# Patient Record
Sex: MALE | Race: BLACK OR AFRICAN AMERICAN | NOT HISPANIC OR LATINO | Employment: FULL TIME | URBAN - METROPOLITAN AREA
[De-identification: names, ages, dates, MRNs, and addresses within clinical notes are randomized per-mention and may not be internally consistent; named-entity substitution may affect disease eponyms.]

---

## 2017-02-02 ENCOUNTER — COMMUNICATION - HEALTHEAST (OUTPATIENT)
Dept: ADMINISTRATIVE | Facility: CLINIC | Age: 47
End: 2017-02-02

## 2017-02-07 ENCOUNTER — OFFICE VISIT - HEALTHEAST (OUTPATIENT)
Dept: FAMILY MEDICINE | Facility: CLINIC | Age: 47
End: 2017-02-07

## 2017-02-07 DIAGNOSIS — I71.20 ANEURYSM OF THORACIC AORTA (H): ICD-10-CM

## 2017-02-17 ENCOUNTER — HOSPITAL ENCOUNTER (OUTPATIENT)
Dept: CT IMAGING | Facility: CLINIC | Age: 47
Discharge: HOME | End: 2017-02-17
Attending: FAMILY MEDICINE

## 2017-02-17 ENCOUNTER — COMMUNICATION - HEALTHEAST (OUTPATIENT)
Dept: FAMILY MEDICINE | Facility: CLINIC | Age: 47
End: 2017-02-17

## 2017-02-17 DIAGNOSIS — I71.20 ANEURYSM OF THORACIC AORTA (H): ICD-10-CM

## 2017-02-24 ENCOUNTER — OFFICE VISIT - HEALTHEAST (OUTPATIENT)
Dept: FAMILY MEDICINE | Facility: CLINIC | Age: 47
End: 2017-02-24

## 2017-02-24 DIAGNOSIS — M10.9 GOUT, UNSPECIFIED: ICD-10-CM

## 2017-02-24 DIAGNOSIS — K76.0 FATTY LIVER: ICD-10-CM

## 2017-02-24 DIAGNOSIS — I71.20 ANEURYSM OF THORACIC AORTA (H): ICD-10-CM

## 2017-02-28 ENCOUNTER — OFFICE VISIT - HEALTHEAST (OUTPATIENT)
Dept: FAMILY MEDICINE | Facility: CLINIC | Age: 47
End: 2017-02-28

## 2017-02-28 ENCOUNTER — RECORDS - HEALTHEAST (OUTPATIENT)
Dept: GENERAL RADIOLOGY | Facility: CLINIC | Age: 47
End: 2017-02-28

## 2017-02-28 DIAGNOSIS — M10.9 GOUT, UNSPECIFIED: ICD-10-CM

## 2017-02-28 DIAGNOSIS — M79.671 FOOT PAIN, RIGHT: ICD-10-CM

## 2017-03-01 ENCOUNTER — COMMUNICATION - HEALTHEAST (OUTPATIENT)
Dept: FAMILY MEDICINE | Facility: CLINIC | Age: 47
End: 2017-03-01

## 2017-03-28 ENCOUNTER — OFFICE VISIT - HEALTHEAST (OUTPATIENT)
Dept: FAMILY MEDICINE | Facility: CLINIC | Age: 47
End: 2017-03-28

## 2017-03-28 DIAGNOSIS — M10.9 GOUT, UNSPECIFIED: ICD-10-CM

## 2017-04-05 ENCOUNTER — OFFICE VISIT - HEALTHEAST (OUTPATIENT)
Dept: FAMILY MEDICINE | Facility: CLINIC | Age: 47
End: 2017-04-05

## 2017-04-05 DIAGNOSIS — R11.0 NAUSEA: ICD-10-CM

## 2017-04-05 DIAGNOSIS — M54.50 LOW BACK PAIN: ICD-10-CM

## 2017-04-11 ENCOUNTER — OFFICE VISIT - HEALTHEAST (OUTPATIENT)
Dept: CARDIOLOGY | Facility: CLINIC | Age: 47
End: 2017-04-11

## 2017-04-11 DIAGNOSIS — I10 ESSENTIAL HYPERTENSION: ICD-10-CM

## 2017-04-11 DIAGNOSIS — I71.20 ANEURYSM OF THORACIC AORTA (H): ICD-10-CM

## 2017-04-11 ASSESSMENT — MIFFLIN-ST. JEOR: SCORE: 2019.73

## 2017-04-18 ENCOUNTER — COMMUNICATION - HEALTHEAST (OUTPATIENT)
Dept: FAMILY MEDICINE | Facility: CLINIC | Age: 47
End: 2017-04-18

## 2017-04-27 ENCOUNTER — COMMUNICATION - HEALTHEAST (OUTPATIENT)
Dept: FAMILY MEDICINE | Facility: CLINIC | Age: 47
End: 2017-04-27

## 2017-05-12 ENCOUNTER — OFFICE VISIT - HEALTHEAST (OUTPATIENT)
Dept: CARDIOLOGY | Facility: CLINIC | Age: 47
End: 2017-05-12

## 2017-05-12 DIAGNOSIS — I71.20 THORACIC AORTIC ANEURYSM WITHOUT RUPTURE (H): ICD-10-CM

## 2017-05-12 ASSESSMENT — MIFFLIN-ST. JEOR: SCORE: 2028.8

## 2017-07-22 ENCOUNTER — COMMUNICATION - HEALTHEAST (OUTPATIENT)
Dept: FAMILY MEDICINE | Facility: CLINIC | Age: 47
End: 2017-07-22

## 2017-07-22 DIAGNOSIS — I10 HYPERTENSION: ICD-10-CM

## 2017-08-15 ENCOUNTER — OFFICE VISIT - HEALTHEAST (OUTPATIENT)
Dept: FAMILY MEDICINE | Facility: CLINIC | Age: 47
End: 2017-08-15

## 2017-08-15 DIAGNOSIS — M10.9 GOUT, UNSPECIFIED: ICD-10-CM

## 2017-08-15 DIAGNOSIS — I51.7 LVH (LEFT VENTRICULAR HYPERTROPHY): ICD-10-CM

## 2017-08-15 DIAGNOSIS — I71.20 THORACIC AORTIC ANEURYSM WITHOUT RUPTURE (H): ICD-10-CM

## 2017-08-15 DIAGNOSIS — I71.20 ANEURYSM OF THORACIC AORTA (H): ICD-10-CM

## 2017-08-15 DIAGNOSIS — I10 HYPERTENSION: ICD-10-CM

## 2017-08-15 DIAGNOSIS — E66.9 OBESITY, UNSPECIFIED: ICD-10-CM

## 2017-08-30 ENCOUNTER — OFFICE VISIT - HEALTHEAST (OUTPATIENT)
Dept: VASCULAR SURGERY | Facility: CLINIC | Age: 47
End: 2017-08-30

## 2017-08-30 DIAGNOSIS — I71.21 THORACIC ASCENDING AORTIC ANEURYSM (H): ICD-10-CM

## 2017-08-30 ASSESSMENT — MIFFLIN-ST. JEOR: SCORE: 2033.34

## 2017-09-11 ENCOUNTER — HOSPITAL ENCOUNTER (OUTPATIENT)
Dept: CARDIOLOGY | Facility: CLINIC | Age: 47
Discharge: HOME | End: 2017-09-11
Attending: SURGERY

## 2017-09-11 DIAGNOSIS — I71.21 THORACIC ASCENDING AORTIC ANEURYSM (H): ICD-10-CM

## 2017-09-11 LAB
AORTIC ROOT: 4.3 CM
AORTIC VALVE MEAN VELOCITY: 91.3 CM/S
AV DIMENSIONLESS INDEX VTI: 0.7
AV MEAN GRADIENT: 4 MMHG
AV PEAK GRADIENT: 7.1 MMHG
AV VELOCITY RATIO: 0.8
BSA FOR ECHO PROCEDURE: 2.41 M2
CV BLOOD PRESSURE: NORMAL MMHG
CV ECHO HEIGHT: 69 IN
CV ECHO WEIGHT: 263 LBS
DOP CALC AO PEAK VEL: 133 CM/S
DOP CALC AO VTI: 28.4 CM
DOP CALC LVOT PEAK VEL: 102 CM/S
DOP CALCLVOT PEAK VEL VTI: 20.8 CM
EJECTION FRACTION: 53 % (ref 55–75)
FRACTIONAL SHORTENING: 37.7 % (ref 28–44)
INTERVENTRICULAR SEPTUM IN END DIASTOLE: 1.21 CM (ref 0.6–1)
IVS/PW RATIO: 1
LA AREA 1: 19.2 CM2
LA AREA 2: 18.7 CM2
LEFT ATRIUM LENGTH: 4.82 CM
LEFT ATRIUM VOLUME INDEX: 26.3 ML/M2
LEFT ATRIUM VOLUME: 63.3 CM3
LEFT VENTRICLE DIASTOLIC VOLUME INDEX: 34.4 CM3/M2 (ref 34–74)
LEFT VENTRICLE DIASTOLIC VOLUME: 83 CM3 (ref 62–150)
LEFT VENTRICLE HEART RATE: 64 BPM
LEFT VENTRICLE MASS INDEX: 96.5 G/M2
LEFT VENTRICLE SYSTOLIC VOLUME INDEX: 16.2 CM3/M2 (ref 11–31)
LEFT VENTRICLE SYSTOLIC VOLUME: 39 CM3 (ref 21–61)
LEFT VENTRICULAR INTERNAL DIMENSION IN DIASTOLE: 5.04 CM (ref 4.2–5.8)
LEFT VENTRICULAR INTERNAL DIMENSION IN SYSTOLE: 3.14 CM (ref 2.5–4)
LEFT VENTRICULAR MASS: 232.6 G
LEFT VENTRICULAR OUTFLOW TRACT MEAN GRADIENT: 2 MMHG
LEFT VENTRICULAR OUTFLOW TRACT MEAN VELOCITY: 65.9 CM/S
LEFT VENTRICULAR OUTFLOW TRACT PEAK GRADIENT: 4 MMHG
LEFT VENTRICULAR POSTERIOR WALL IN END DIASTOLE: 1.16 CM (ref 0.6–1)
MITRAL VALVE E/A RATIO: 0.8
MV AVERAGE E/E' RATIO: 12.1 CM/S
MV DECELERATION TIME: 183 MS
MV E'TISSUE VEL-LAT: 5.46 CM/S
MV E'TISSUE VEL-MED: 3.61 CM/S
MV LATERAL E/E' RATIO: 10.1
MV MEDIAL E/E' RATIO: 15.2
MV PEAK A VELOCITY: 67.3 CM/S
MV PEAK E VELOCITY: 54.9 CM/S
NUC REST DIASTOLIC VOLUME INDEX: 4208 LBS
NUC REST SYSTOLIC VOLUME INDEX: 69 IN
PR MAX PG: 5 MMHG
PR PEAK VELOCITY: 110 CM/S
TRICUSPID REGURGITATION PEAK PRESSURE GRADIENT: 20.3 MMHG
TRICUSPID VALVE PEAK REGURGITANT VELOCITY: 225 CM/S

## 2017-09-11 ASSESSMENT — MIFFLIN-ST. JEOR: SCORE: 2033.34

## 2017-10-25 ENCOUNTER — COMMUNICATION - HEALTHEAST (OUTPATIENT)
Dept: FAMILY MEDICINE | Facility: CLINIC | Age: 47
End: 2017-10-25

## 2017-10-25 DIAGNOSIS — M10.9 GOUT: ICD-10-CM

## 2017-11-03 ENCOUNTER — OFFICE VISIT - HEALTHEAST (OUTPATIENT)
Dept: FAMILY MEDICINE | Facility: CLINIC | Age: 47
End: 2017-11-03

## 2017-11-03 ENCOUNTER — COMMUNICATION - HEALTHEAST (OUTPATIENT)
Dept: FAMILY MEDICINE | Facility: CLINIC | Age: 47
End: 2017-11-03

## 2017-11-03 DIAGNOSIS — I10 HYPERTENSION: ICD-10-CM

## 2017-11-03 DIAGNOSIS — I51.7 LVH (LEFT VENTRICULAR HYPERTROPHY): ICD-10-CM

## 2017-11-03 DIAGNOSIS — I71.20 THORACIC AORTIC ANEURYSM WITHOUT RUPTURE (H): ICD-10-CM

## 2017-11-03 DIAGNOSIS — R68.89 GENERAL ILL FEELING: ICD-10-CM

## 2017-11-03 LAB
CHOLEST SERPL-MCNC: 191 MG/DL
FASTING STATUS PATIENT QL REPORTED: YES
HDLC SERPL-MCNC: 35 MG/DL
LDLC SERPL CALC-MCNC: 112 MG/DL
TRIGL SERPL-MCNC: 221 MG/DL

## 2017-11-27 ENCOUNTER — COMMUNICATION - HEALTHEAST (OUTPATIENT)
Dept: ADMINISTRATIVE | Facility: CLINIC | Age: 47
End: 2017-11-27

## 2017-12-08 ENCOUNTER — OFFICE VISIT - HEALTHEAST (OUTPATIENT)
Dept: FAMILY MEDICINE | Facility: CLINIC | Age: 47
End: 2017-12-08

## 2017-12-08 DIAGNOSIS — I71.20 THORACIC AORTIC ANEURYSM WITHOUT RUPTURE (H): ICD-10-CM

## 2018-01-02 ENCOUNTER — OFFICE VISIT - HEALTHEAST (OUTPATIENT)
Dept: FAMILY MEDICINE | Facility: CLINIC | Age: 48
End: 2018-01-02

## 2018-01-02 DIAGNOSIS — I71.20 THORACIC AORTIC ANEURYSM WITHOUT RUPTURE (H): ICD-10-CM

## 2018-03-13 ENCOUNTER — OFFICE VISIT - HEALTHEAST (OUTPATIENT)
Dept: FAMILY MEDICINE | Facility: CLINIC | Age: 48
End: 2018-03-13

## 2018-03-13 DIAGNOSIS — B34.9 VIRAL ILLNESS: ICD-10-CM

## 2018-03-13 DIAGNOSIS — I10 HYPERTENSION: ICD-10-CM

## 2018-03-21 ENCOUNTER — COMMUNICATION - HEALTHEAST (OUTPATIENT)
Dept: FAMILY MEDICINE | Facility: CLINIC | Age: 48
End: 2018-03-21

## 2018-03-26 ENCOUNTER — OFFICE VISIT - HEALTHEAST (OUTPATIENT)
Dept: FAMILY MEDICINE | Facility: CLINIC | Age: 48
End: 2018-03-26

## 2018-03-26 DIAGNOSIS — I71.20 THORACIC AORTIC ANEURYSM WITHOUT RUPTURE (H): ICD-10-CM

## 2018-03-26 DIAGNOSIS — I10 HYPERTENSION: ICD-10-CM

## 2018-05-11 ENCOUNTER — OFFICE VISIT - HEALTHEAST (OUTPATIENT)
Dept: FAMILY MEDICINE | Facility: CLINIC | Age: 48
End: 2018-05-11

## 2018-05-11 ENCOUNTER — COMMUNICATION - HEALTHEAST (OUTPATIENT)
Dept: FAMILY MEDICINE | Facility: CLINIC | Age: 48
End: 2018-05-11

## 2018-05-11 ENCOUNTER — HOSPITAL ENCOUNTER (OUTPATIENT)
Dept: CT IMAGING | Facility: CLINIC | Age: 48
Discharge: HOME | End: 2018-05-11
Attending: FAMILY MEDICINE

## 2018-05-11 ENCOUNTER — RECORDS - HEALTHEAST (OUTPATIENT)
Dept: GENERAL RADIOLOGY | Facility: CLINIC | Age: 48
End: 2018-05-11

## 2018-05-11 DIAGNOSIS — R05.9 COUGH: ICD-10-CM

## 2018-05-11 DIAGNOSIS — I71.20 THORACIC AORTIC ANEURYSM WITHOUT RUPTURE (H): ICD-10-CM

## 2018-05-11 DIAGNOSIS — I10 HYPERTENSION: ICD-10-CM

## 2018-05-14 ENCOUNTER — COMMUNICATION - HEALTHEAST (OUTPATIENT)
Dept: FAMILY MEDICINE | Facility: CLINIC | Age: 48
End: 2018-05-14

## 2018-05-15 ENCOUNTER — AMBULATORY - HEALTHEAST (OUTPATIENT)
Dept: FAMILY MEDICINE | Facility: CLINIC | Age: 48
End: 2018-05-15

## 2018-05-16 ENCOUNTER — COMMUNICATION - HEALTHEAST (OUTPATIENT)
Dept: FAMILY MEDICINE | Facility: CLINIC | Age: 48
End: 2018-05-16

## 2018-05-21 ENCOUNTER — OFFICE VISIT - HEALTHEAST (OUTPATIENT)
Dept: FAMILY MEDICINE | Facility: CLINIC | Age: 48
End: 2018-05-21

## 2018-05-21 DIAGNOSIS — R05.9 COUGH: ICD-10-CM

## 2018-05-30 ENCOUNTER — OFFICE VISIT - HEALTHEAST (OUTPATIENT)
Dept: FAMILY MEDICINE | Facility: CLINIC | Age: 48
End: 2018-05-30

## 2018-05-30 DIAGNOSIS — I10 HYPERTENSION: ICD-10-CM

## 2018-06-13 ENCOUNTER — OFFICE VISIT - HEALTHEAST (OUTPATIENT)
Dept: FAMILY MEDICINE | Facility: CLINIC | Age: 48
End: 2018-06-13

## 2018-06-13 DIAGNOSIS — I71.20 THORACIC AORTIC ANEURYSM WITHOUT RUPTURE (H): ICD-10-CM

## 2018-06-13 DIAGNOSIS — I10 HYPERTENSION: ICD-10-CM

## 2018-08-20 ENCOUNTER — OFFICE VISIT - HEALTHEAST (OUTPATIENT)
Dept: FAMILY MEDICINE | Facility: CLINIC | Age: 48
End: 2018-08-20

## 2018-08-20 DIAGNOSIS — E66.01 MORBID OBESITY (H): ICD-10-CM

## 2018-08-20 DIAGNOSIS — I10 HYPERTENSION: ICD-10-CM

## 2018-08-20 DIAGNOSIS — M10.9 GOUT: ICD-10-CM

## 2018-09-02 ENCOUNTER — COMMUNICATION - HEALTHEAST (OUTPATIENT)
Dept: FAMILY MEDICINE | Facility: CLINIC | Age: 48
End: 2018-09-02

## 2018-09-05 ENCOUNTER — OFFICE VISIT - HEALTHEAST (OUTPATIENT)
Dept: FAMILY MEDICINE | Facility: CLINIC | Age: 48
End: 2018-09-05

## 2018-09-05 DIAGNOSIS — M10.9 GOUT: ICD-10-CM

## 2018-09-05 DIAGNOSIS — I10 HYPERTENSION: ICD-10-CM

## 2018-09-05 DIAGNOSIS — I51.7 LVH (LEFT VENTRICULAR HYPERTROPHY): ICD-10-CM

## 2018-09-12 ENCOUNTER — OFFICE VISIT - HEALTHEAST (OUTPATIENT)
Dept: FAMILY MEDICINE | Facility: CLINIC | Age: 48
End: 2018-09-12

## 2018-09-12 DIAGNOSIS — I10 HYPERTENSION: ICD-10-CM

## 2018-09-12 LAB
ANION GAP SERPL CALCULATED.3IONS-SCNC: 13 MMOL/L (ref 5–18)
BUN SERPL-MCNC: 13 MG/DL (ref 8–22)
CALCIUM SERPL-MCNC: 10.1 MG/DL (ref 8.5–10.5)
CHLORIDE BLD-SCNC: 103 MMOL/L (ref 98–107)
CO2 SERPL-SCNC: 25 MMOL/L (ref 22–31)
CREAT SERPL-MCNC: 1.16 MG/DL (ref 0.7–1.3)
GFR SERPL CREATININE-BSD FRML MDRD: >60 ML/MIN/1.73M2
GLUCOSE BLD-MCNC: 130 MG/DL (ref 70–125)
POTASSIUM BLD-SCNC: 3.8 MMOL/L (ref 3.5–5)
SODIUM SERPL-SCNC: 141 MMOL/L (ref 136–145)

## 2018-09-14 ENCOUNTER — COMMUNICATION - HEALTHEAST (OUTPATIENT)
Dept: FAMILY MEDICINE | Facility: CLINIC | Age: 48
End: 2018-09-14

## 2018-09-19 ENCOUNTER — OFFICE VISIT - HEALTHEAST (OUTPATIENT)
Dept: FAMILY MEDICINE | Facility: CLINIC | Age: 48
End: 2018-09-19

## 2018-09-19 DIAGNOSIS — I10 HYPERTENSION: ICD-10-CM

## 2018-09-21 ENCOUNTER — COMMUNICATION - HEALTHEAST (OUTPATIENT)
Dept: FAMILY MEDICINE | Facility: CLINIC | Age: 48
End: 2018-09-21

## 2018-09-24 ENCOUNTER — COMMUNICATION - HEALTHEAST (OUTPATIENT)
Dept: FAMILY MEDICINE | Facility: CLINIC | Age: 48
End: 2018-09-24

## 2018-09-24 ENCOUNTER — AMBULATORY - HEALTHEAST (OUTPATIENT)
Dept: FAMILY MEDICINE | Facility: CLINIC | Age: 48
End: 2018-09-24

## 2018-09-26 ENCOUNTER — COMMUNICATION - HEALTHEAST (OUTPATIENT)
Dept: FAMILY MEDICINE | Facility: CLINIC | Age: 48
End: 2018-09-26

## 2018-10-05 ENCOUNTER — OFFICE VISIT - HEALTHEAST (OUTPATIENT)
Dept: FAMILY MEDICINE | Facility: CLINIC | Age: 48
End: 2018-10-05

## 2018-10-05 DIAGNOSIS — I10 HYPERTENSION: ICD-10-CM

## 2018-10-22 ENCOUNTER — COMMUNICATION - HEALTHEAST (OUTPATIENT)
Dept: ADMINISTRATIVE | Facility: CLINIC | Age: 48
End: 2018-10-22

## 2018-10-23 ENCOUNTER — OFFICE VISIT - HEALTHEAST (OUTPATIENT)
Dept: FAMILY MEDICINE | Facility: CLINIC | Age: 48
End: 2018-10-23

## 2018-10-23 DIAGNOSIS — I10 HYPERTENSION: ICD-10-CM

## 2018-10-23 DIAGNOSIS — M10.9 GOUT: ICD-10-CM

## 2018-12-04 ENCOUNTER — COMMUNICATION - HEALTHEAST (OUTPATIENT)
Dept: FAMILY MEDICINE | Facility: CLINIC | Age: 48
End: 2018-12-04

## 2018-12-04 DIAGNOSIS — I10 HYPERTENSION: ICD-10-CM

## 2018-12-22 ENCOUNTER — COMMUNICATION - HEALTHEAST (OUTPATIENT)
Dept: FAMILY MEDICINE | Facility: CLINIC | Age: 48
End: 2018-12-22

## 2018-12-22 DIAGNOSIS — I10 HYPERTENSION: ICD-10-CM

## 2018-12-22 DIAGNOSIS — I51.7 LVH (LEFT VENTRICULAR HYPERTROPHY): ICD-10-CM

## 2019-01-28 ENCOUNTER — OFFICE VISIT - HEALTHEAST (OUTPATIENT)
Dept: FAMILY MEDICINE | Facility: CLINIC | Age: 49
End: 2019-01-28

## 2019-01-28 DIAGNOSIS — B34.9 VIRAL ILLNESS: ICD-10-CM

## 2019-01-28 DIAGNOSIS — I10 HYPERTENSION, UNSPECIFIED TYPE: ICD-10-CM

## 2019-02-11 ENCOUNTER — OFFICE VISIT - HEALTHEAST (OUTPATIENT)
Dept: FAMILY MEDICINE | Facility: CLINIC | Age: 49
End: 2019-02-11

## 2019-02-11 DIAGNOSIS — I10 HYPERTENSION, UNSPECIFIED TYPE: ICD-10-CM

## 2019-02-27 ENCOUNTER — OFFICE VISIT - HEALTHEAST (OUTPATIENT)
Dept: FAMILY MEDICINE | Facility: CLINIC | Age: 49
End: 2019-02-27

## 2019-02-27 DIAGNOSIS — R07.9 CHEST PAIN, UNSPECIFIED TYPE: ICD-10-CM

## 2019-02-27 DIAGNOSIS — I10 HYPERTENSION, UNSPECIFIED TYPE: ICD-10-CM

## 2019-02-27 DIAGNOSIS — R51.9 NONINTRACTABLE HEADACHE, UNSPECIFIED CHRONICITY PATTERN, UNSPECIFIED HEADACHE TYPE: ICD-10-CM

## 2019-03-04 ENCOUNTER — AMBULATORY - HEALTHEAST (OUTPATIENT)
Dept: FAMILY MEDICINE | Facility: CLINIC | Age: 49
End: 2019-03-04

## 2019-03-04 ENCOUNTER — COMMUNICATION - HEALTHEAST (OUTPATIENT)
Dept: FAMILY MEDICINE | Facility: CLINIC | Age: 49
End: 2019-03-04

## 2019-03-19 ENCOUNTER — OFFICE VISIT - HEALTHEAST (OUTPATIENT)
Dept: FAMILY MEDICINE | Facility: CLINIC | Age: 49
End: 2019-03-19

## 2019-03-19 DIAGNOSIS — R51.9 NONINTRACTABLE HEADACHE, UNSPECIFIED CHRONICITY PATTERN, UNSPECIFIED HEADACHE TYPE: ICD-10-CM

## 2019-03-19 DIAGNOSIS — H53.143 PHOTOPHOBIA OF BOTH EYES: ICD-10-CM

## 2019-03-19 DIAGNOSIS — I10 HYPERTENSION, UNSPECIFIED TYPE: ICD-10-CM

## 2019-03-19 DIAGNOSIS — I71.20 THORACIC AORTIC ANEURYSM WITHOUT RUPTURE (H): ICD-10-CM

## 2019-03-19 LAB
ANION GAP SERPL CALCULATED.3IONS-SCNC: 9 MMOL/L (ref 5–18)
BUN SERPL-MCNC: 12 MG/DL (ref 8–22)
C REACTIVE PROTEIN LHE: 2.1 MG/DL (ref 0–0.8)
CALCIUM SERPL-MCNC: 9.9 MG/DL (ref 8.5–10.5)
CHLORIDE BLD-SCNC: 103 MMOL/L (ref 98–107)
CO2 SERPL-SCNC: 28 MMOL/L (ref 22–31)
CREAT SERPL-MCNC: 1.24 MG/DL (ref 0.7–1.3)
ERYTHROCYTE [DISTWIDTH] IN BLOOD BY AUTOMATED COUNT: 14.1 % (ref 11–14.5)
ERYTHROCYTE [SEDIMENTATION RATE] IN BLOOD BY WESTERGREN METHOD: 13 MM/HR (ref 0–15)
GFR SERPL CREATININE-BSD FRML MDRD: >60 ML/MIN/1.73M2
GLUCOSE BLD-MCNC: 134 MG/DL (ref 70–125)
HCT VFR BLD AUTO: 45 % (ref 40–54)
HGB BLD-MCNC: 15 G/DL (ref 14–18)
MCH RBC QN AUTO: 29.1 PG (ref 27–34)
MCHC RBC AUTO-ENTMCNC: 33.4 G/DL (ref 32–36)
MCV RBC AUTO: 87 FL (ref 80–100)
PLATELET # BLD AUTO: 256 THOU/UL (ref 140–440)
PMV BLD AUTO: 6.5 FL (ref 7–10)
POTASSIUM BLD-SCNC: 4 MMOL/L (ref 3.5–5)
RBC # BLD AUTO: 5.16 MILL/UL (ref 4.4–6.2)
SODIUM SERPL-SCNC: 140 MMOL/L (ref 136–145)
WBC: 5.7 THOU/UL (ref 4–11)

## 2019-03-20 ENCOUNTER — AMBULATORY - HEALTHEAST (OUTPATIENT)
Dept: FAMILY MEDICINE | Facility: CLINIC | Age: 49
End: 2019-03-20

## 2019-03-20 ENCOUNTER — COMMUNICATION - HEALTHEAST (OUTPATIENT)
Dept: FAMILY MEDICINE | Facility: CLINIC | Age: 49
End: 2019-03-20

## 2019-03-20 ENCOUNTER — HOSPITAL ENCOUNTER (OUTPATIENT)
Dept: CT IMAGING | Facility: CLINIC | Age: 49
Discharge: HOME | End: 2019-03-20
Attending: FAMILY MEDICINE

## 2019-03-20 DIAGNOSIS — I71.20 THORACIC AORTIC ANEURYSM WITHOUT RUPTURE (H): ICD-10-CM

## 2019-03-20 DIAGNOSIS — R51.9 NONINTRACTABLE HEADACHE, UNSPECIFIED CHRONICITY PATTERN, UNSPECIFIED HEADACHE TYPE: ICD-10-CM

## 2019-03-27 ENCOUNTER — COMMUNICATION - HEALTHEAST (OUTPATIENT)
Dept: FAMILY MEDICINE | Facility: CLINIC | Age: 49
End: 2019-03-27

## 2019-03-29 ENCOUNTER — COMMUNICATION - HEALTHEAST (OUTPATIENT)
Dept: FAMILY MEDICINE | Facility: CLINIC | Age: 49
End: 2019-03-29

## 2019-03-29 ENCOUNTER — OFFICE VISIT - HEALTHEAST (OUTPATIENT)
Dept: FAMILY MEDICINE | Facility: CLINIC | Age: 49
End: 2019-03-29

## 2019-03-29 DIAGNOSIS — Z01.818 PREOP EXAMINATION: ICD-10-CM

## 2019-03-29 DIAGNOSIS — I10 HYPERTENSION, UNSPECIFIED TYPE: ICD-10-CM

## 2019-03-29 DIAGNOSIS — I71.20 THORACIC AORTIC ANEURYSM WITHOUT RUPTURE (H): ICD-10-CM

## 2019-03-29 DIAGNOSIS — R51.9 NONINTRACTABLE HEADACHE, UNSPECIFIED CHRONICITY PATTERN, UNSPECIFIED HEADACHE TYPE: ICD-10-CM

## 2019-03-29 LAB
ANION GAP SERPL CALCULATED.3IONS-SCNC: 11 MMOL/L (ref 5–18)
ATRIAL RATE - MUSE: 73 BPM
BUN SERPL-MCNC: 9 MG/DL (ref 8–22)
CALCIUM SERPL-MCNC: 9.8 MG/DL (ref 8.5–10.5)
CHLORIDE BLD-SCNC: 104 MMOL/L (ref 98–107)
CO2 SERPL-SCNC: 25 MMOL/L (ref 22–31)
CREAT SERPL-MCNC: 1.11 MG/DL (ref 0.7–1.3)
DIASTOLIC BLOOD PRESSURE - MUSE: NORMAL MMHG
ERYTHROCYTE [DISTWIDTH] IN BLOOD BY AUTOMATED COUNT: 13.3 % (ref 11–14.5)
GFR SERPL CREATININE-BSD FRML MDRD: >60 ML/MIN/1.73M2
GLUCOSE BLD-MCNC: 148 MG/DL (ref 70–125)
HCT VFR BLD AUTO: 47.9 % (ref 40–54)
HGB BLD-MCNC: 16.1 G/DL (ref 14–18)
INTERPRETATION ECG - MUSE: NORMAL
MCH RBC QN AUTO: 30.1 PG (ref 27–34)
MCHC RBC AUTO-ENTMCNC: 33.6 G/DL (ref 32–36)
MCV RBC AUTO: 90 FL (ref 80–100)
P AXIS - MUSE: 46 DEGREES
PLATELET # BLD AUTO: 271 THOU/UL (ref 140–440)
PMV BLD AUTO: 6.7 FL (ref 7–10)
POTASSIUM BLD-SCNC: 4 MMOL/L (ref 3.5–5)
PR INTERVAL - MUSE: 166 MS
QRS DURATION - MUSE: 90 MS
QT - MUSE: 390 MS
QTC - MUSE: 429 MS
R AXIS - MUSE: -23 DEGREES
RBC # BLD AUTO: 5.35 MILL/UL (ref 4.4–6.2)
SODIUM SERPL-SCNC: 140 MMOL/L (ref 136–145)
SYSTOLIC BLOOD PRESSURE - MUSE: NORMAL MMHG
T AXIS - MUSE: 32 DEGREES
VENTRICULAR RATE- MUSE: 73 BPM
WBC: 4.9 THOU/UL (ref 4–11)

## 2019-03-31 ENCOUNTER — COMMUNICATION - HEALTHEAST (OUTPATIENT)
Dept: FAMILY MEDICINE | Facility: CLINIC | Age: 49
End: 2019-03-31

## 2019-04-03 ENCOUNTER — OFFICE VISIT - HEALTHEAST (OUTPATIENT)
Dept: CARDIOLOGY | Facility: CLINIC | Age: 49
End: 2019-04-03

## 2019-04-03 DIAGNOSIS — I71.20 THORACIC AORTIC ANEURYSM WITHOUT RUPTURE (H): ICD-10-CM

## 2019-04-03 ASSESSMENT — MIFFLIN-ST. JEOR: SCORE: 2060.56

## 2019-04-19 ENCOUNTER — RECORDS - HEALTHEAST (OUTPATIENT)
Dept: ADMINISTRATIVE | Facility: OTHER | Age: 49
End: 2019-04-19

## 2019-04-24 ENCOUNTER — COMMUNICATION - HEALTHEAST (OUTPATIENT)
Dept: FAMILY MEDICINE | Facility: CLINIC | Age: 49
End: 2019-04-24

## 2019-04-30 ENCOUNTER — AMBULATORY - HEALTHEAST (OUTPATIENT)
Dept: CARDIOLOGY | Facility: CLINIC | Age: 49
End: 2019-04-30

## 2019-04-30 DIAGNOSIS — I71.21 THORACIC ASCENDING AORTIC ANEURYSM (H): ICD-10-CM

## 2019-05-01 ENCOUNTER — COMMUNICATION - HEALTHEAST (OUTPATIENT)
Dept: CARDIOLOGY | Facility: CLINIC | Age: 49
End: 2019-05-01

## 2019-05-01 ENCOUNTER — SURGERY - HEALTHEAST (OUTPATIENT)
Dept: CARDIOLOGY | Facility: CLINIC | Age: 49
End: 2019-05-01

## 2019-05-02 ENCOUNTER — COMMUNICATION - HEALTHEAST (OUTPATIENT)
Dept: FAMILY MEDICINE | Facility: CLINIC | Age: 49
End: 2019-05-02

## 2019-05-02 ENCOUNTER — SURGERY - HEALTHEAST (OUTPATIENT)
Dept: CARDIOLOGY | Facility: CLINIC | Age: 49
End: 2019-05-02

## 2019-05-02 ASSESSMENT — MIFFLIN-ST. JEOR: SCORE: 2050.12

## 2019-05-07 ENCOUNTER — COMMUNICATION - HEALTHEAST (OUTPATIENT)
Dept: CARDIOLOGY | Facility: CLINIC | Age: 49
End: 2019-05-07

## 2019-05-17 ENCOUNTER — OFFICE VISIT - HEALTHEAST (OUTPATIENT)
Dept: CARDIOLOGY | Facility: CLINIC | Age: 49
End: 2019-05-17

## 2019-05-17 DIAGNOSIS — I71.21 THORACIC ASCENDING AORTIC ANEURYSM (H): ICD-10-CM

## 2019-05-17 ASSESSMENT — MIFFLIN-ST. JEOR: SCORE: 2052.39

## 2019-05-21 ENCOUNTER — AMBULATORY - HEALTHEAST (OUTPATIENT)
Dept: CARDIOLOGY | Facility: CLINIC | Age: 49
End: 2019-05-21

## 2019-05-22 ENCOUNTER — COMMUNICATION - HEALTHEAST (OUTPATIENT)
Dept: ADMINISTRATIVE | Facility: CLINIC | Age: 49
End: 2019-05-22

## 2019-06-04 ENCOUNTER — OFFICE VISIT - HEALTHEAST (OUTPATIENT)
Dept: FAMILY MEDICINE | Facility: CLINIC | Age: 49
End: 2019-06-04

## 2019-06-04 DIAGNOSIS — Z12.11 SCREEN FOR COLON CANCER: ICD-10-CM

## 2019-06-04 DIAGNOSIS — I71.20 THORACIC AORTIC ANEURYSM WITHOUT RUPTURE (H): ICD-10-CM

## 2019-06-04 DIAGNOSIS — I10 HYPERTENSION, UNSPECIFIED TYPE: ICD-10-CM

## 2019-06-04 DIAGNOSIS — G43.909 MIGRAINE WITHOUT STATUS MIGRAINOSUS, NOT INTRACTABLE, UNSPECIFIED MIGRAINE TYPE: ICD-10-CM

## 2019-06-05 ENCOUNTER — HOSPITAL ENCOUNTER (OUTPATIENT)
Dept: CARDIOLOGY | Facility: CLINIC | Age: 49
Discharge: HOME | End: 2019-06-05

## 2019-06-05 DIAGNOSIS — I71.21 THORACIC ASCENDING AORTIC ANEURYSM (H): ICD-10-CM

## 2019-06-05 ASSESSMENT — MIFFLIN-ST. JEOR: SCORE: 2042.41

## 2019-06-06 ENCOUNTER — SURGERY - HEALTHEAST (OUTPATIENT)
Dept: CARDIOLOGY | Facility: CLINIC | Age: 49
End: 2019-06-06

## 2019-06-06 ENCOUNTER — HOSPITAL ENCOUNTER (OUTPATIENT)
Dept: RADIOLOGY | Facility: CLINIC | Age: 49
Discharge: HOME | End: 2019-06-06

## 2019-06-06 DIAGNOSIS — I71.21 THORACIC ASCENDING AORTIC ANEURYSM (H): ICD-10-CM

## 2019-06-06 LAB
AORTIC ROOT: 4 CM
AORTIC VALVE MEAN VELOCITY: 89.2 CM/S
ASCENDING AORTA: 5.4 CM
AV DIMENSIONLESS INDEX VTI: 0.8
AV MEAN GRADIENT: 4 MMHG
AV PEAK GRADIENT: 7.7 MMHG
AV VALVE AREA: 2.8 CM2
AV VELOCITY RATIO: 0.7
BSA FOR ECHO PROCEDURE: 2.42 M2
CV BLOOD PRESSURE: ABNORMAL MMHG
CV ECHO HEIGHT: 69 IN
CV ECHO WEIGHT: 265 LBS
DOP CALC AO PEAK VEL: 139 CM/S
DOP CALC AO VTI: 23.5 CM
DOP CALC LVOT AREA: 3.46 CM2
DOP CALC LVOT DIAMETER: 2.1 CM
DOP CALC LVOT PEAK VEL: 104 CM/S
DOP CALC LVOT STROKE VOLUME: 65.1 CM3
DOP CALCLVOT PEAK VEL VTI: 18.8 CM
EJECTION FRACTION: 55 % (ref 55–75)
FRACTIONAL SHORTENING: 30.5 % (ref 28–44)
INTERVENTRICULAR SEPTUM IN END DIASTOLE: 1.17 CM (ref 0.6–1)
IVS/PW RATIO: 1.2
LA AREA 1: 16 CM2
LA AREA 2: 17.7 CM2
LEFT ATRIUM LENGTH: 4.83 CM
LEFT ATRIUM SIZE: 3 CM
LEFT ATRIUM TO AORTIC ROOT RATIO: 0.75 NO UNITS
LEFT ATRIUM VOLUME INDEX: 20.6 ML/M2
LEFT ATRIUM VOLUME: 49.8 ML
LEFT VENTRICLE DIASTOLIC VOLUME INDEX: 34.7 CM3/M2 (ref 34–74)
LEFT VENTRICLE DIASTOLIC VOLUME: 84 CM3 (ref 62–150)
LEFT VENTRICLE MASS INDEX: 63.1 G/M2
LEFT VENTRICLE SYSTOLIC VOLUME INDEX: 15.7 CM3/M2 (ref 11–31)
LEFT VENTRICLE SYSTOLIC VOLUME: 38 CM3 (ref 21–61)
LEFT VENTRICULAR INTERNAL DIMENSION IN DIASTOLE: 4.2 CM (ref 4.2–5.8)
LEFT VENTRICULAR INTERNAL DIMENSION IN SYSTOLE: 2.92 CM (ref 2.5–4)
LEFT VENTRICULAR MASS: 152.6 G
LEFT VENTRICULAR OUTFLOW TRACT MEAN GRADIENT: 3 MMHG
LEFT VENTRICULAR OUTFLOW TRACT MEAN VELOCITY: 77.5 CM/S
LEFT VENTRICULAR OUTFLOW TRACT PEAK GRADIENT: 4 MMHG
LEFT VENTRICULAR POSTERIOR WALL IN END DIASTOLE: 0.99 CM (ref 0.6–1)
LV STROKE VOLUME INDEX: 26.9 ML/M2
MITRAL VALVE E/A RATIO: 0.6
MV AVERAGE E/E' RATIO: 10.7 CM/S
MV DECELERATION TIME: 257 MS
MV E'TISSUE VEL-LAT: 5.56 CM/S
MV E'TISSUE VEL-MED: 2.92 CM/S
MV LATERAL E/E' RATIO: 8.2
MV MEDIAL E/E' RATIO: 15.5
MV PEAK A VELOCITY: 74.5 CM/S
MV PEAK E VELOCITY: 45.4 CM/S
NUC REST DIASTOLIC VOLUME INDEX: 4240 LBS
NUC REST SYSTOLIC VOLUME INDEX: 69 IN
TRICUSPID VALVE ANULAR PLANE SYSTOLIC EXCURSION: 1.9 CM

## 2019-06-07 ENCOUNTER — AMBULATORY - HEALTHEAST (OUTPATIENT)
Dept: CARDIOLOGY | Facility: CLINIC | Age: 49
End: 2019-06-07

## 2019-06-07 ENCOUNTER — HOSPITAL ENCOUNTER (OUTPATIENT)
Dept: SURGERY | Facility: CLINIC | Age: 49
Discharge: HOME | End: 2019-06-07

## 2019-06-07 ENCOUNTER — ANESTHESIA - HEALTHEAST (OUTPATIENT)
Dept: SURGERY | Facility: CLINIC | Age: 49
End: 2019-06-07

## 2019-06-07 DIAGNOSIS — I71.21 THORACIC ASCENDING AORTIC ANEURYSM (H): ICD-10-CM

## 2019-06-07 DIAGNOSIS — I71.20 THORACIC AORTIC ANEURYSM WITHOUT RUPTURE (H): ICD-10-CM

## 2019-06-07 LAB
ABO/RH(D): NORMAL
ALBUMIN UR-MCNC: NEGATIVE MG/DL
ANION GAP SERPL CALCULATED.3IONS-SCNC: 10 MMOL/L (ref 5–18)
ANTIBODY SCREEN: NEGATIVE
APPEARANCE UR: CLEAR
ATRIAL RATE - MUSE: 87 BPM
BILIRUB UR QL STRIP: NEGATIVE
BUN SERPL-MCNC: 8 MG/DL (ref 8–22)
CALCIUM SERPL-MCNC: 9.7 MG/DL (ref 8.5–10.5)
CHLORIDE BLD-SCNC: 103 MMOL/L (ref 98–107)
CO2 SERPL-SCNC: 25 MMOL/L (ref 22–31)
COLOR UR AUTO: YELLOW
CREAT SERPL-MCNC: 1.1 MG/DL (ref 0.7–1.3)
DIASTOLIC BLOOD PRESSURE - MUSE: NORMAL MMHG
ERYTHROCYTE [DISTWIDTH] IN BLOOD BY AUTOMATED COUNT: 13.4 % (ref 11–14.5)
GFR SERPL CREATININE-BSD FRML MDRD: >60 ML/MIN/1.73M2
GLUCOSE BLD-MCNC: 276 MG/DL (ref 70–125)
GLUCOSE UR STRIP-MCNC: NEGATIVE MG/DL
HBA1C MFR BLD: 8.9 % (ref 4.2–6.1)
HCT VFR BLD AUTO: 45.1 % (ref 40–54)
HGB BLD-MCNC: 15 G/DL (ref 14–18)
HGB UR QL STRIP: NEGATIVE
INR PPP: 0.96 (ref 0.9–1.1)
INTERPRETATION ECG - MUSE: NORMAL
KETONES UR STRIP-MCNC: NEGATIVE MG/DL
LEUKOCYTE ESTERASE UR QL STRIP: NEGATIVE
MAGNESIUM SERPL-MCNC: 2.1 MG/DL (ref 1.8–2.6)
MCH RBC QN AUTO: 29.3 PG (ref 27–34)
MCHC RBC AUTO-ENTMCNC: 33.3 G/DL (ref 32–36)
MCV RBC AUTO: 88 FL (ref 80–100)
NITRATE UR QL: NEGATIVE
P AXIS - MUSE: 46 DEGREES
PH UR STRIP: 5.5 [PH] (ref 4.5–8)
PLATELET # BLD AUTO: 247 THOU/UL (ref 140–440)
PMV BLD AUTO: 8.8 FL (ref 8.5–12.5)
POTASSIUM BLD-SCNC: 4.4 MMOL/L (ref 3.5–5)
PR INTERVAL - MUSE: 162 MS
PREALB SERPL-MCNC: 21.8 MG/DL (ref 19–38)
QRS DURATION - MUSE: 86 MS
QT - MUSE: 370 MS
QTC - MUSE: 445 MS
R AXIS - MUSE: -7 DEGREES
RBC # BLD AUTO: 5.12 MILL/UL (ref 4.4–6.2)
SODIUM SERPL-SCNC: 138 MMOL/L (ref 136–145)
SP GR UR STRIP: 1.02 (ref 1–1.03)
SYSTOLIC BLOOD PRESSURE - MUSE: NORMAL MMHG
T AXIS - MUSE: 28 DEGREES
UROBILINOGEN UR STRIP-ACNC: NORMAL
VENTRICULAR RATE- MUSE: 87 BPM
WBC: 5.7 THOU/UL (ref 4–11)

## 2019-06-08 LAB — BACTERIA SPEC CULT: NORMAL

## 2019-06-10 ENCOUNTER — SURGERY - HEALTHEAST (OUTPATIENT)
Dept: SURGERY | Facility: CLINIC | Age: 49
End: 2019-06-10

## 2019-06-10 ASSESSMENT — MIFFLIN-ST. JEOR: SCORE: 2017.38

## 2019-06-12 ASSESSMENT — MIFFLIN-ST. JEOR: SCORE: 2068.72

## 2019-06-13 ASSESSMENT — MIFFLIN-ST. JEOR: SCORE: 2074.16

## 2019-06-14 ASSESSMENT — MIFFLIN-ST. JEOR: SCORE: 2059.2

## 2019-06-15 ASSESSMENT — MIFFLIN-ST. JEOR
SCORE: 2065.09
SCORE: 2057.38

## 2019-06-16 ENCOUNTER — AMBULATORY - HEALTHEAST (OUTPATIENT)
Dept: CARDIOLOGY | Facility: CLINIC | Age: 49
End: 2019-06-16

## 2019-06-16 DIAGNOSIS — Z98.890 H/O ASCENDING AORTA REPAIR: ICD-10-CM

## 2019-06-16 ASSESSMENT — MIFFLIN-ST. JEOR: SCORE: 2052.84

## 2019-06-17 ENCOUNTER — RECORDS - HEALTHEAST (OUTPATIENT)
Dept: ADMINISTRATIVE | Facility: OTHER | Age: 49
End: 2019-06-17

## 2019-06-17 ENCOUNTER — HOSPITAL ENCOUNTER (INPATIENT)
Facility: CLINIC | Age: 49
LOS: 10 days | Discharge: HOME | DRG: 950 | End: 2019-06-27
Attending: PHYSICAL MEDICINE & REHABILITATION | Admitting: PHYSICAL MEDICINE & REHABILITATION
Payer: COMMERCIAL

## 2019-06-17 ENCOUNTER — AMBULATORY - HEALTHEAST (OUTPATIENT)
Dept: CARDIOLOGY | Facility: CLINIC | Age: 49
End: 2019-06-17

## 2019-06-17 DIAGNOSIS — J38.00 VOCAL CORD PARALYSIS: ICD-10-CM

## 2019-06-17 DIAGNOSIS — Z98.890 S/P AORTIC ANEURYSM REPAIR: Primary | ICD-10-CM

## 2019-06-17 DIAGNOSIS — Z86.79 S/P AORTIC ANEURYSM REPAIR: Primary | ICD-10-CM

## 2019-06-17 DIAGNOSIS — M10.9 GOUT, UNSPECIFIED CAUSE, UNSPECIFIED CHRONICITY, UNSPECIFIED SITE: ICD-10-CM

## 2019-06-17 PROCEDURE — 94640 AIRWAY INHALATION TREATMENT: CPT

## 2019-06-17 PROCEDURE — 97163 PT EVAL HIGH COMPLEX 45 MIN: CPT | Mod: GP

## 2019-06-17 PROCEDURE — 94640 AIRWAY INHALATION TREATMENT: CPT | Mod: 76

## 2019-06-17 PROCEDURE — 25000132 ZZH RX MED GY IP 250 OP 250 PS 637: Performed by: PHYSICAL MEDICINE & REHABILITATION

## 2019-06-17 PROCEDURE — 12800006 ZZH R&B REHAB

## 2019-06-17 PROCEDURE — 40000275 ZZH STATISTIC RCP TIME EA 10 MIN

## 2019-06-17 PROCEDURE — 25000125 ZZHC RX 250: Performed by: PHYSICAL MEDICINE & REHABILITATION

## 2019-06-17 RX ORDER — FUROSEMIDE 20 MG
40 TABLET ORAL DAILY
Status: DISCONTINUED | OUTPATIENT
Start: 2019-06-18 | End: 2019-06-27 | Stop reason: HOSPADM

## 2019-06-17 RX ORDER — AMOXICILLIN 250 MG
1 CAPSULE ORAL 2 TIMES DAILY PRN
Status: DISCONTINUED | OUTPATIENT
Start: 2019-06-17 | End: 2019-06-27 | Stop reason: HOSPADM

## 2019-06-17 RX ORDER — FUROSEMIDE 40 MG
40 TABLET ORAL DAILY
Status: ON HOLD | COMMUNITY
End: 2019-06-26

## 2019-06-17 RX ORDER — POTASSIUM CHLORIDE 1500 MG/1
20 TABLET, EXTENDED RELEASE ORAL
Status: ON HOLD | COMMUNITY
End: 2019-06-26

## 2019-06-17 RX ORDER — TAMSULOSIN HYDROCHLORIDE 0.4 MG/1
0.4 CAPSULE ORAL DAILY
Status: ON HOLD | COMMUNITY
End: 2019-06-26

## 2019-06-17 RX ORDER — ALLOPURINOL 300 MG/1
300 TABLET ORAL DAILY
Status: ON HOLD | COMMUNITY
End: 2019-06-26

## 2019-06-17 RX ORDER — ASPIRIN 81 MG/1
81 TABLET, CHEWABLE ORAL DAILY
Status: DISCONTINUED | OUTPATIENT
Start: 2019-06-18 | End: 2019-06-27 | Stop reason: HOSPADM

## 2019-06-17 RX ORDER — ONDANSETRON 4 MG/1
4 TABLET, FILM COATED ORAL EVERY 6 HOURS PRN
Status: DISCONTINUED | OUTPATIENT
Start: 2019-06-17 | End: 2019-06-27 | Stop reason: HOSPADM

## 2019-06-17 RX ORDER — METOPROLOL TARTRATE 50 MG
100 TABLET ORAL 2 TIMES DAILY
Status: DISCONTINUED | OUTPATIENT
Start: 2019-06-17 | End: 2019-06-27 | Stop reason: HOSPADM

## 2019-06-17 RX ORDER — POTASSIUM CHLORIDE 750 MG/1
20 TABLET, EXTENDED RELEASE ORAL 2 TIMES DAILY
Status: DISCONTINUED | OUTPATIENT
Start: 2019-06-17 | End: 2019-06-24 | Stop reason: DRUGHIGH

## 2019-06-17 RX ORDER — ATORVASTATIN CALCIUM 40 MG/1
40 TABLET, FILM COATED ORAL AT BEDTIME
Status: DISCONTINUED | OUTPATIENT
Start: 2019-06-17 | End: 2019-06-27 | Stop reason: HOSPADM

## 2019-06-17 RX ORDER — AMLODIPINE BESYLATE 10 MG/1
10 TABLET ORAL EVERY EVENING
Status: ON HOLD | COMMUNITY
End: 2019-06-26

## 2019-06-17 RX ORDER — TAMSULOSIN HYDROCHLORIDE 0.4 MG/1
0.4 CAPSULE ORAL DAILY
Status: DISCONTINUED | OUTPATIENT
Start: 2019-06-18 | End: 2019-06-27 | Stop reason: HOSPADM

## 2019-06-17 RX ORDER — ACETAMINOPHEN 325 MG/1
325-650 TABLET ORAL EVERY 4 HOURS PRN
Status: DISCONTINUED | OUTPATIENT
Start: 2019-06-17 | End: 2019-06-27 | Stop reason: HOSPADM

## 2019-06-17 RX ORDER — AMLODIPINE BESYLATE 10 MG/1
10 TABLET ORAL EVERY EVENING
Status: DISCONTINUED | OUTPATIENT
Start: 2019-06-17 | End: 2019-06-27 | Stop reason: HOSPADM

## 2019-06-17 RX ORDER — MULTIPLE VITAMINS W/ MINERALS TAB 9MG-400MCG
1 TAB ORAL DAILY
Status: DISCONTINUED | OUTPATIENT
Start: 2019-06-18 | End: 2019-06-27 | Stop reason: HOSPADM

## 2019-06-17 RX ORDER — POLYETHYLENE GLYCOL 3350 17 G/17G
17 POWDER, FOR SOLUTION ORAL DAILY PRN
Status: DISCONTINUED | OUTPATIENT
Start: 2019-06-17 | End: 2019-06-27 | Stop reason: HOSPADM

## 2019-06-17 RX ORDER — ASPIRIN 81 MG
100 TABLET, DELAYED RELEASE (ENTERIC COATED) ORAL 2 TIMES DAILY PRN
Status: ON HOLD | COMMUNITY
End: 2019-06-26

## 2019-06-17 RX ORDER — ASPIRIN 81 MG/1
81 TABLET, CHEWABLE ORAL DAILY
Status: ON HOLD | COMMUNITY
End: 2019-06-26

## 2019-06-17 RX ORDER — METOPROLOL TARTRATE 100 MG
100 TABLET ORAL 2 TIMES DAILY
Status: ON HOLD | COMMUNITY
End: 2019-06-26

## 2019-06-17 RX ORDER — ACETAMINOPHEN 325 MG/1
650 TABLET ORAL EVERY 4 HOURS PRN
Status: ON HOLD | COMMUNITY
End: 2019-06-26

## 2019-06-17 RX ORDER — IPRATROPIUM BROMIDE AND ALBUTEROL SULFATE 2.5; .5 MG/3ML; MG/3ML
3 SOLUTION RESPIRATORY (INHALATION) EVERY 4 HOURS PRN
Status: DISCONTINUED | OUTPATIENT
Start: 2019-06-17 | End: 2019-06-27 | Stop reason: HOSPADM

## 2019-06-17 RX ORDER — ALLOPURINOL 100 MG/1
100 TABLET ORAL DAILY
Status: DISCONTINUED | OUTPATIENT
Start: 2019-06-18 | End: 2019-06-27 | Stop reason: HOSPADM

## 2019-06-17 RX ORDER — HYDROXYZINE HYDROCHLORIDE 25 MG/1
25 TABLET, FILM COATED ORAL EVERY 6 HOURS PRN
Status: DISCONTINUED | OUTPATIENT
Start: 2019-06-17 | End: 2019-06-27 | Stop reason: HOSPADM

## 2019-06-17 RX ORDER — BISACODYL 10 MG
10 SUPPOSITORY, RECTAL RECTAL DAILY PRN
Status: DISCONTINUED | OUTPATIENT
Start: 2019-06-17 | End: 2019-06-27 | Stop reason: HOSPADM

## 2019-06-17 RX ORDER — ATORVASTATIN CALCIUM 40 MG/1
40 TABLET, FILM COATED ORAL AT BEDTIME
Status: ON HOLD | COMMUNITY
End: 2019-06-26

## 2019-06-17 RX ADMIN — ACETAMINOPHEN 650 MG: 325 TABLET, FILM COATED ORAL at 14:29

## 2019-06-17 RX ADMIN — METOPROLOL TARTRATE 100 MG: 50 TABLET, FILM COATED ORAL at 21:59

## 2019-06-17 RX ADMIN — IPRATROPIUM BROMIDE AND ALBUTEROL SULFATE 3 ML: .5; 3 SOLUTION RESPIRATORY (INHALATION) at 14:43

## 2019-06-17 RX ADMIN — HYDROXYZINE HYDROCHLORIDE 25 MG: 25 TABLET ORAL at 21:59

## 2019-06-17 RX ADMIN — AMLODIPINE BESYLATE 10 MG: 10 TABLET ORAL at 21:58

## 2019-06-17 RX ADMIN — IPRATROPIUM BROMIDE AND ALBUTEROL SULFATE 3 ML: .5; 3 SOLUTION RESPIRATORY (INHALATION) at 18:29

## 2019-06-17 RX ADMIN — POTASSIUM CHLORIDE 20 MEQ: 10 TABLET, EXTENDED RELEASE ORAL at 21:59

## 2019-06-17 RX ADMIN — ATORVASTATIN CALCIUM 40 MG: 40 TABLET, FILM COATED ORAL at 21:58

## 2019-06-17 ASSESSMENT — ACTIVITIES OF DAILY LIVING (ADL)
TRANSFERRING: 0-->INDEPENDENT
AMBULATION: 0-->INDEPENDENT
PRIOR_FUNCTIONAL_LEVEL_COMMENT: INDEPENDENT
BATHING: 0-->INDEPENDENT
COGNITION: 0 - NO COGNITION ISSUES REPORTED
TOILETING: 0-->INDEPENDENT
FALL_HISTORY_WITHIN_LAST_SIX_MONTHS: NO
RETIRED_EATING: 0-->INDEPENDENT
RETIRED_COMMUNICATION: 0-->UNDERSTANDS/COMMUNICATES WITHOUT DIFFICULTY
SWALLOWING: 0-->SWALLOWS FOODS/LIQUIDS WITHOUT DIFFICULTY
DRESS: 0-->INDEPENDENT
WHICH_OF_THE_ABOVE_FUNCTIONAL_RISKS_HAD_A_RECENT_ONSET_OR_CHANGE?: AMBULATION;TRANSFERRING;BATHING;DRESSING

## 2019-06-17 ASSESSMENT — MIFFLIN-ST. JEOR
SCORE: 2039.69
SCORE: 2056.49

## 2019-06-17 NOTE — H&P
[facility]   Acute Rehabilitation Unit  Admission History and Physical    chief complaint   Shortness of breath    REHAB DIAGNOSIS  Cardiac s/p repair of ascending aorta and aortic arch aneurysm    History of Present illness  Yadiel Roth is a 49 year old male with a PMH including but not limited to HTN, gout, LVH, carpal tunnel syndrome, prediabetes, morbid obesity, and an ascending aortic aneurysm who presented to [facility] on 6/10/19 for a planned repair of ascending aortic aneurysm and aortic arch.  No intra-operative complications noted.  Post-operatively had pain, hypoxia requiring supplemental oxygen, and urinary retention requiring temporary Jenkins catheter.  Has now been weaned off opioid pain medications at the time of discharge, and chest tubes have been removed.      Currently Mr. Roth is feeling short of breath, and feels like he is having difficulty coughing and clearing his lungs.  He also has incisional pain with coughing.  Has dysphonia which has been present since the surgery.  Denies any problems with bowel or bladder, and appetite has been good.  Denies nausea or vomiting.      FUNCTIONAL HISTORY  Current Functional Status:  PT:  Mod A for rolling and supine to sit transfers  STS transfers with CGA and WW  Min A for bed to chair transfers  Ambulating 350 ft, WW, SBA, but very slow paced  CGA for standing balance    OT:  SBA for standing hand hygiene  Min A for LBD (pants) and CGA (socks)  CGA for toilet transfers  Mod A showers    Prior Functional Status:  Independent with ambulation, mobility, all ADLs, and all IADLs    PAST Medical History  HTN, gout, LVH, carpal tunnel syndrome, prediabetes, morbid obesity, and an ascending aortic aneurysm    Surgical History  Aortic aneurysm repair 6/10/19    SOCIAL HISTORY  Reviewed and updated in Epic.  Marital Status:   Living situation: Lives in a house with 10 steps to enter, and another  20 to get to his living space.  Says his wife stays with him.  Tobacco use: Denies  Alcohol use: Denies  Illicit drug use: Denies    Social History     Socioeconomic History     Marital status: Not on file     Spouse name: Not on file     Number of children: Not on file     Years of education: Not on file     Highest education level: Not on file   Occupational History     Not on file   Social Needs     Financial resource strain: Not on file     Food insecurity:     Worry: Not on file     Inability: Not on file     Transportation needs:     Medical: Not on file     Non-medical: Not on file   Tobacco Use     Smoking status: Not on file   Substance and Sexual Activity     Alcohol use: Not on file     Drug use: Not on file     Sexual activity: Not on file   Lifestyle     Physical activity:     Days per week: Not on file     Minutes per session: Not on file     Stress: Not on file   Relationships     Social connections:     Talks on phone: Not on file     Gets together: Not on file     Attends [employer] service: Not on file     Active member of club or organization: Not on file     Attends meetings of clubs or organizations: Not on file     Relationship status: Not on file     Intimate partner violence:     Fear of current or ex partner: Not on file     Emotionally abused: Not on file     Physically abused: Not on file     Forced sexual activity: Not on file   Other Topics Concern     Not on file   Social History Narrative     Not on file       FAMILY HISTORY    Heart attack Father 53     Heart disease Father     Cancer Father     Cancer Mother     Gout Mother     Diabetes type II Maternal Grandmother     Snoring Maternal Grandfather     Seizures Son         Medications    Current Facility-Administered Medications   Medication     acetaminophen (TYLENOL) tablet 325-650 mg     [START ON 6/18/2019] allopurinol (ZYLOPRIM) tablet 100 mg     amLODIPine (NORVASC) tablet 10 mg     [START ON 6/18/2019] aspirin (ASA) chewable  "tablet 81 mg     atorvastatin (LIPITOR) tablet 40 mg     bisacodyl (DULCOLAX) Suppository 10 mg     [START ON 6/18/2019] furosemide (LASIX) tablet 40 mg     metoprolol tartrate (LOPRESSOR) tablet 100 mg     [START ON 6/18/2019] multivitamin w/minerals (THERA-VIT-M) tablet 1 tablet     polyethylene glycol (MIRALAX/GLYCOLAX) Packet 17 g     potassium chloride ER (K-DUR/KLOR-CON M) CR tablet 20 mEq     senna-docusate (SENOKOT-S/PERICOLACE) 8.6-50 MG per tablet 1 tablet     [START ON 6/18/2019] tamsulosin (FLOMAX) capsule 0.4 mg       ALLERGIES  NKDA      Review of Systems  A 10 point ROS was performed and negative unless otherwise noted in HPI.     Constitutional: Negative for fevers, chills  Eyes: Negative  Ears, Nose, Throat: Negative  Cardiovascular: +Incisional chest pain, SOB  Respiratory: +SOB  Gastrointestinal: Negative for constipation  Genitourinary: +Urinary retention  Musculoskeletal: Negative for weakness  Neurologic: Negative for HAs, numbness, tingling  Psychiatric: Negative for depression      Physical Exam  VITAL SIGNS:  /62   Pulse 67   Temp 98.4  F (36.9  C) (Oral)   Resp 16   Ht 1.753 m (5' 9\")   Wt 120.6 kg (265 lb 14.4 oz)   SpO2 97%   BMI 39.27 kg/m    BMI:  Estimated body mass index is 39.27 kg/m  as calculated from the following:    Height as of this encounter: 1.753 m (5' 9\").    Weight as of this encounter: 120.6 kg (265 lb 14.4 oz).     General: NAD, pleasant and cooperative, obese  HEENT: NC/AT  Pulmonary: Non-labored breathing, decreased breath sounds, CTA b/l, no w/r/r   Cardiovascular: RRR, S1+S2, no m/r/g.  Cardiac incision healing well, c/d/i.  Abdominal: Soft, NT/ND, BS+  Lower Extremities: 1+ edema b/l, no calf tenderness b/l  MSK/neuro:   Mental Status:  alert and oriented x3    Cranial Nerves: grossly normal   1. 2nd CN: Pupils equal, round, reactive to light and accomodation. and visual fields intact to confrontation.   2. 3rd,4th,6th CN:  EOMI, appropriate pupillary " responses  3. 5th CN: facial sensation intact   4. 7th CN: face symmetrical   5. 8th CN: functional hearing bilaterally  6. 9th, 10th CN: palate elevates symmetrically   7. 11th CN: sternocleidomastoids and trapezii strong   8. 12th CN: tongue midline and without fasciculations    Sensory: Normal to light touch in bilateral upper and lower extremities   Strength:   Shoulder abd  EF  WE  EE    Finger abd  R 4 4 4 4 5 4  L 4 4 4 4 5 4  *Exam limited by cardiac precautions     HF  KE  DF  EHL  PF   R  5 5 5 5 5  L  5  5 5 5 5  Speech: Dysphonic     Labs  K 3.6 (6/17)  Cr 0.82 (6/17)  Plts 252 (6/16)  Hgb 9.5, hematocrit 29.9, WBCs 9.6 (6/15/19)      IMPRESSION  Yadiel Roth is a 49 year old male with a PMH including but not limited to HTN, gout, LVH, carpal tunnel syndrome, prediabetes, morbid obesity, and an ascending aortic aneurysm who presented to [facility] on 6/10/19 for a planned repair of ascending aortic aneurysm and aortic arch.  Hospital course complicated by post-operative pain, hypoxia requiring supplemental oxygen, and urinary retention requiring temporary Jenkins catheter.      Impairment group code: 09 Cardiac: s/p repair of ascending aorta aneurysm and aortic arch    PLAN  Rehabilitation  -The patient has impairments in strength, balance, and endurance, which are leading to activity limitations in ambulation, mobility, ADLs, and functional communication.  He will be admitted into a comprehensive, interdisciplinary, inpatient rehabilitation program including  PT, OT and SLP for 60 minutes of each on a daily basis.  The patient requires close supervision by a physiatrist for management and monitoring of active and chronic medical co-morbidities, and to ensure the patient's ability to fully participate and benefit from the rigorous program.  Additionally, the patient requires specialized rehabilitation nursing for monitoring of vital signs, medication administration, patient training and  education, monitoring of bowel and bladder, and wound care.  The assistance of the dietary and social work teams are also needed to optimize nutritional status and to collaborate and achieve the identified discharge goals.   A comprehensive individualized Care Plan will be formulated by the attending Physiatrist after initial evaluations by each Rehabilitation Team member at the initial team conference within four days of admission and updated/modified at subsequent team conferences.   This level of care and multidisciplinary approach is medically necessary and only available at the inpatient rehabilitation facility level of care.  The patient is willing to participate in 3 hours of therapy per day and has the potential for improvement.     Medical  1)CVS  -s/p repair of ascending aorta aneurysm and aortic arch   -ASA 81 mg daily   -Cardiac precautions - No lifting >10 lbs, minimal overhead reaching, no extreme arm movements    -Wash incisions daily with soap and water   -Daily weights   -Lasix 40 mg daily and potassium supplementation 20 mEq BID for 2 weeks.  Monitor kidney function and electrolytes   -CV surgery appointment 7/2/19 at 1:00 pm ([facility])   -Cardiology appointment 8/5? (will need to confirm) at [facility]   -Cardiac rehab after discharge  -HTN: Norvasc 10 mg every evening, Metoprolol 100 mg BID  -HLD: Lipitor 40 mg qHS    2)Pulmonary  -Pulmonary toilet - Continue EZPap QID with RT and duonebs q4h PRN  -Encourage incentive spirometry      3)FENGI  -Diet: Regular consistency solids, thin liquids.  Low salt and fat.  -Bowels: PRN Senokot-S, Miralax, Dulcolax suppository  -Zofran PRN  -MVT daily  -Monitor potassium levels.  Continue with 20 mEq BID supplementation while on Lasix.      4)  -Post-operative urinary retention, now with Jenkins removed  -Check PVRs x3, IC if >300 ml  -Cr 0.82 on 6/17.  Will check full BMP in the morning  -Continue with Flomax 0.4 mg daily      5)DVT  prophylaxis  -Mechanical only.  Ambulating adequate distances to deter DVTs so will not start chemoprophylaxis.      6)Pain  -Tylenol 325-650 mg q4h PRN    7)Endo  -Pre-Diabetes - Will order HbA1c in the morning.  Could not find one from records available to me.  Review of glucose levels shows mild elevation and needing rare insulin coverage prior to rehab admission      8)Heme  -Check CBC in am, last Hgb 9.5      9)Psych  -Monitor mood, consult health psych PRN      10)Social/Dispo  -Anticipate discharge home at a modified independent level for ambulation, mobility, and ADLs with the least restrictive assistive device  -ELOS: 5-7 days  -Rehab prognosis: Good  -Follow up appointments: PCP, CV surgery, cardiology    Code status: Full Code (Discussed with patient today)    Alex Marshall MD  Department of Rehabilitation Medicine  Pager: [phone]    Time Spent on this Encounter   I, Alex Marshall, spent a total of 70 minutes bedside and on the inpatient unit today managing the care of Yadiel Roth.  Over 50% of my time on the unit was spent counseling the patient and /or coordinating care.  See note for details.

## 2019-06-17 NOTE — H&P
Post Admission Physician Evaluation:    I have compared Yadiel Roth's condition on admission to acute rehabilitation to that outlined in the preadmission screen. History and physical exam performed by me.  No significant differences are identified and the patient remains appropriate for an inpatient rehabilitation facility level of care to manage medical issues and address functional impairments due to cardiac surgery.    Comorbid medical conditions being managed: HTN, HLD, pre-diabetes, gout, urinary retention    Prior functional level:   Independent with ambulation, mobility, all ADLs, and all IADLs    Present function:   PT:  Mod A for rolling and supine to sit transfers  STS transfers with CGA and WW  Min A for bed to chair transfers  Ambulating 350 ft, WW, SBA, but very slow paced  CGA for standing balance     OT:  SBA for standing hand hygiene  Min A for LBD (pants) and CGA (socks)  CGA for toilet transfers  Mod A showers    Anticipated rehabilitation course:   Anticipate discharge home at a modified independent level for ambulation, mobility, and ADLs with the least restrictive assistive device    Will benefit from intensive rehabilitation includin minutes each of PT, OT and SLP  Rehabilitation nursing  Close management by physiatry    Prognosis: Good    Estimated length of stay: 5-7 days    Alex Marshall MD  Department of Rehabilitation Medicine  Pager: [phone]

## 2019-06-17 NOTE — PLAN OF CARE
AOX4, able to make needs known. CGA with gait belt for transfers. Continent of bladder in the BR. Patient and spouse oriented to call light and room and they verbalize understanding. CO sternal pain; PRN tylenol administered. Expiratory wheezes noted; seen by respiratory therapy. Temp at 99.4 at 1530, nursing will continue to monitor and  with the admission  process

## 2019-06-17 NOTE — PLAN OF CARE
PT: Met with pt this evening to introduce rehab role, obtain subjective information and ensure equipment needs overnight. Pt up in chair with complaint of sternal pain and shortness of breath. Pt wishing to remain in chair for comfort, notified RN of status.

## 2019-06-18 LAB
ANION GAP SERPL CALCULATED.3IONS-SCNC: 7 MMOL/L (ref 3–14)
BUN SERPL-MCNC: 14 MG/DL (ref 7–30)
CALCIUM SERPL-MCNC: 8 MG/DL (ref 8.5–10.1)
CHLORIDE SERPL-SCNC: 104 MMOL/L (ref 94–109)
CO2 SERPL-SCNC: 27 MMOL/L (ref 20–32)
CREAT SERPL-MCNC: 0.92 MG/DL (ref 0.66–1.25)
ERYTHROCYTE [DISTWIDTH] IN BLOOD BY AUTOMATED COUNT: 14.4 % (ref 10–15)
GFR SERPL CREATININE-BSD FRML MDRD: >90 ML/MIN/{1.73_M2}
GLUCOSE BLDC GLUCOMTR-MCNC: 108 MG/DL (ref 70–99)
GLUCOSE BLDC GLUCOMTR-MCNC: 110 MG/DL (ref 70–99)
GLUCOSE BLDC GLUCOMTR-MCNC: 128 MG/DL (ref 70–99)
GLUCOSE BLDC GLUCOMTR-MCNC: 96 MG/DL (ref 70–99)
GLUCOSE SERPL-MCNC: 99 MG/DL (ref 70–99)
HBA1C MFR BLD: 8.9 % (ref 0–5.6)
HCT VFR BLD AUTO: 33.3 % (ref 40–53)
HGB BLD-MCNC: 10.7 G/DL (ref 13.3–17.7)
MCH RBC QN AUTO: 29.6 PG (ref 26.5–33)
MCHC RBC AUTO-ENTMCNC: 32.1 G/DL (ref 31.5–36.5)
MCV RBC AUTO: 92 FL (ref 78–100)
PLATELET # BLD AUTO: 306 10E9/L (ref 150–450)
POTASSIUM SERPL-SCNC: 4 MMOL/L (ref 3.4–5.3)
RBC # BLD AUTO: 3.61 10E12/L (ref 4.4–5.9)
SODIUM SERPL-SCNC: 138 MMOL/L (ref 133–144)
WBC # BLD AUTO: 11.9 10E9/L (ref 4–11)

## 2019-06-18 PROCEDURE — 83036 HEMOGLOBIN GLYCOSYLATED A1C: CPT | Performed by: PHYSICAL MEDICINE & REHABILITATION

## 2019-06-18 PROCEDURE — 97530 THERAPEUTIC ACTIVITIES: CPT | Mod: GP

## 2019-06-18 PROCEDURE — 12800006 ZZH R&B REHAB

## 2019-06-18 PROCEDURE — 97116 GAIT TRAINING THERAPY: CPT | Mod: GP

## 2019-06-18 PROCEDURE — 00000146 ZZHCL STATISTIC GLUCOSE BY METER IP

## 2019-06-18 PROCEDURE — 36415 COLL VENOUS BLD VENIPUNCTURE: CPT | Performed by: PHYSICAL MEDICINE & REHABILITATION

## 2019-06-18 PROCEDURE — 80048 BASIC METABOLIC PNL TOTAL CA: CPT | Performed by: PHYSICAL MEDICINE & REHABILITATION

## 2019-06-18 PROCEDURE — 85027 COMPLETE CBC AUTOMATED: CPT | Performed by: PHYSICAL MEDICINE & REHABILITATION

## 2019-06-18 PROCEDURE — 97163 PT EVAL HIGH COMPLEX 45 MIN: CPT | Mod: GP

## 2019-06-18 PROCEDURE — 25000132 ZZH RX MED GY IP 250 OP 250 PS 637: Performed by: PHYSICAL MEDICINE & REHABILITATION

## 2019-06-18 PROCEDURE — 97535 SELF CARE MNGMENT TRAINING: CPT | Mod: GO | Performed by: STUDENT IN AN ORGANIZED HEALTH CARE EDUCATION/TRAINING PROGRAM

## 2019-06-18 PROCEDURE — 97166 OT EVAL MOD COMPLEX 45 MIN: CPT | Mod: GO | Performed by: STUDENT IN AN ORGANIZED HEALTH CARE EDUCATION/TRAINING PROGRAM

## 2019-06-18 PROCEDURE — 97110 THERAPEUTIC EXERCISES: CPT | Mod: GP

## 2019-06-18 PROCEDURE — 92523 SPEECH SOUND LANG COMPREHEN: CPT | Mod: GN

## 2019-06-18 RX ORDER — NICOTINE POLACRILEX 4 MG
15-30 LOZENGE BUCCAL
Status: DISCONTINUED | OUTPATIENT
Start: 2019-06-18 | End: 2019-06-22

## 2019-06-18 RX ORDER — DEXTROSE MONOHYDRATE 25 G/50ML
25-50 INJECTION, SOLUTION INTRAVENOUS
Status: DISCONTINUED | OUTPATIENT
Start: 2019-06-18 | End: 2019-06-22

## 2019-06-18 RX ADMIN — FUROSEMIDE 40 MG: 20 TABLET ORAL at 08:14

## 2019-06-18 RX ADMIN — MULTIPLE VITAMINS W/ MINERALS TAB 1 TABLET: TAB at 08:15

## 2019-06-18 RX ADMIN — METOPROLOL TARTRATE 100 MG: 50 TABLET, FILM COATED ORAL at 08:14

## 2019-06-18 RX ADMIN — METOPROLOL TARTRATE 100 MG: 50 TABLET, FILM COATED ORAL at 21:07

## 2019-06-18 RX ADMIN — ATORVASTATIN CALCIUM 40 MG: 40 TABLET, FILM COATED ORAL at 21:07

## 2019-06-18 RX ADMIN — ACETAMINOPHEN 650 MG: 325 TABLET, FILM COATED ORAL at 01:05

## 2019-06-18 RX ADMIN — AMLODIPINE BESYLATE 10 MG: 10 TABLET ORAL at 21:07

## 2019-06-18 RX ADMIN — ALLOPURINOL 100 MG: 100 TABLET ORAL at 08:14

## 2019-06-18 RX ADMIN — TAMSULOSIN HYDROCHLORIDE 0.4 MG: 0.4 CAPSULE ORAL at 08:15

## 2019-06-18 RX ADMIN — ASPIRIN 81 MG CHEWABLE TABLET 81 MG: 81 TABLET CHEWABLE at 08:15

## 2019-06-18 RX ADMIN — POTASSIUM CHLORIDE 20 MEQ: 10 TABLET, EXTENDED RELEASE ORAL at 21:07

## 2019-06-18 RX ADMIN — POTASSIUM CHLORIDE 20 MEQ: 10 TABLET, EXTENDED RELEASE ORAL at 08:15

## 2019-06-18 ASSESSMENT — MIFFLIN-ST. JEOR: SCORE: 2045.15

## 2019-06-18 NOTE — PLAN OF CARE
Yadiel will need 10 days in order to return home with no support and OP PT.    Mobility Status: CGA FWW with amb, CGA STS transfer, stairs, car transfer and curb    Primary Barriers: 30 Stairs at home, limited activity tolerance, SOB and anxiety.    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 Pt on 1 L O2,  Amb 120' with FWW and CGA , able to complete 8 6'' stairs. Pt needs to progress in activity tolerance including amb and stairs to return to home with 30 stairs.

## 2019-06-18 NOTE — PLAN OF CARE
"FOCUS/GOAL  Bowel management, Bladder management, Wound care management and Mobility    ASSESSMENT, INTERVENTIONS AND CONTINUING PLAN FOR GOAL:    Patient is A&Ox4, continent of bowel/bladder. Patient walks to the bathroom and voids in the toilet. No BM this shift. Transfers SBA with no assistive device. No c/o pain but did c/o a \"choking\" feeling in his throat. Writer requested RT to give a neb, O2 at 2L for comfort. Also, contacted on call dr for Atarax order. Patient said he was anxious and would try an anxiety medication. Patient felt calmer after taking the Atarax (per pt report) and was able to relax and fall asleep. PVR: 15. Slight temp at start of shift but returned to WDL by end of shift. Continue POC.     "

## 2019-06-18 NOTE — PROGRESS NOTES
"  [facility]   Acute Rehabilitation Unit  Daily progress note    INTERVAL HISTORY  Over night Mr. Roth was having anxiety, and Atarax was ordered per on call physician which was helpful.  Today he has no new concerns or complaints.  Therapy evaluations ongoing.  Labs reviewed and A1c noted to be elevated at 8.9 which would fall into the diabetes range.  Denies chest pain or shortness of breath.          MEDICATIONS  Scheduled:    allopurinol  100 mg Oral Daily     amLODIPine  10 mg Oral QPM     aspirin  81 mg Oral Daily     atorvastatin  40 mg Oral At Bedtime     furosemide  40 mg Oral Daily     insulin aspart  1-7 Units Subcutaneous TID AC     insulin aspart  1-5 Units Subcutaneous At Bedtime     [START ON 6/19/2019] metFORMIN  500 mg Oral Daily with supper     metoprolol tartrate  100 mg Oral BID     multivitamin w/minerals  1 tablet Oral Daily     potassium chloride  20 mEq Oral BID     tamsulosin  0.4 mg Oral Daily        PRN:  acetaminophen, bisacodyl, glucose **OR** dextrose **OR** glucagon, hydrOXYzine, ipratropium - albuterol 0.5 mg/2.5 mg/3 mL, ondansetron, polyethylene glycol, senna-docusate       PHYSICAL EXAM  Patient Vitals for the past 24 hrs:   BP Temp Temp src Pulse Resp SpO2 Height Weight   06/18/19 0721 138/64 99.1  F (37.3  C) Oral 74 18 94 % -- --   06/18/19 0630 -- -- -- -- -- -- -- 119.5 kg (263 lb 6.4 oz)   06/18/19 0417 140/61 97.2  F (36.2  C) Oral 70 18 94 % -- --   06/17/19 2159 148/63 98.9  F (37.2  C) Oral 82 20 97 % -- --   06/17/19 2039 149/62 99.2  F (37.3  C) Oral 82 20 94 % -- --   06/17/19 1900 -- 96.9  F (36.1  C) Oral -- -- -- -- --   06/17/19 1535 128/57 99.4  F (37.4  C) Oral 78 16 94 % -- --   06/17/19 1435 105/53 99.1  F (37.3  C) Oral 73 16 97 % -- --   06/17/19 1028 143/62 98.4  F (36.9  C) Oral 67 16 97 % 1.753 m (5' 9\") 120.6 kg (265 lb 14.4 oz)       GEN: NAD, pleasant and cooperative, obese  HEENT: NC/AT, nasal cannula in " place  CVS: RRR, S1+S2, no m/r/g  PULM: CTA b/l, no w/r/r  ABD: Soft, NT, ND, bowel sounds present  EXT: 1+ edema b/l, no calf tenderness b/l  Neuro: Answers appropriately, follows commands    LABS  CBC RESULTS:   Recent Labs   Lab Test 06/18/19  0622   WBC 11.9*   RBC 3.61*   HGB 10.7*   HCT 33.3*   MCV 92   MCH 29.6   MCHC 32.1   RDW 14.4          Last Comprehensive Metabolic Panel:  Sodium   Date Value Ref Range Status   06/18/2019 138 133 - 144 mmol/L Final     Potassium   Date Value Ref Range Status   06/18/2019 4.0 3.4 - 5.3 mmol/L Final     Chloride   Date Value Ref Range Status   06/18/2019 104 94 - 109 mmol/L Final     Carbon Dioxide   Date Value Ref Range Status   06/18/2019 27 20 - 32 mmol/L Final     Anion Gap   Date Value Ref Range Status   06/18/2019 7 3 - 14 mmol/L Final     Glucose   Date Value Ref Range Status   06/18/2019 99 70 - 99 mg/dL Final     Urea Nitrogen   Date Value Ref Range Status   06/18/2019 14 7 - 30 mg/dL Final     Creatinine   Date Value Ref Range Status   06/18/2019 0.92 0.66 - 1.25 mg/dL Final     GFR Estimate   Date Value Ref Range Status   06/18/2019 >90 >60 mL/min/[1.73_m2] Final     Comment:     Non  GFR Calc  Starting 12/18/2018, serum creatinine based estimated GFR (eGFR) will be   calculated using the Chronic Kidney Disease Epidemiology Collaboration   (CKD-EPI) equation.       Calcium   Date Value Ref Range Status   06/18/2019 8.0 (L) 8.5 - 10.1 mg/dL Final       Recent Labs   Lab 06/18/19  0622   GLC 99     Lab Results   Component Value Date    A1C 8.9 06/18/2019         ASSESSMENT  Yadiel Roth is a 49 year old male with a PMH including but not limited to HTN, gout, LVH, carpal tunnel syndrome, prediabetes, morbid obesity, and an ascending aortic aneurysm who presented to [facility] on 6/10/19 for a planned repair of ascending aortic aneurysm and aortic arch.  Hospital course complicated by post-operative pain, hypoxia requiring  supplemental oxygen, and urinary retention requiring temporary Jenkins catheter.      Impairment group code: 09 Cardiac: s/p repair of ascending aorta aneurysm and aortic arch      PLAN  Rehabilitation  -Continue with inpatient rehabilitation including PT, OT, SLP, rehab nursing, social work, and close monitoring by physiatry  -The patient has impairments in strength, balance, and endurance, which are leading to activity limitations in ambulation, mobility, ADLs, and functional communication      Medical  1)CVS  -s/p repair of ascending aorta aneurysm and aortic arch               -ASA 81 mg daily               -Cardiac precautions - No lifting >10 lbs, minimal overhead reaching, no extreme arm movements                      -Wash incisions daily with soap and water               -Daily weights               -Lasix 40 mg daily and potassium supplementation 20 mEq BID for 2 weeks.  Monitor kidney function and electrolytes               -CV surgery appointment 7/2/19 at 1:00 pm ([facility])               -Cardiology appointment 8/5? (will need to confirm) at [facility]               -Cardiac rehab after discharge  -HTN: Norvasc 10 mg every evening, Metoprolol 100 mg BID  -HLD: Lipitor 40 mg qHS     2)Pulmonary  -Pulmonary toilet - Continue EZPap QID with RT and duonebs q4h PRN  -Encourage incentive spirometry        3)FENGI  -Diet: Regular consistency solids, thin liquids.  Low salt and fat.  -Bowels: PRN Senokot-S, Miralax, Dulcolax suppository  -Zofran PRN  -MVT daily  -Monitor potassium levels.  Continue with 20 mEq BID supplementation while on Lasix.  Potassium 4.0 on 6/18.          4)  -Post-operative urinary retention, now with Jenkins removed  -Check PVRs x3, IC if >300 ml.  PVR x1 15 ml.   -Cr 0.92 on 6/17.  Rest of BMP unremarkable.  -Continue with Flomax 0.4 mg daily        5)DVT prophylaxis  -Mechanical only.  Ambulating adequate distances to deter DVTs so will not start  chemoprophylaxis.        6)Pain  -Tylenol 325-650 mg q4h PRN     7)Endo  -Diabetes - HbA1c checked today was 8.9 which falls into the diabetes range.  Will order glucose checks, sliding scale, and diabetes education.  Pending glucose levels, will also likely start Metformin.          8)Heme  -Hgb 10.7 on 6/18  -WBCs 11.9 this morning.  Not febrile, but temperatures have been in the 99 range.  Will continue to monitor, if spikes fever of >100.4 will do infectious workup.  Continue to monitor.        9)Psych  -Monitor mood, consult health psych PRN        10)Social/Dispo  -Anticipate discharge home at a modified independent level for ambulation, mobility, and ADLs with the least restrictive assistive device  -ELOS: 5-7 days  -Rehab prognosis: Good  -Follow up appointments: PCP, CV surgery, cardiology     Code status: Full Code (Discussed with patient upon admission     Alex Marshall MD  Department of Rehabilitation Medicine  Pager: [phone]      Time Spent on this Encounter   I, Alex Marshall, spent a total of 35 minutes bedside and on the inpatient unit today managing the care of Yadiel Roth.  Over 50% of my time on the unit was spent counseling the patient and /or coordinating care regarding new diagnosis of diabetes, and medical and functional progress including formal team rounds. See note for details.

## 2019-06-18 NOTE — PHARMACY-MEDICATION REGIMEN REVIEW
Pharmacy Medication Regimen Review  Yadiel Roth is a 49 year old male who is currently in the Acute Rehab Unit.    Assessment: All medications have an appropriate indications, durations and no unnecessary use was found    Plan:   Continue current therapy.     Attending provider will be sent this note for review.  If there are any emergent issues noted above, pharmacist will contact provider directly by phone.      Pharmacy will periodically review the resident's medication regimen for any PRN medications not administered in > 72 hours and discontinue them. The pharmacist will discuss gradual dose reductions of psychopharmacologic medications with interdisciplinary team on a regular basis.    Please contact pharmacy if the above does not answer specific medication questions/concerns.    Background:  A pharmacist has reviewed all medications and pertinent medical history today.  Medications were reviewed for appropriate use and any irregularities found are listed with recommendations.      Current Facility-Administered Medications:      acetaminophen (TYLENOL) tablet 325-650 mg, 325-650 mg, Oral, Q4H PRN, Susanne Marshall MD, 650 mg at 06/18/19 0105     allopurinol (ZYLOPRIM) tablet 100 mg, 100 mg, Oral, Daily, Susanne Marshall MD, 100 mg at 06/18/19 0814     amLODIPine (NORVASC) tablet 10 mg, 10 mg, Oral, QPM, Susanne Marshall MD, 10 mg at 06/17/19 2158     aspirin (ASA) chewable tablet 81 mg, 81 mg, Oral, Daily, Susanne Marshall MD, 81 mg at 06/18/19 0815     atorvastatin (LIPITOR) tablet 40 mg, 40 mg, Oral, At Bedtime, Susanne Marshall MD, 40 mg at 06/17/19 2158     bisacodyl (DULCOLAX) Suppository 10 mg, 10 mg, Rectal, Daily PRN, Susanne Marshall MD     glucose gel 15-30 g, 15-30 g, Oral, Q15 Min PRN **OR** dextrose 50 % injection 25-50 mL, 25-50 mL, Intravenous, Q15 Min PRN **OR** glucagon injection 1 mg, 1 mg, Subcutaneous, Q15 Min PRN, Susanne Marshall MD     furosemide  (LASIX) tablet 40 mg, 40 mg, Oral, Daily, Susanne Marshall MD, 40 mg at 06/18/19 0814     hydrOXYzine (ATARAX) tablet 25 mg, 25 mg, Oral, Q6H PRN, Susanne Marshall MD, 25 mg at 06/17/19 2159     insulin aspart (NovoLOG) inj (RAPID ACTING), 1-7 Units, Subcutaneous, TID AC, Susanne Marshall MD     insulin aspart (NovoLOG) inj (RAPID ACTING), 1-5 Units, Subcutaneous, At Bedtime, Susanne Marshall MD     ipratropium - albuterol 0.5 mg/2.5 mg/3 mL (DUONEB) neb solution 3 mL, 3 mL, Nebulization, Q4H PRN, Susanne Marshall MD, 3 mL at 06/17/19 1829     [START ON 6/19/2019] metFORMIN (GLUCOPHAGE) tablet 500 mg, 500 mg, Oral, Daily with supper, Susanne Marshall MD     metoprolol tartrate (LOPRESSOR) tablet 100 mg, 100 mg, Oral, BID, Susanne Marshall MD, 100 mg at 06/18/19 0814     multivitamin w/minerals (THERA-VIT-M) tablet 1 tablet, 1 tablet, Oral, Daily, Susanne Marshall MD, 1 tablet at 06/18/19 0815     ondansetron (ZOFRAN) tablet 4 mg, 4 mg, Oral, Q6H PRN, Susanne Marshall MD     polyethylene glycol (MIRALAX/GLYCOLAX) Packet 17 g, 17 g, Oral, Daily PRN, Susanne Marshall MD     potassium chloride ER (K-DUR/KLOR-CON M) CR tablet 20 mEq, 20 mEq, Oral, BID, Susanne Marshall MD, 20 mEq at 06/18/19 0815     senna-docusate (SENOKOT-S/PERICOLACE) 8.6-50 MG per tablet 1 tablet, 1 tablet, Oral, BID PRN, Susanne Marshall MD     tamsulosin (FLOMAX) capsule 0.4 mg, 0.4 mg, Oral, Daily, Susanne Marshall MD, 0.4 mg at 06/18/19 0815  No current outpatient prescriptions on file.   PMH:   HTN, gout, LVH, carpal tunnel syndrome, prediabetes, morbid obesity, and an ascending aortic aneurysm     Latoya Enamorado, PharmD, BCPS

## 2019-06-18 NOTE — PROGRESS NOTES
Social Work: Initial Assessment with Discharge Plan    Patient Name: Yadiel Roth  : 1970  Age: 49 year old  MRN: [id number]  Completed assessment with: patient  Admitted to ARU: 19    Presenting Information   Date of SW assessment: 2019  Health Care Directive: Patient considering completing  Primary Health Care Agent: wife per [facility] policy  Secondary Health Care Agent: follow [facility] policy  Living Situation: apartment, no elevator  Previous Functional Status: independent  DME available: no  Patient and family understanding of hospitalization: yes  Cultural/Language/Spiritual Considerations: none noted      Physical Health  Reason for admission: Rehabilitation post repair of ascending aorta and aortic arch aneurysm  Provider Information   Primary Care Physician:Titi Griffin   : none    Mental Health/Chemical Dependency:   Diagnosis: none noted  Alcohol/Tobacco/Narcotis: denied  Support/Services in Place: none  Services Needed/Recommended: possible therapy  Sexuality/Intimacy: no concerns noted    Support System  Marital Status: , but  (not legally)  Family support: some from wife and kids (kids are 9 and 10), but rest of family is in Call  Other support available: none    Community Resources  Current in home services: none  Previous services: none    Financial/Employment/Education  Employment Status: employed at [employer]  Income Source: wages  Education: some college  Financial Concerns:  Concerned about continued employment due to extended absence  Insurance: [employer]      Discharge Plan   Patient and family discharge goal: home  Provided Education on discharge plan: YES  Patient agreeable to discharge plan:  YES  Provided education and attained signature for Medicare IM and IRF Patient Rights and Privacy Information provided to patient : n/a  Provided patient with [place] [facility] Resources: n/a  Barriers to discharge: medical  stability    Discharge Recommendations   Disposition: TBD pending progress  Transportation Needs: wife to provide  Name of Transportation Company and Phone: n/a    Additional comments   Patient interested in completing Health Care Directive. SW to assist.    Please invite to Care Conference:  wife      JOSEPH Grider, [facility]   Phone: [phone]  Pager: [phone]

## 2019-06-18 NOTE — PROGRESS NOTES
06/18/19 1200   General Information, SLP   Type of Evaluation Speech and Language;Voice   Type of Visit Initial   Start of Care Date 06/18/19   Onset of Illness/Injury or Date of Surgery - Date 06/10/19   Referring Physician Dr. Marshall   Patient/Family Goals Statement Get back to where he was and be able to go back to work   Pertinent History of Current Problem Yadiel Roth is a 49 year old male with a PMH including but not limited to HTN, gout, LVH, carpal tunnel syndrome, prediabetes, morbid obesity, and an ascending aortic aneurysm who presented to [facility] on [facility]/10/19 for a planned repair of ascending aortic aneurysm and aortic arch.  No intra-operative complications noted.  Post-operatively had pain, hypoxia requiring supplemental oxygen, and urinary retention requiring temporary Jenkins catheter.  Has now been weaned off opioid pain medications at the time of discharge, and chest tubes have been removed.  Currently Mr. Roth is feeling short of breath, and feels like he is having difficulty coughing and clearing his lungs.  He also has incisional pain with coughing.  Has dysphonia which has been present since the surgery.     General Info Comments SLP: Patient was seen by speech therapy for dysphagia assessment but cleared for regular texture diet and thin liquids.   Speech   Speech Comments Patient having some significant dysphonia with difficulties being able to extend his voice beyond just a whisper.  Patient is able to produce a cough though very uncomfortable to do so due to his sternal incisions.  Utilizing a whisper, patient is fully intelligible in a quieter setting   Language: Auditory Comprehension (understanding of spoken language)   Two Step Commands (out of 5 total) 5   Paragraph; Discourse Comprehension Test (out of 8 total; less than 7 is below mean) 8   Functional Assessment Scale (Auditory Comprehension) No Impairment   Comments (Auditory Comprehension) Completed tasks  appropriately without need for repetition.   Language: Verbal Expression (use of spoken language to express information)   Define Words; [place] Test for Differential Diagnosis Of Aphasia (out of 10 total) 9.5   Functional Assessment Scale (Verbal Expression) No Impairment   Comments (Verbal Expression) No word finding difficulties noted in casual conversation as well   Reading Comprehension (understanding of written language)   Sentences and Paragraphs; Mode Diagnostic Aphasia Exam (out of 10 total) 7   Functional Assessment Scale (Reading Comprehension) Mild Impairment   Comments (Reading Comprehension) Difficulties with the longer passages   Written Expression (use of writing to express information)   Generate Sentences; [place] Test for Differential Diagnosis Of Aphasia (out of 6 total) 6   Functional Assessment Scale (Written Expression) No Impairment   Comments (Written Expression) No difficulties noted.  Patient did miss 1 of the words but printing was lighter conceivably making the word into a different word.   Cognitive Status Examination   Cognitive Status Exam Comments Further informal evaluation yet to be completed but during more casual interactions, no impairments noted   Clinical Impression, SLP Eval   Criteria for Skilled Therapeutic Interventions Met Yes   SLP Diagnosis Moderate dysphonia   Influenced by the following factors/impairments Aortic aneurysm repair surgery   Functional limitations due to impairments Significant difficulties being able to utilize voice beyond a whisper   Rehab Potential Good, to achieve stated therapy goals   Rehab potential affected by Recent onset and good awareness of impairment   Therapy Frequency Daily   Predicted Duration of Therapy Intervention (days/wks) 1 week   Anticipated Discharge Disposition Home with Outpatient Therapy   Risks and Benefits of Treatment have been explained. Yes   Patient, Family & other staff in agreement with plan of care Yes    Clinical Impression Comments Patient presenting with moderate dysphonia.  Other language areas are generally within functional limits with some mild difficulties with reading comprehension with longer passages.  Suspect that aortic  aneurysm surgery because of the dysphonia.  Patient will require some education regarding vocal hygiene and likely will have ongoing speech therapy upon facility discharge and outpatient setting at a voice clinic.  Current level of function is significantly below his baseline and patient would benefit from skilled intervention to maximize speech production and for vocal conservation.   Total Evaluation Time      Total Evaluation Time (Minutes) 60

## 2019-06-18 NOTE — PLAN OF CARE
AOX4, uses call light and able to make needs known. CGA with gait belt for transfers. Continent of B&B. CO cough managed with lozenges with some relief. Reports improvement with breathing and pain better than yesterday, declined pain meds. PRN O2 for SOB, has been off O2 for most of the day. Denies nausea, calm and cooperative with cares. Continue to monitor per POC.

## 2019-06-18 NOTE — PLAN OF CARE
Acute Rehab Care Conference/Team Rounds    Type: Team Rounds    Present: Dr. Susanne Marshall, Oliver Bass RN, Fabiana Sotelo SW, Melissa Rosado OT, George Latif PT, Seferino Michael SLP, Sierra Warren , Jolynn Booker, Sahara Verdugo Dietician      Discharge Barriers/Treatment/Education    Rehab Diagnosis: Debility and deconditioning    Active Medical Co-morbidities/Prognosis: Aortic aneurysm s/p repair, DM II, anxiety, HTN, gout, urinary retention, hypokalemia    Safety: SBA with no assistive device. Uses call light appropriately    Pain: C/O sternal pain, PRN Tylenol given with good relief    Medications, Skin, Tubes/Lines:Takes pills whole with water. Skin intact. 1-2 L O2 for comfort and >90 sats.     Swallowing/Nutrition: No complaints of chewing or swallowing difficulties noted.  Patient reporting good appetite    Bowel/Bladder: Continent of bowel/bladder using the toilet. PVR:15.    Psychosocial: currently lives alone in apartment.  with two kids, working on reconciliation. Interested in completing Health Care Directive while in ARU.    ADLs/IADLs: Eval initiated this AM. Need to assess dressing, toileting and bathing but so far pt seems to be doing well at a CGA level using the walker. Limited by chest pain when coughing and moving, stiffness, endurance and strength. Need to progress pt to MOD I with ADL and light IADL prior to discharge. Need to determine bathroom DME. Barrers are endurance, strength, post op precautions level of assist and stairs at home. Anticipate OP CR at discharge.    Mobility: Full eval pending, demo sit<>stand FWW SBA, amb 150+ ft with FWW CGA with assist to manage O2 tank. Will be limited most by stairs (~30 at home) to return home. Need to assess stairs later this PM. Recommend OP PT, will likely use 4WW at discharge.    Cognition/Language: Cognitive linguistic evaluation yet to be completed but do not anticipate significant impairments.  Patient does  have very notable dysphonia.  Unclear currently what the causes but suspect either intubation or laryngeal nerve involvement.  Anticipate that patient will benefit from ongoing therapy in outpatient setting to continue addressing voice.  Intensity of treatment will likely be set at single 30 to 45-minute session per day or perhaps decreased to every other day treatment plan.    Community Re-Entry: amb limited community distances with 4WW SBA.    Transportation: requires assist    Decision maker: self    Plan of Care and goals reviewed and updated.    Discharge Plan/Recommendations    Fall Precautions: continue    Overall plan for the patient:   New admission yesterday.  Newly diagnosed diabetes, will need education, glucose monitoring, and likely medication management.  Nursing - Continent of bowel and bladder.  Anxiety and subjective dyspnea.  Has coughing with eating.  PT - Stairs will be biggest barrier, has 20-30 at home.  Have not done any yet.  Ambulated  ft with walker and assistance with oxygen tank.  OT - Evaluations ongoing, complains of pain.  Poor endurance.  SLP - Dysphonia present, and will do dysphagia screen.  ELOS 10 days, tentative discharge date of 6/27.        Utilization Review and Continued Stay Justification    Medical Necessity Criteria:    For any criteria that is not met, please document reason and plan for discharge, transfer, or modification of plan of care to address.    Requires intensive rehabilitation program to treat functional deficits?: Yes    Requires 3x per week or greater involvement of rehabilitation physician to oversee rehabilitation program?: Yes    Requires rehabilitation nursing interventions?: Yes    Patient is making functional progress?: Yes    There is a potential for additional functional progress? Yes    Patient is participating in therapy 3 hours per day a minimum of 5 days per week or 15 hours per week in 7 day period?:Yes    Has discharge needs that require  coordinated discharge planning approach?:Yes      Barriers/Concerns related to meeting medical necessity criteria:  None    Team Plan to Address Concern:  N/A    Final Physician Sign off    Statement of Approval: I have reviewed and agree with the recommendations and documentation in this care conference note.       Patient Goals  Social Work goal: patient/family will be involved in discharge planning  Social Work goal: appropriate services will be arranged and resources will be provided according to patient's needs     OT goal: hygiene/grooming: modified independent  OT goal: upper body dressing: Modified independent, within precautions  OT goal: lower body dressing: Modified independent, including set-up/clothing retrieval  OT goal: upper body bathing: Modified independent  OT goal: lower body bathing: Modified independent  OT Goal: transfer: Supervision/stand-by assist(tub transfer)  OT goal: toilet transfer/toileting: Modified independent, toilet transfer, cleaning and garment management  OT goal: meal preparation: Modified independent, with simple meal preparation  OT goal: perform aerobic activity with stable cardiovascular response: continuous activity, 30 minutes(ADL routine, household tasks)  OT/CONCETTA face-to-face collaboration occurred, patient progress and plan of care reviewed.         SLP Frequency: Daily  SLP goal 1: Patient will complete appropriate vocal exercises independently with 90% accuracy.     Patient Goal:  Pain Management: Patient shall self advocate for interventions to reduce pain to tolerable levels                    Patient/Family Goal: Medication Management: Patient shall pass MAP before discharge                             Goal: Safety Management: Patient shall appropriately use the call light to ask for assistance

## 2019-06-18 NOTE — PROGRESS NOTES
06/18/19 0743   Quick Adds   Type of Visit Initial Occupational Therapy Evaluation   Living Environment   Lives With spouse;child(susie), dependent   Living Arrangements apartment   Transportation Anticipated family or friend will provide   Living Environment Comment Pt lives with wife and 2 kids. 10 SUDHA and 20 stairs to get to apt once inside. Pt has a tub shower, No DME. Apt iteself is one level.    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 Pt was driving. Pt likes to play [employer].    Functional Level   Ambulation 0-->independent   Transferring 0-->independent   Toileting 0-->independent   Bathing 0-->independent   Dressing 0-->independent   Eating 0-->independent   Communication 0-->understands/communicates without difficulty   Swallowing 0-->swallows foods/liquids without difficulty   Cognition 0 - no cognition issues reported   Fall history within last six months no   Prior Functional Level Comment IND at baseline   General Information   Onset of Illness/Injury or Date of Surgery - Date 06/17/19   Referring Physician Dr Alex Marshall   Patient/Family Goals Statement to get back to normal, to do things he was able to do before   Additional Occupational Profile Info/Pertinent History of Current Problem Pt is s/p  repair of ascending aorta and aorta arch aneurysm   Precautions/Limitations fall precautions;sternal precautions   Weight-Bearing Status - LUE   (10 # lifting restriction)   Weight-Bearing Status - RUE   (10 # lifting restriction)   General Observations O2 on at 1L   Cognitive Status Examination   Orientation orientation to person, place and time   Level of Consciousness alert   Cognitive Comment Pt denies, WFL   Visual Perception   Visual Perception Comments Pt wears reading  glasses no changes   Sensory Examination   Sensory Comments Pt denies   Pain Assessment   Patient Currently in Pain Yes, see Vital Sign flowsheet  (4/10 at rest)   Range of Motion (ROM)   ROM Comment UE ROM intact but pt reports he is stiff   Strength   Strength Comments deconditioned after surgery. Did not functionally assess arm strength due to sternal precautions   Coordination   Coordination Comments WFL   Activities of Daily Living Analysis   Impairments Contributing to Impaired Activities of Daily Living pain;post surgical precautions;strength decreased  (endurance)   General Therapy Interventions   Planned Therapy Interventions ADL retraining;IADL retraining;strengthening;transfer training;progressive activity/exercise   Clinical Impression   Criteria for Skilled Therapeutic Interventions Met yes, treatment indicated   OT Diagnosis ADL deficits   Influenced by the following impairments strength, endurance, pain, post op precautions   Assessment of Occupational Performance 5 or more Performance Deficits   Identified Performance Deficits Defcits impact all aspects of ADL IND   Clinical Decision Making (Complexity) Moderate complexity   Therapy Frequency Daily   Predicted Duration of Therapy Intervention (days/wks) 7-10  days   Anticipated Equipment Needs at Discharge   (TBD)   Anticipated Discharge Disposition Home with Assist   Risks and Benefits of Treatment have been explained. Yes   Patient, Family & other staff in agreement with plan of care Yes   Total Evaluation Time   Total Evaluation Time (Minutes) 20

## 2019-06-18 NOTE — PROGRESS NOTES
"   06/17/19 1600   Quick Adds   Type of Visit Initial PT Evaluation       Present no   Living Environment   Lives With spouse;child(susie), dependent   Living Arrangements apartment   Home Accessibility stairs to enter home   Number of Stairs, Main Entrance other (see comments)  (20, 2 flights)   Stair Railings, Main Entrance railings on both sides of stairs   Transportation Anticipated family or friend will provide  (sedan)   Living Environment Comment PT: Pt reports two flights and no elevators to reach apartment. He reports that he and his wife are \"trying to work things out.\" When asked he reports that he will most likely being discharging home to her house but also reports anticipating need for increased IND at discharge.    Self-Care   Usual Activity Tolerance good   Current Activity Tolerance moderate   Regular Exercise No   Equipment Currently Used at Home cane, straight   Activity/Exercise/Self-Care Comment PT: At baseline pt works for [employer], reports being active for a fair amount of day. Has cane but has never used it. Enjoys playing [employer] and video games   Functional Level Prior   Ambulation 0-->independent   Transferring 0-->independent   Toileting 0-->independent   Bathing 0-->independent   Communication 0-->understands/communicates without difficulty   Swallowing 0-->swallows foods/liquids without difficulty   Cognition 0 - no cognition issues reported   Fall history within last six months no   Which of the above functional risks had a recent onset or change? ambulation;transferring;bathing;dressing   Prior Functional Level Comment Independent   General Information   Onset of Illness/Injury or Date of Surgery - Date 06/10/19   Referring Physician Dr. Marshall   Patient/Family Goals Statement Return home    Pertinent History of Current Problem (include personal factors and/or comorbidities that impact the POC) Ascending aortic aneurysm, HTN, gout, LVH, carpal tunnel syndrome, " prediabetes, morbid obesity   Precautions/Limitations sternal precautions   Weight-Bearing Status - LUE partial weight-bearing (% in comments)  (10 lbs)   Weight-Bearing Status - RUE partial weight-bearing (% in comments)  (10 lbs)   Heart Disease Risk Factors Diabetes;High blood pressure;Lack of physical activity;Overweight;Stress;Family history;Gender;Age;Race   Cognitive Status Examination   Level of Consciousness alert   Follows Commands and Answers Questions 100% of the time   Personal Safety and Judgment intact   Integumentary/Edema   Integumentary/Edema other (describe)   Integumentary/Edema Comments incision sites no concersn   Posture    Posture Forward head position;Protracted shoulders   Range of Motion (ROM)   ROM Comment All Sh ROM limited by chest pain. Limited functional IR and ER, unable to cross midline   Left Shoulder Flexion ROM   Left Shoulder Flexion AROM - degrees ~95   Left Shoulder ABduction ROM   Left Shoulder ABduction AROM - degrees ~85   Right Shoulder Flexion ROM   Right Shoulder Flexion AROM - degrees ~95   Right Shoulder ABduction ROM   Right Shoulder ABduction AROM - degrees ~85   Strength   Strength Comments UE strength limited by chest pain with overpressure   MMT: Shoulder   Shoulder Flexion - Left Side (2+/5) poor plus, left   Shoulder ABduction - Left Side (2+/5) poor plus, left   Shoulder Flexion - Right Side (2+/5) poor plus, right   Shoulder ABduction - Right Side (2+/5) poor plus, right   MMT: Hip, Rehab Eval   Hip Flexion - Left Side (3+/5) fair plus, left   Hip ABduction - Left Side (3+/5) fair plus, left   Hip Flexion - Right Side (3+/5) fair plus, right   Hip ABduction - Right Side (3+/5) fair plus, right   MMT: Knee, Rehab Eval   Knee Flexion - Left Side (2/5) poor, left   Knee Extension - Left Side (3+/5) fair plus, left   Knee Flexion - Right Side (3+/5) fair plus, right   Knee Extension - Right Side (3+/5) fair plus, right   ARC Assessment Only   Acute Rehab FIM See  FIM scores for Mobility/ADL Assessment   Balance   Balance other (describe)   Sitting Balance: Static good balance  (sit EOB independently during consult with Dr)   Sitting Balance: Dynamic fair balance   Sit-to-Stand Balance fair balance  (minimal use of walker )   Standing Balance: Static fair balance   Standing Balance: Dynamic fair balance  (Able march with min use of FWW to doff pants)   Systems Impairment Contributing to Balance Disturbance neuromuscular   Identified Impairments Contributing to Balance Disturbance pain;impaired postural control;decreased strength   Sensory Examination   Sensory Perception other (describe)   Sensation Light Touch   LLE w/i normal limits  (8/8 correct on foot and ankle)   RLE w/i normal limits  (8/8 on foot and ankle)   General Therapy Interventions   Planned Therapy Interventions balance training;bed mobility training;gait training;neuromuscular re-education;ROM;strengthening;stretching;transfer training;risk factor education;home program guidelines;progressive activity/exercise   Clinical Impression   Criteria for Skilled Therapeutic Intervention yes, treatment indicated   PT Diagnosis Decreased activity tolerance and deconditioning due to Repair of ascending aortic aneurysm   Influenced by the following impairments Weakness, decreased cardiovascular endurance   Functional limitations due to impairments Tranfers, gait, stairs, car transfers   Clinical Presentation Evolving/Changing   Clinical Presentation Rationale Gout symptoms changing, deconditioning, carpal tunnel, obesity; stairs to access apartment, variable assist at home (figuring out d/c plan with wife who he is currently  from), good motivation   Clinical Decision Making (Complexity) High complexity   Therapy Frequency Daily   Predicted Duration of Therapy Intervention (days/wks) 10 days   Anticipated Equipment Needs at Discharge walker;front wheeled walker  (vs)   Anticipated Discharge Disposition Home with  Home Therapy   Risk & Benefits of therapy have been explained Yes   Patient, Family & other staff in agreement with plan of care Yes   Clinical Impression Comments see care plan note   Total Evaluation Time   Total Evaluation Time (Minutes) 25

## 2019-06-19 ENCOUNTER — COMMUNICATION - HEALTHEAST (OUTPATIENT)
Dept: FAMILY MEDICINE | Facility: CLINIC | Age: 49
End: 2019-06-19

## 2019-06-19 LAB
BASOPHILS # BLD AUTO: 0 10E9/L (ref 0–0.2)
BASOPHILS NFR BLD AUTO: 0.2 %
DIFFERENTIAL METHOD BLD: ABNORMAL
EOSINOPHIL # BLD AUTO: 0.2 10E9/L (ref 0–0.7)
EOSINOPHIL NFR BLD AUTO: 2 %
ERYTHROCYTE [DISTWIDTH] IN BLOOD BY AUTOMATED COUNT: 14.4 % (ref 10–15)
GLUCOSE BLDC GLUCOMTR-MCNC: 100 MG/DL (ref 70–99)
GLUCOSE BLDC GLUCOMTR-MCNC: 102 MG/DL (ref 70–99)
GLUCOSE BLDC GLUCOMTR-MCNC: 112 MG/DL (ref 70–99)
GLUCOSE BLDC GLUCOMTR-MCNC: 97 MG/DL (ref 70–99)
HCT VFR BLD AUTO: 32.5 % (ref 40–53)
HGB BLD-MCNC: 10.4 G/DL (ref 13.3–17.7)
IMM GRANULOCYTES # BLD: 0.5 10E9/L (ref 0–0.4)
IMM GRANULOCYTES NFR BLD: 4 %
LYMPHOCYTES # BLD AUTO: 1.8 10E9/L (ref 0.8–5.3)
LYMPHOCYTES NFR BLD AUTO: 15.7 %
MCH RBC QN AUTO: 28.9 PG (ref 26.5–33)
MCHC RBC AUTO-ENTMCNC: 32 G/DL (ref 31.5–36.5)
MCV RBC AUTO: 90 FL (ref 78–100)
MONOCYTES # BLD AUTO: 1.2 10E9/L (ref 0–1.3)
MONOCYTES NFR BLD AUTO: 10.2 %
NEUTROPHILS # BLD AUTO: 7.7 10E9/L (ref 1.6–8.3)
NEUTROPHILS NFR BLD AUTO: 67.9 %
NRBC # BLD AUTO: 0 10*3/UL
NRBC BLD AUTO-RTO: 0 /100
PLATELET # BLD AUTO: 359 10E9/L (ref 150–450)
RBC # BLD AUTO: 3.6 10E12/L (ref 4.4–5.9)
WBC # BLD AUTO: 11.4 10E9/L (ref 4–11)

## 2019-06-19 PROCEDURE — 25000132 ZZH RX MED GY IP 250 OP 250 PS 637: Performed by: PHYSICAL MEDICINE & REHABILITATION

## 2019-06-19 PROCEDURE — 00000146 ZZHCL STATISTIC GLUCOSE BY METER IP

## 2019-06-19 PROCEDURE — 92507 TX SP LANG VOICE COMM INDIV: CPT | Mod: GN

## 2019-06-19 PROCEDURE — 92610 EVALUATE SWALLOWING FUNCTION: CPT | Mod: GN

## 2019-06-19 PROCEDURE — 97530 THERAPEUTIC ACTIVITIES: CPT | Mod: GP

## 2019-06-19 PROCEDURE — 97530 THERAPEUTIC ACTIVITIES: CPT | Mod: GO

## 2019-06-19 PROCEDURE — 36415 COLL VENOUS BLD VENIPUNCTURE: CPT | Performed by: PHYSICAL MEDICINE & REHABILITATION

## 2019-06-19 PROCEDURE — 12800006 ZZH R&B REHAB

## 2019-06-19 PROCEDURE — 97535 SELF CARE MNGMENT TRAINING: CPT | Mod: GO

## 2019-06-19 PROCEDURE — 97110 THERAPEUTIC EXERCISES: CPT | Mod: GP

## 2019-06-19 PROCEDURE — 85025 COMPLETE CBC W/AUTO DIFF WBC: CPT | Performed by: PHYSICAL MEDICINE & REHABILITATION

## 2019-06-19 RX ADMIN — AMLODIPINE BESYLATE 10 MG: 10 TABLET ORAL at 21:20

## 2019-06-19 RX ADMIN — POTASSIUM CHLORIDE 20 MEQ: 10 TABLET, EXTENDED RELEASE ORAL at 08:54

## 2019-06-19 RX ADMIN — METOPROLOL TARTRATE 100 MG: 50 TABLET, FILM COATED ORAL at 21:20

## 2019-06-19 RX ADMIN — BENZOCAINE, MENTHOL 2 LOZENGE: 15; 3.6 LOZENGE ORAL at 22:56

## 2019-06-19 RX ADMIN — TAMSULOSIN HYDROCHLORIDE 0.4 MG: 0.4 CAPSULE ORAL at 08:53

## 2019-06-19 RX ADMIN — ATORVASTATIN CALCIUM 40 MG: 40 TABLET, FILM COATED ORAL at 21:20

## 2019-06-19 RX ADMIN — POTASSIUM CHLORIDE 20 MEQ: 10 TABLET, EXTENDED RELEASE ORAL at 21:20

## 2019-06-19 RX ADMIN — METOPROLOL TARTRATE 100 MG: 50 TABLET, FILM COATED ORAL at 08:54

## 2019-06-19 RX ADMIN — ASPIRIN 81 MG CHEWABLE TABLET 81 MG: 81 TABLET CHEWABLE at 08:53

## 2019-06-19 RX ADMIN — ALLOPURINOL 100 MG: 100 TABLET ORAL at 08:53

## 2019-06-19 RX ADMIN — MULTIPLE VITAMINS W/ MINERALS TAB 1 TABLET: TAB at 08:53

## 2019-06-19 RX ADMIN — FUROSEMIDE 40 MG: 20 TABLET ORAL at 08:54

## 2019-06-19 ASSESSMENT — MIFFLIN-ST. JEOR: SCORE: 2034.27

## 2019-06-19 NOTE — PLAN OF CARE
PT: pt completed 6MWT, total of 350' with FWW, required seated rest break at 2:00 mins (for 1 minute) and 5:45 mins    Pre vitals  -/43  -O2-97% on 1 L  -HR 79    Post vitals  -/43  -O2 97% on 1 L   -HR 80

## 2019-06-19 NOTE — PLAN OF CARE
VSS; alert and oriented x4. BGs 110 and 108. 1L O2 in use; sats >90%. Denies SOB with 1L; mild FINNEGAN noted. Speech hoarse/quiet, but clear. Up to toilet x3 this shift requiring CGA and gait belt. Voiding spontaneously in bathroom; PVRs 77 and 78. LBM 6/16 per pt report. Skin intact w/exception of sternal incision; approximated with liquid bandage and CONCETTA. Previous chest tube sites x3 on upper abdomen WDL; cleansed with microklenz and new bandage applied. Numbness of fingers #4 and #5 of R hand. Tolerating regular diet well; denies pain. Able to make needs known. Calm and cooperative with nursing cares. Sternal precautions maintained.

## 2019-06-19 NOTE — PLAN OF CARE
OT Completed shower, dressing, grooming and toileting with SBA-CGA. Pt used hand held shower and shower chair for bathing. Pt currently using FWW for functional transfers and mobility in room and bathroom with CGA. Pt fatigues easily and needs frequent sitting rest breaks due to increased SOB with activities. Pt on O2 1L and O2 sat stayed above 96% throughout the OT session.

## 2019-06-19 NOTE — PROGRESS NOTES
"CLINICAL NUTRITION SERVICES - ASSESSMENT NOTE     Nutrition Prescription    RECOMMENDATIONS FOR MDs/PROVIDERS TO ORDER:  -Recommend change diet from low fat to low saturated fat or consider discontinue this restriction per MD discretion to help promote adequate po intakes post surgery.      Malnutrition Status:    -Unable to  determine    Recommendations already ordered by Registered Dietitian (RD):  -Ordered Boost Glucose Control vanilla with lunch and dinner for a trial    Future/Additional Recommendations:  -Encourage intakes of tid meals including protein sources + supplements.  -Monitor po intakes and weight trends.  Follow up for supplement acceptance and adjust as indicated.    -Follow up to provide diet education as indicated before discharge.     REASON FOR ASSESSMENT  Yadiel Roth is a/an 49 year old male assessed by the dietitian per RN verbal request (no consult entered).      NUTRITION HISTORY  Per chart review, pt seen by hospital RD on 6/13.  Per RD note:  \"He reports up until surgery, his appetite has been normal and he will have help with food preparation/grocery shopping when he goes home. He reports no weight loss PTA and shows no signs of muscle or fat wasting.\"    \"Education: Pt was too sleepy for complete education, but briefly introduced topics and placed education materials in binder. Patient reports he plans to attend outpatient cardiac rehab. Assured him he will have a chance to meet with a dietitian at that time. Advised him to eat what sounds good and focus on getting enough protein over the next few weeks and then he can focus on optimizing diet.\"    CURRENT NUTRITION ORDERS  Diet: 2 g Sodium, Low Fat  Room Service with Assist (per pt request)    Intake/Tolerance:PO intake 75% of dinner last night.  No other meal records available.  Per chart review, good appetite.  RN yesterday concerned pt is not making good food choices for post op healing.  RN also noted elevated Hgb A1C and " "potential need for DM diet education.  Per review of selections in [facility], pt is ordered small meals yesterday totalling 1453 kcals and 78 g protein.  On 6/17, pt ordered only lunch.  He states his cough is making it difficult to eat.  Note SLP saw pt today for bedside swallow evaluation and recommended Regular Diet with Thin Liquids.  Also recommending pt use a straw with thin liquids (see note for further details).      LABS  Labs reviewed  (6/18)  BG 96 to 128  (6/18)  Hgb A1C:  8.9% (H)    MEDICATIONS  Medications reviewed  Novolog Sliding Scale (medium resistance dosing)  Lasix    ANTHROPOMETRICS  Height: 175.3 cm (5' 9\")  Most Recent Weight: 118.4 kg (261 lb)    IBW: 160 lbs  BMI: Obesity Grade II BMI 35-39.9  Weight History:  Per chart review weight (3/29/19) 121.3 kg/267 lbs 7 oz.  Current weight is decreased 6 lbs from this.  Currently on Lasix.   Wt Readings from Last 10 Encounters:   06/19/19 118.4 kg (261 lb)       Dosing Weight: 84 kg (adjusted for >120% IBW using current weight 118.4 kg and IBW)    ASSESSED NUTRITION NEEDS  Estimated Energy Needs: 8080-4799 kcals/day (20 - 25 kcals/kg)  Justification: Obese  Estimated Protein Needs: 101-126 grams protein/day (1.2 - 1.5 grams of pro/kg)  Justification: Post-op  Estimated Fluid Needs:  (1 mL/kcal)   Justification: Per provider pending fluid status    PHYSICAL FINDINGS  See malnutrition section below.  Per chart review:  \"Chest tube sites covered w/ dry gauze and clean, dry, and intact. Continent of bowel and bladder.\"    MALNUTRITION  % Intake: <75% for >/=7days  % Weight Loss: Unable to assess  Subcutaneous Fat Loss: None observed  Muscle Loss: None observed  Fluid Accumulation/Edema: Mild  Malnutrition Diagnosis: Unable to determine due to incomplete nutrition status validation.    NUTRITION DIAGNOSIS  Inadequate oral intake related to decreased appetite as evidenced by pt report and review of meal orders show insufficient to meet assessed " needs.    INTERVENTIONS  Implementation  Nutrition Education: Encouraged intakes of tid meals including protein sources. Discussed supplement options.      Ordered Boost Glucose Control bid with lunch and dinner daily.  Ordered RS with Assist.       Goals  Patient to consume % of nutritionally adequate meal trays TID, or the equivalent with supplements/snacks.     Monitoring/Evaluation  Progress toward goals will be monitored and evaluated per protocol.    Debbie Verdugo RD, LD

## 2019-06-19 NOTE — PLAN OF CARE
FOCUS/GOAL  Bowel management, Bladder management, Pain management and Cognition/Memory/Judgment/Problem solving    ASSESSMENT, INTERVENTIONS AND CONTINUING PLAN FOR GOAL:  Pt is alert and oriented, denied pain, fever, or new changes in sensation, continent of bowel and bladder overnight, set bed alarm off and was reeducated on call light use, pt used call light from that point on, on continuous O2 at 1 LPM, dyspneic during transfers but SOB resolved quickly once at rest, dressing intact, no further care concerns at this time continue with POC.

## 2019-06-19 NOTE — PLAN OF CARE
FOCUS/GOAL  Bowel management, Bladder management, Pain management, Medical management and Mobility    ASSESSMENT, INTERVENTIONS AND CONTINUING PLAN FOR GOAL:  Patient A&O x4. CGA w/ walker. No complaints of pain during shift. Sternal incision WDL and open to air. Chest tube sites covered w/ dry gauze and clean, dry, and intact. Continent of bowel and bladder. 1 LPM of O2 via nasal cannula. Blood sugars were 102 and 97. Continue with plan of care.

## 2019-06-19 NOTE — PROGRESS NOTES
06/19/19 1200   General Information   Onset Date 06/10/19   Start of Care Date 06/19/19   Referring Physician Dr. Marshall   Patient Profile Review/OT: Additional Occupational Profile Info See Profile for full history and prior level of function   Patient/Family Goals Statement Be able to get back to home and work   Swallowing Evaluation Bedside swallow evaluation   Behaviorial Observations WFL (within functional limits)   Mode of current nutrition Oral diet   Type of oral diet Regular;Thin liquid   Comments Patient previously seen for dysphagia management but cleared for regular textures and thin liquids.  Patient referred again for bedside swallow evaluation due to multiple coughing episodes noted by other staff members.   Clinical Swallow Evaluation   Oral Musculature generally intact   Structural Abnormalities present  (Significant dysphonia)   Dentition other (see comments)  (Missing several teeth but fully functional)   Mucosal Quality good   Mandibular Strength and Mobility intact   Oral Labial Strength and Mobility WFL   Lingual Strength and Mobility WFL   Velar Elevation intact   Buccal Strength and Mobility intact   Laryngeal Function   (Very weak cough)   Clinical Swallow Eval: Thin Liquid Texture Trial   Mode of Presentation, Thin Liquids cup;straw;self-fed   Volume of Liquid or Food Presented Single swallows   Oral Phase of Swallow WFL   Pharyngeal Phase of Swallow coughing/choking   Diagnostic Statement Patient had single episode that appeared like penetration or aspiration when attempting to drink from the room of the cup.  When drinking with straw for the majority of his meal, patient did not have any overt signs and symptoms of aspiration or change in vocal quality.   Clinical Swallow Eval: Solid Food Texture Trial   Mode of Presentation, Solid spoon;self-fed   Volume of Solid Food Presented Single medium to large sized bites   Oral Phase, Solid WFL   Pharyngeal Phase, Solid intact   Diagnostic  Statement Tolerated solid food textures without any overt signs symptoms of aspiration or any significant oral residue.   Swallow Eval: Clinical Impressions   Skilled Criteria for Therapy Intervention Skilled criteria met.  Treatment indicated.   Functional Assessment Scale (FAS) 6   Treatment Diagnosis Minimal oral pharyngeal dysphagia   Diet texture recommendations Regular diet;Thin liquids   Recommended Feeding/Eating Techniques   (Use straw for liquids)   Therapy Frequency Daily   Predicted Duration of Therapy Intervention (days/wks) 1 week for dysphagia related goals   Risks and Benefits of Treatment have been explained. Yes   Patient, family and/or staff in agreement with Plan of Care Yes   Clinical Impression Comments Patient presenting with minimal oropharyngeal dysphagia characterized by difficulties tolerating thin liquids via rim of cup.  Patient was able to tolerate thin via straw without any overt signs symptoms of aspiration.  Patient reports that since he has been in the hospital he he has typically used straw for his liquids.  Recommend continuing to do so and patient provided with a small stash of extra straws to utilize.  We will continue to follow-up with patient with thin liquids to ensure consistent tolerance of his thin liquid diet.  No concerns held regarding solid food textures.  Current level of function is below his baseline and would benefit from skilled intervention to maximize swallowing safety.   Total Evaluation Time   Total Evaluation Time (Minutes) 30

## 2019-06-20 LAB
GLUCOSE BLDC GLUCOMTR-MCNC: 107 MG/DL (ref 70–99)
GLUCOSE BLDC GLUCOMTR-MCNC: 115 MG/DL (ref 70–99)
GLUCOSE BLDC GLUCOMTR-MCNC: 96 MG/DL (ref 70–99)
GLUCOSE BLDC GLUCOMTR-MCNC: 96 MG/DL (ref 70–99)

## 2019-06-20 PROCEDURE — 97535 SELF CARE MNGMENT TRAINING: CPT | Mod: GO | Performed by: OCCUPATIONAL THERAPIST

## 2019-06-20 PROCEDURE — 92507 TX SP LANG VOICE COMM INDIV: CPT | Mod: GN

## 2019-06-20 PROCEDURE — 25000132 ZZH RX MED GY IP 250 OP 250 PS 637: Performed by: PHYSICAL MEDICINE & REHABILITATION

## 2019-06-20 PROCEDURE — 97530 THERAPEUTIC ACTIVITIES: CPT | Mod: GP | Performed by: PHYSICAL THERAPIST

## 2019-06-20 PROCEDURE — 97110 THERAPEUTIC EXERCISES: CPT | Mod: GP | Performed by: PHYSICAL THERAPIST

## 2019-06-20 PROCEDURE — 00000146 ZZHCL STATISTIC GLUCOSE BY METER IP

## 2019-06-20 PROCEDURE — 97535 SELF CARE MNGMENT TRAINING: CPT | Mod: GO | Performed by: STUDENT IN AN ORGANIZED HEALTH CARE EDUCATION/TRAINING PROGRAM

## 2019-06-20 PROCEDURE — 97530 THERAPEUTIC ACTIVITIES: CPT | Mod: GO | Performed by: OCCUPATIONAL THERAPIST

## 2019-06-20 PROCEDURE — 12800006 ZZH R&B REHAB

## 2019-06-20 RX ADMIN — BENZOCAINE, MENTHOL 2 LOZENGE: 15; 3.6 LOZENGE ORAL at 16:07

## 2019-06-20 RX ADMIN — BENZOCAINE, MENTHOL 2 LOZENGE: 15; 3.6 LOZENGE ORAL at 06:25

## 2019-06-20 RX ADMIN — ATORVASTATIN CALCIUM 40 MG: 40 TABLET, FILM COATED ORAL at 21:19

## 2019-06-20 RX ADMIN — FUROSEMIDE 40 MG: 20 TABLET ORAL at 08:40

## 2019-06-20 RX ADMIN — METOPROLOL TARTRATE 100 MG: 50 TABLET, FILM COATED ORAL at 08:40

## 2019-06-20 RX ADMIN — TAMSULOSIN HYDROCHLORIDE 0.4 MG: 0.4 CAPSULE ORAL at 08:40

## 2019-06-20 RX ADMIN — POTASSIUM CHLORIDE 20 MEQ: 10 TABLET, EXTENDED RELEASE ORAL at 08:40

## 2019-06-20 RX ADMIN — METOPROLOL TARTRATE 100 MG: 50 TABLET, FILM COATED ORAL at 21:19

## 2019-06-20 RX ADMIN — MULTIPLE VITAMINS W/ MINERALS TAB 1 TABLET: TAB at 08:40

## 2019-06-20 RX ADMIN — AMLODIPINE BESYLATE 10 MG: 10 TABLET ORAL at 21:20

## 2019-06-20 RX ADMIN — BENZOCAINE, MENTHOL 2 LOZENGE: 15; 3.6 LOZENGE ORAL at 02:56

## 2019-06-20 RX ADMIN — ALLOPURINOL 100 MG: 100 TABLET ORAL at 08:40

## 2019-06-20 RX ADMIN — ASPIRIN 81 MG CHEWABLE TABLET 81 MG: 81 TABLET CHEWABLE at 08:40

## 2019-06-20 RX ADMIN — POTASSIUM CHLORIDE 20 MEQ: 10 TABLET, EXTENDED RELEASE ORAL at 21:19

## 2019-06-20 ASSESSMENT — MIFFLIN-ST. JEOR: SCORE: 2016.12

## 2019-06-20 NOTE — PLAN OF CARE
PT: Pt progressed with being able to climb 20 stairs, slowly, sba, then needing seated rest break, vitals stable. Pt needs frequent rest breaks.  Cont towards goals.

## 2019-06-20 NOTE — PROGRESS NOTES
Provided Health Care Directive forms (both long and short) to patient to complete if/when he sees fit. Explained need for notary or witnesses prior to signing.

## 2019-06-20 NOTE — PLAN OF CARE
FOCUS/GOAL  Bowel management, Bladder management, Pain management and Cognition/Memory/Judgment/Problem solving    ASSESSMENT, INTERVENTIONS AND CONTINUING PLAN FOR GOAL:  Pt is alert and oriented, raspy speech from being previously intubated, denied pain but reported throat soreness and was given cough drops, Pt reported waking up sort of breath and that the cough drops helped him not feel short of breath, O2 sats were 99% and pt declined chest pain, dizziness, or lightheadedness, calling well for needs, transferring with assist of 1 and ww continent of bowel and bladder, on continuous O 2 at 1 Lpm, no further care concerns at this time continue with POC.

## 2019-06-20 NOTE — PLAN OF CARE
A&O x4. CGA w/ walker. Denies pain. BGs 100 and 112. 1L O2 in use, mild FINNEGAN. Speech is hoarse and quiet. Continent of bowel and bladder. Voiding spontaneously in bathroom. Chest tube site dressing changed. Continue with POC.

## 2019-06-20 NOTE — PLAN OF CARE
FOCUS/GOAL  Bowel management, Bladder management, Medical management and Mobility    ASSESSMENT, INTERVENTIONS AND CONTINUING PLAN FOR GOAL:  Patient A&O x4. CGA w/ walker. No complaints of pain during shift. 1 LPM of O2 via NC. Continent of bowel and bladder. Had bright, bloody streaks when wiping after bowel movements today. MD notified and plan to monitor and get labs tomorrow. Blood sugars were 107 and 96. Continue with plan of care.

## 2019-06-20 NOTE — PLAN OF CARE
OT: Pt was on RA while eating and then after for approx 25 min during ADL. Starting O2 was 96% and after ADL O2 was still 96% on RA. Pt still requesting to have O2 on after session due to comfort when coughing.

## 2019-06-20 NOTE — PLAN OF CARE
Patient's propensity to cough was much more pronounced this date.  Patient almost constantly suppressing a cough.  Reports that he feels as though something is there that if he could just cough it out it would feel much better.  Patient was engaged in abdominal breathing exercises and interestingly when engaged in this exercise with focus on the breathing in, patient did not have any of the coughing episodes or suppression noted.  Patient was encouraged to complete this exercise for both the benefit of breath support as well as calming his voice down.  When opening his mouth wide to allow clinician to see the back part of his throat, patient was unable to do due to triggering that suppressed cough.  Patient was not successful with doing actual abdominal breathing and continues to breathe from his chest despite visual models.  Patient encouraged to continue trying on his own.

## 2019-06-20 NOTE — PROGRESS NOTES
"Attestation:     JOAQUIN MANCINI, [facility] staff , have reviewed and edited the following  student s note on 6/20/2019 at 4:26 PM.           SPIRITUAL HEALTH SERVICES  SPIRITUAL ASSESSMENT Progress Note  [facility] ([facility]) 5R     PRIMARY FOCUS:     Emotional/spiritual/Nondenominational distress    Support for coping    REFERRAL SOURCE: Suggested visit from Speech Therapy    ILLNESS CIRCUMSTANCES:   Reviewed documentation. Reflective conversation shared with Yadiel which integrated elements of illness and family narratives.     Context of Serious Illness/Symptom(s) - Yadiel explained that this was very sudden and his surgery brought about more health concerns. He said he was healthy before this and now it hurts to breathe from the incisions.     Resources for Support - Yadiel mentioned his wife Dia and his two kids Donna and Reagan.     DISTRESS:     Emotional/Spiritual/Existential Distress - Yadiel talked about being worried because his kids are worried about him. He also said he is worried about the other health concerns that have been arising.    Yazidism Distress - n/d    Social/Cultural/Economic Distress - n/d    SPIRITUAL/Presybeterian COPING:     Synagogue/Marlene - Yadiel said he believes in God but doesn't identify with a Nondenominational tradition.    Spiritual Practice(s) - Yadiel said he prays for his family's well being and for his own health and this help grounds him. We prayed together for each of his family members and for his quick recovery.   Emotional/Relational/Existential Connections - Yadiel mentioned his family as written above. He also said, \"I've cared for my mother, my grandmother, my grandfather, and they were all here and now I can care for myself here.\"   GOALS OF CARE:    Goals of Care - He said he wants to go home to be with his family again.    Meaning/Sense-Making - He said he is determined and his kids are his motivation.      PLAN: Follow up on ARU next week      Jolynn " Frederick Booker Intern  Pager [phone]

## 2019-06-20 NOTE — PROGRESS NOTES
"  [facility]   Acute Rehabilitation Unit  Daily progress note    INTERVAL HISTORY  Mr. Roth reports doing OK today. On call provider was notified of anxiety overnight, PRN atarax was given. Patient said it provided minimal relief. Patient denies history of depression or anxiety. He notes that his episodes of anxiousness stem from a sensation that he is choking. He says he feels he has something to cough up, but cannot get it out. He denies shortness of breath. He is on 1L O2 via NC this morning, patient says this is for comfort and \"to be on the safe side.\" Denies chest pain. These anxiety episodes last several minutes and he is able to just \"ride it out.\" The patient also notes some feelings of numbness on his R hand in digits 3, 4, and 5. He says this has been happening over the last several days and is similar to the feeling of when he hits his \"funny bone.\" He denies this sensation occurring immediately after surgery.     Discussed dispo plan with patient today. Aiming for discharge 6/27, which patient feels is reasonable. Biggest barrier is stairs, which was discussed with patient. If he is to return to his apt where he lives alone he would need to go up 10 SUDHA the building and then another 10 steps up to the second level. He has been discussing with his wife going to her house (at the moment they are  but trying to work through things) where she has 5 SUDHA. He would be able to stay on the first floor of her house vs. Going up to the second floor which is an additional ~15 steps. Patient will contiue discussing with her and let the team know the decision.     This afternoon, the patient noted bright red blood per rectum with stooling x 2. He noted it first in the toilet with his stool. Then about an hour later it was noted by patient and nursing on the toilet paper but not in the toilet. Patient denies pain, dizziness, lightheadedness, fast heart beat, abdominal " pain, nausea, rectal pain, history of hemorrhoids. Labs stable from yesterday, discussed monitoring the situation and recheck CBC tomorrow.       MEDICATIONS  Scheduled:    allopurinol  100 mg Oral Daily     amLODIPine  10 mg Oral QPM     aspirin  81 mg Oral Daily     atorvastatin  40 mg Oral At Bedtime     furosemide  40 mg Oral Daily     insulin aspart  1-7 Units Subcutaneous TID AC     insulin aspart  1-5 Units Subcutaneous At Bedtime     metoprolol tartrate  100 mg Oral BID     multivitamin w/minerals  1 tablet Oral Daily     potassium chloride  20 mEq Oral BID     tamsulosin  0.4 mg Oral Daily        PRN:  acetaminophen, sore throat lozenge, bisacodyl, glucose **OR** dextrose **OR** glucagon, hydrOXYzine, ipratropium - albuterol 0.5 mg/2.5 mg/3 mL, ondansetron, polyethylene glycol, senna-docusate       PHYSICAL EXAM  Patient Vitals for the past 24 hrs:   BP Temp Temp src Pulse Resp SpO2 Weight   06/20/19 0828 135/60 98.2  F (36.8  C) Oral 81 18 96 % --   06/20/19 0601 -- -- -- -- -- -- 116.6 kg (257 lb)   06/19/19 2120 140/61 -- -- 89 -- -- --   06/19/19 1538 128/55 99.2  F (37.3  C) Oral 77 18 97 % --   06/19/19 1332 115/45 -- -- 81 -- -- --       GEN: NAD, pleasant and cooperative, sitting up in chair finishing breakfast   HEENT: NC/AT, nasal cannula in place   CVS: RRR (HR 60s), no murmurs, sternal incision uncovered and c/d/i  PULM: CTAB, good breath sounds, no increased WOB, 1L o2 via NC  ABD: Soft, NT, obese, hypoactive bowel sounds present  EXT: moves all extremities spontaneously   Neuro: Answers appropriately, follows commands, strong and equal  strength b/l, diminished sensation in dorsal and palmar aspect of R digits 3-5, interosseus strength 5/5 on L but 3/5 with 5th digit on the R    LABS  CBC RESULTS:   Recent Labs   Lab Test 06/18/19  0622   WBC 11.9*   RBC 3.61*   HGB 10.7*   HCT 33.3*   MCV 92   MCH 29.6   MCHC 32.1   RDW 14.4          Last Comprehensive Metabolic Panel:  Sodium    Date Value Ref Range Status   06/18/2019 138 133 - 144 mmol/L Final     Potassium   Date Value Ref Range Status   06/18/2019 4.0 3.4 - 5.3 mmol/L Final     Chloride   Date Value Ref Range Status   06/18/2019 104 94 - 109 mmol/L Final     Carbon Dioxide   Date Value Ref Range Status   06/18/2019 27 20 - 32 mmol/L Final     Anion Gap   Date Value Ref Range Status   06/18/2019 7 3 - 14 mmol/L Final     Glucose   Date Value Ref Range Status   06/18/2019 99 70 - 99 mg/dL Final     Urea Nitrogen   Date Value Ref Range Status   06/18/2019 14 7 - 30 mg/dL Final     Creatinine   Date Value Ref Range Status   06/18/2019 0.92 0.66 - 1.25 mg/dL Final     GFR Estimate   Date Value Ref Range Status   06/18/2019 >90 >60 mL/min/[1.73_m2] Final     Comment:     Non  GFR Calc  Starting 12/18/2018, serum creatinine based estimated GFR (eGFR) will be   calculated using the Chronic Kidney Disease Epidemiology Collaboration   (CKD-EPI) equation.       Calcium   Date Value Ref Range Status   06/18/2019 8.0 (L) 8.5 - 10.1 mg/dL Final       Recent Labs   Lab 06/20/19  0833 06/19/19  2118 06/19/19  1710 06/19/19  1124 06/19/19  0736 06/18/19  2125  06/18/19  0622   GLC  --   --   --   --   --   --   --  99   * 112* 100* 97 102* 108*   < >  --     < > = values in this interval not displayed.     Lab Results   Component Value Date    A1C 8.9 06/18/2019         ASSESSMENT  Yadiel Roth is a 49 year old male with a PMH including but not limited to HTN, gout, LVH, carpal tunnel syndrome, prediabetes, morbid obesity, and an ascending aortic aneurysm who presented to [facility] on 6/10/19 for a planned repair of ascending aortic aneurysm and aortic arch.  Hospital course complicated by post-operative pain, hypoxia requiring supplemental oxygen, and urinary retention requiring temporary Jenkins catheter.      Impairment group code: 09 Cardiac: s/p repair of ascending aorta aneurysm and aortic  arch      PLAN  Rehabilitation  -Continue with inpatient rehabilitation including PT, OT, SLP, rehab nursing, social work, and close monitoring by physiatry  -The patient has impairments in strength, balance, and endurance, which are leading to activity limitations in ambulation, mobility, ADLs, and functional communication      Medical  1)CVS  -s/p repair of ascending aorta aneurysm and aortic arch               -ASA 81 mg daily               -Cardiac precautions - No lifting >10 lbs, minimal overhead reaching, no extreme arm movements                      -Wash incisions daily with soap and water               -Daily weights               -Lasix 40 mg daily and potassium supplementation 20 mEq BID for 2 weeks.  Monitor kidney function and electrolytes               -CV surgery appointment 7/2/19 at 1:00 pm ([facility])               -Cardiology appointment 8/5? (will need to confirm) at [facility]               -Cardiac rehab after discharge  -HTN: Norvasc 10 mg every evening, Metoprolol 100 mg BID  -HLD: Lipitor 40 mg qHS       2)Pulmonary  -Pulmonary toilet - Continue EZPap QID with RT and duonebs q4h PRN  -Encourage incentive spirometry  -O2 PRN and for comfort, wean as able         3)FENGI  -Diet: Regular consistency solids, thin liquids.  Low salt and fat.  -Bowels: PRN Senokot-S, Miralax, Dulcolax suppository  -Zofran PRN  -MVT daily   -Monitor potassium levels.  Continue with 20 mEq BID supplementation while on Lasix.  Potassium 4.0 on 6/18.  Repeat BMP on 6/21.        4)  -Post-operative urinary retention, now with Jenkins removed  -Check PVRs x3, IC if >300 ml.  PVR x1 15 ml.   -Cr stable.  -Continue with Flomax 0.4 mg daily        5)DVT prophylaxis  -Mechanical only.  Ambulating adequate distances to deter DVTs so will not start chemoprophylaxis.        6)Pain  -Tylenol 325-650 mg q4h PRN       7)Endo  -Diabetes - HbA1c, 8.9 = newly diagnosed diabetes. Sliding scale insulin and  "diabetes education ordered. Intention was to likely start Metformin, however, blood glucose values have been in 90s-110s. Will continue to monitor for a few days, but unlikely to start metformin.   - PCP f/u as outpatient       8)Heme  -Hgb 10.4 on 6/19, recheck 6/21. BRB per rectum noted 6/20 with BM x 2. No hx of hemorrhoids. See further details in note above. Will monitor and recheck CBC already ordered for tomorrow. If continues, increases in amount, or patient becomes symptomatic would recheck CBC tonight.   -WBCs 11.9, 11.4. Patient has not been febrile, but temperatures have been in the 99 range.  Continuing to monitor, repeat CBC tomorrow. Would initiate full infectious workup if spikes fever.       9)Psych: denies hx of anxiety or depression  -Atarax PRN for anxiety, sensation of \"choking\"  -Patient agreeable to rehab health psych consult - placed 6/20  -Could consider further meds if continued issues with anxiety and / or mood  -Alternative medicine services recommended (I.e. Acupuncture, essential oils, etc. )  - following        10)?Ulnar neuropathy: patient noted diminished sensation in digits 3-5 on the R on 6/20 that had been occurring for several days. Educated on positioning. Patient denied this as an issue previously. Some R 5th digit weakness. Monitor. Could consider EMG/NCS.      11)Social/Dispo  -Anticipate discharge home at a modified independent level for ambulation, mobility, and ADLs with the least restrictive assistive device  -ELOS: 6/27  -Rehab prognosis: Good  -Follow up appointments: PCP, CV surgery, cardiology     Code status: Full Code      Melina Escobar DO  PGY2 Department of Rehabilitation Medicine      Attendings note   Patient seen and examined   Reviewed the note above and agree with the plan of care above, which reflects my direct input.   Functionally, yanely is progressing, participating in therapies.  He was and she is last evening, and the physician on call was paged " and ordered a hydroxyzine as needed.  His 6-minute walk test is somewhat suboptimal.  Expected discharge date is 6-27- 19.    Another active issue is fresh blood streak per rectum without any symptoms.  Will check CBC with differential tomorrow continues to be asymptomatic.    Total of 25 min spent in this encounter more than 50% in counseling and coordination.   Sindhu Uriostegui MD, A

## 2019-06-21 LAB
ANION GAP SERPL CALCULATED.3IONS-SCNC: 5 MMOL/L (ref 3–14)
BUN SERPL-MCNC: 14 MG/DL (ref 7–30)
CALCIUM SERPL-MCNC: 8.4 MG/DL (ref 8.5–10.1)
CHLORIDE SERPL-SCNC: 105 MMOL/L (ref 94–109)
CO2 SERPL-SCNC: 26 MMOL/L (ref 20–32)
CREAT SERPL-MCNC: 0.93 MG/DL (ref 0.66–1.25)
ERYTHROCYTE [DISTWIDTH] IN BLOOD BY AUTOMATED COUNT: 14.2 % (ref 10–15)
GFR SERPL CREATININE-BSD FRML MDRD: >90 ML/MIN/{1.73_M2}
GLUCOSE BLDC GLUCOMTR-MCNC: 102 MG/DL (ref 70–99)
GLUCOSE BLDC GLUCOMTR-MCNC: 86 MG/DL (ref 70–99)
GLUCOSE BLDC GLUCOMTR-MCNC: 92 MG/DL (ref 70–99)
GLUCOSE BLDC GLUCOMTR-MCNC: 96 MG/DL (ref 70–99)
GLUCOSE SERPL-MCNC: 104 MG/DL (ref 70–99)
HCT VFR BLD AUTO: 34.1 % (ref 40–53)
HGB BLD-MCNC: 11.1 G/DL (ref 13.3–17.7)
MCH RBC QN AUTO: 28.7 PG (ref 26.5–33)
MCHC RBC AUTO-ENTMCNC: 32.6 G/DL (ref 31.5–36.5)
MCV RBC AUTO: 88 FL (ref 78–100)
PLATELET # BLD AUTO: 460 10E9/L (ref 150–450)
POTASSIUM SERPL-SCNC: 4 MMOL/L (ref 3.4–5.3)
RBC # BLD AUTO: 3.87 10E12/L (ref 4.4–5.9)
SODIUM SERPL-SCNC: 136 MMOL/L (ref 133–144)
WBC # BLD AUTO: 12.9 10E9/L (ref 4–11)

## 2019-06-21 PROCEDURE — 00000146 ZZHCL STATISTIC GLUCOSE BY METER IP

## 2019-06-21 PROCEDURE — 97750 PHYSICAL PERFORMANCE TEST: CPT | Mod: GP

## 2019-06-21 PROCEDURE — 97535 SELF CARE MNGMENT TRAINING: CPT | Mod: GO | Performed by: OCCUPATIONAL THERAPIST

## 2019-06-21 PROCEDURE — 97110 THERAPEUTIC EXERCISES: CPT | Mod: GP

## 2019-06-21 PROCEDURE — 25000132 ZZH RX MED GY IP 250 OP 250 PS 637: Performed by: PHYSICAL MEDICINE & REHABILITATION

## 2019-06-21 PROCEDURE — 97150 GROUP THERAPEUTIC PROCEDURES: CPT | Mod: GP | Performed by: PHYSICAL THERAPIST

## 2019-06-21 PROCEDURE — 12800006 ZZH R&B REHAB

## 2019-06-21 PROCEDURE — 85027 COMPLETE CBC AUTOMATED: CPT | Performed by: STUDENT IN AN ORGANIZED HEALTH CARE EDUCATION/TRAINING PROGRAM

## 2019-06-21 PROCEDURE — 80048 BASIC METABOLIC PNL TOTAL CA: CPT | Performed by: STUDENT IN AN ORGANIZED HEALTH CARE EDUCATION/TRAINING PROGRAM

## 2019-06-21 PROCEDURE — 97127 ZZHC SP THERAPEUTIC INTERVENTION W/FOCUS ON COGNITIVE FUNCTION,EA 15 MIN: CPT | Mod: GN | Performed by: SPEECH-LANGUAGE PATHOLOGIST

## 2019-06-21 PROCEDURE — 92526 ORAL FUNCTION THERAPY: CPT | Mod: GN | Performed by: SPEECH-LANGUAGE PATHOLOGIST

## 2019-06-21 PROCEDURE — 36416 COLLJ CAPILLARY BLOOD SPEC: CPT | Performed by: STUDENT IN AN ORGANIZED HEALTH CARE EDUCATION/TRAINING PROGRAM

## 2019-06-21 PROCEDURE — 97116 GAIT TRAINING THERAPY: CPT | Mod: GP

## 2019-06-21 PROCEDURE — 97110 THERAPEUTIC EXERCISES: CPT | Mod: GO | Performed by: OCCUPATIONAL THERAPIST

## 2019-06-21 RX ADMIN — AMLODIPINE BESYLATE 10 MG: 10 TABLET ORAL at 21:14

## 2019-06-21 RX ADMIN — FUROSEMIDE 40 MG: 20 TABLET ORAL at 08:49

## 2019-06-21 RX ADMIN — METOPROLOL TARTRATE 100 MG: 50 TABLET, FILM COATED ORAL at 08:50

## 2019-06-21 RX ADMIN — POTASSIUM CHLORIDE 20 MEQ: 10 TABLET, EXTENDED RELEASE ORAL at 08:49

## 2019-06-21 RX ADMIN — ALLOPURINOL 100 MG: 100 TABLET ORAL at 08:49

## 2019-06-21 RX ADMIN — ASPIRIN 81 MG CHEWABLE TABLET 81 MG: 81 TABLET CHEWABLE at 08:49

## 2019-06-21 RX ADMIN — TAMSULOSIN HYDROCHLORIDE 0.4 MG: 0.4 CAPSULE ORAL at 08:50

## 2019-06-21 RX ADMIN — BENZOCAINE, MENTHOL 1 LOZENGE: 15; 3.6 LOZENGE ORAL at 08:56

## 2019-06-21 RX ADMIN — BENZOCAINE, MENTHOL 2 LOZENGE: 15; 3.6 LOZENGE ORAL at 19:04

## 2019-06-21 RX ADMIN — BENZOCAINE, MENTHOL 2 LOZENGE: 15; 3.6 LOZENGE ORAL at 21:14

## 2019-06-21 RX ADMIN — ACETAMINOPHEN 650 MG: 325 TABLET, FILM COATED ORAL at 23:33

## 2019-06-21 RX ADMIN — METOPROLOL TARTRATE 100 MG: 50 TABLET, FILM COATED ORAL at 21:14

## 2019-06-21 RX ADMIN — MULTIPLE VITAMINS W/ MINERALS TAB 1 TABLET: TAB at 08:49

## 2019-06-21 RX ADMIN — ATORVASTATIN CALCIUM 40 MG: 40 TABLET, FILM COATED ORAL at 21:14

## 2019-06-21 RX ADMIN — POTASSIUM CHLORIDE 20 MEQ: 10 TABLET, EXTENDED RELEASE ORAL at 21:14

## 2019-06-21 ASSESSMENT — MIFFLIN-ST. JEOR: SCORE: 1992.54

## 2019-06-21 NOTE — PROGRESS NOTES
"   06/21/19 0900   SLP - Acute Rehab Center Time   Individual Time (minutes) - enter zero if not applicable - SLP 45   Group Time (minutes) - enter zero if not applicable  - SLP 0   Concurrent Time (minutes) - enter zero if not applicable  - SLP 0   Co-Treatment Time (minutes) - enter zero if not applicable  - SLP 0   ARC Total Session Time (minutes) - SLP 45   Comprehension (FIM)   Functional Performance Complete independence comprehending complex/abstract ideas   Expression (FIM)   Functional Performance Requires extra time;Requires repetition to get point across (see comments)   Expression Comment SLP: voice assessment, noting reduced phonation time/ability produce voice, often breathy, strained, but was variable (ie in conversation did better ), increased breath support did not readily help, as he takes breath exhales then speaks.    Memory (FIM)   Functional Performance Minimal prompting-recognizes and remembers 75-90% of the time   Memory Comment SLP: WJ-R test of auditory visual learning 6th percentile   Eating (FIM)   Describe performance SLP: pt states he \"coughs 30% of time on liquids\"  did not cough during session , but only few sips, educated small sips, so far ok straw, but monitor.  will plan to see at meal further assess ?if sx aspiration needing VFSS     "

## 2019-06-21 NOTE — PLAN OF CARE
Discharge Planner OT   Patient plan for discharge: Home with assist and continued therapy  Current status: Pt able to complete standing ADLs at sink with SBA; tolerated standing BUE exercise with close SBA as well.  Barriers to return to prior living situation: weakness, impaired balance, pain, surgical precautions  Recommendations for discharge: Home with OP therapy  Rationale for recommendations: to promote safety and (I) with ADL/IADLs       Entered by: Alena White 06/21/2019 11:50 AM

## 2019-06-21 NOTE — PLAN OF CARE
A&O x4. CGA w/ walker. Denies pain. On 1LPM of O2 via nasal cannula, mild FINNEGAN. Speech hoarse and quiet. Continent of bowel and bladder, using bathroom. No BM this shift. BG 96 and 115. Continue POC.

## 2019-06-21 NOTE — PROGRESS NOTES
[facility]   Acute Rehabilitation Unit  Daily progress note    INTERVAL HISTORY  No acute events overnight.  The patient has not noted any more blood per rectum, but he has not had a bowel movement since this occurrence yesterday either.  No abdominal pain.  Denies fevers, chills.  He feels that his breathing is improving, but he continues to wear oxygen 1 L via nasal cannula for comfort at times.  He is not wearing oxygen in therapies, only in his room in bed.  The patient states he is looking forward to getting back to normal.  He did 21+ steps today in therapies.  Patient says he was very athletic prior to admission, and he would like to get back to that.    MEDICATIONS  Scheduled:    allopurinol  100 mg Oral Daily     amLODIPine  10 mg Oral QPM     aspirin  81 mg Oral Daily     atorvastatin  40 mg Oral At Bedtime     furosemide  40 mg Oral Daily     insulin aspart  1-7 Units Subcutaneous TID AC     insulin aspart  1-5 Units Subcutaneous At Bedtime     metoprolol tartrate  100 mg Oral BID     multivitamin w/minerals  1 tablet Oral Daily     potassium chloride  20 mEq Oral BID     tamsulosin  0.4 mg Oral Daily        PRN:  acetaminophen, sore throat lozenge, bisacodyl, glucose **OR** dextrose **OR** glucagon, hydrOXYzine, ipratropium - albuterol 0.5 mg/2.5 mg/3 mL, ondansetron, polyethylene glycol, senna-docusate       PHYSICAL EXAM  Patient Vitals for the past 24 hrs:   BP Temp Temp src Pulse Resp SpO2 Weight   06/21/19 1220 144/57 -- -- 70 -- -- --   06/21/19 0818 111/47 98.6  F (37  C) Oral 84 20 96 % --   06/21/19 0544 -- -- -- -- -- -- 114.2 kg (251 lb 12.8 oz)   06/20/19 2120 122/57 -- -- -- -- -- --   06/20/19 1548 114/44 99.1  F (37.3  C) Oral 76 20 96 % --       GEN: NAD, pleasant and cooperative, resting in bed  HEENT: NC/AT, nasal cannula in place   CVS: RRR, no murmurs  PULM: CTAB, good breath sounds, no increased WOB, 1L o2 via NC  ABD: Soft, NT, obese, bowel  sounds present  EXT: moves all extremities spontaneously, nonpitting edema to bilateral lower extremities  Neuro: Answers appropriately, follows commands, greater than antigravity strength throughout bilateral upper extremities and bilateral lower extremities    LABS  CBC RESULTS:   Recent Labs   Lab Test 06/18/19  0622   WBC 11.9*   RBC 3.61*   HGB 10.7*   HCT 33.3*   MCV 92   MCH 29.6   MCHC 32.1   RDW 14.4          Last Comprehensive Metabolic Panel:  Sodium   Date Value Ref Range Status   06/21/2019 136 133 - 144 mmol/L Final     Potassium   Date Value Ref Range Status   06/21/2019 4.0 3.4 - 5.3 mmol/L Final     Chloride   Date Value Ref Range Status   06/21/2019 105 94 - 109 mmol/L Final     Carbon Dioxide   Date Value Ref Range Status   06/21/2019 26 20 - 32 mmol/L Final     Anion Gap   Date Value Ref Range Status   06/21/2019 5 3 - 14 mmol/L Final     Glucose   Date Value Ref Range Status   06/21/2019 104 (H) 70 - 99 mg/dL Final     Urea Nitrogen   Date Value Ref Range Status   06/21/2019 14 7 - 30 mg/dL Final     Creatinine   Date Value Ref Range Status   06/21/2019 0.93 0.66 - 1.25 mg/dL Final     GFR Estimate   Date Value Ref Range Status   06/21/2019 >90 >60 mL/min/[1.73_m2] Final     Comment:     Non  GFR Calc  Starting 12/18/2018, serum creatinine based estimated GFR (eGFR) will be   calculated using the Chronic Kidney Disease Epidemiology Collaboration   (CKD-EPI) equation.       Calcium   Date Value Ref Range Status   06/21/2019 8.4 (L) 8.5 - 10.1 mg/dL Final       Recent Labs   Lab 06/21/19  1215 06/21/19  1040 06/21/19  0745 06/20/19  2105 06/20/19  1718 06/20/19  1231 06/20/19  0833  06/18/19  0622   GLC  --  104*  --   --   --   --   --   --  99   BGM 86  --  96 115* 96 96 107*   < >  --     < > = values in this interval not displayed.     Lab Results   Component Value Date    A1C 8.9 06/18/2019         ASSESSMENT  Yadiel Roth is a 49 year old male with a PMH including  but not limited to HTN, gout, LVH, carpal tunnel syndrome, prediabetes, morbid obesity, and an ascending aortic aneurysm who presented to [facility] on 6/10/19 for a planned repair of ascending aortic aneurysm and aortic arch.  Hospital course complicated by post-operative pain, hypoxia requiring supplemental oxygen, and urinary retention requiring temporary Jenkins catheter.      Impairment group code: 09 Cardiac: s/p repair of ascending aorta aneurysm and aortic arch      PLAN  Rehabilitation  -Continue with inpatient rehabilitation including PT, OT, SLP, rehab nursing, social work, and close monitoring by physiatry  -The patient has impairments in strength, balance, and endurance, which are leading to activity limitations in ambulation, mobility, ADLs, and functional communication      Medical  1)CVS  -s/p repair of ascending aorta aneurysm and aortic arch               -ASA 81 mg daily               -Cardiac precautions - No lifting >10 lbs, minimal overhead reaching, no extreme arm movements                      -Wash incisions daily with soap and water               -Daily weights               -Lasix 40 mg daily and potassium supplementation 20 mEq BID for 2 weeks each (last dose 6/30).  Monitor kidney function and electrolytes               -CV surgery appointment 7/2/19 at 1:00 pm ([facility])               -Cardiology appointment 8/5? (will need to confirm) at [facility]               -Cardiac rehab after discharge  -HTN: Norvasc 10 mg every evening, Metoprolol 100 mg BID  -HLD: Lipitor 40 mg qHS       2)Pulmonary  -Pulmonary toilet - Continue EZPap QID with RT and duonebs q4h PRN  -Encourage incentive spirometry  -O2 PRN and for comfort - encouraging wean as no home O2        3)FENGI  -Diet: Regular consistency solids, thin liquids.  Low salt and fat.  -Bowels: PRN Senokot-S, Miralax, Dulcolax suppository  -Zofran PRN  -MVT daily   -Monitor potassium levels.  Continue with 20  "mEq BID supplementation while on Lasix (see above).  Potassium 4.0 on 6/18 and 6/21.        4)  -Post-operative urinary retention, now with Jenkins removed  -PVR x1 15 ml.   -Cr stable.  -Continue with Flomax 0.4 mg daily        5)DVT prophylaxis  -Mechanical only.  Ambulating adequate distances to deter DVTs so will not start chemoprophylaxis.        6)Pain  -Tylenol 325-650 mg q4h PRN       7)Endo  -Diabetes - HbA1c, 8.9 = newly diagnosed diabetes. Sliding scale insulin and diabetes education ordered. Intention was to likely start Metformin, however, blood glucose values have been in 80s-110s. Will continue to monitor for a few days, but unlikely to start metformin.   - PCP f/u as outpatient       8)Heme  -Hgb 10.4 on 6/19, recheck 6/21 was 11.1. BRB per rectum noted 6/20 with BM x 2. No hx of hemorrhoids.  6/21 no recurrence hemoglobin stable/improved  -WBCs 12.9. Patient has not been febrile, but temperatures had been in the 99 range, improving to more normalcy 6/21.  Would initiate full infectious workup if spikes fever.       9)Psych: denies hx of anxiety or depression  -Atarax PRN for anxiety, sensation of \"choking\"  -Patient agreeable to rehab health psych consult - placed 6/20  -Could consider further meds if continued issues with anxiety and / or mood  -Alternative medicine services recommended (I.e. Acupuncture, essential oils, etc. )  - following        10)?Ulnar neuropathy: patient noted diminished sensation in digits 3-5 on the R on 6/20 that had been occurring for several days. Educated on positioning. Patient denied this as an issue previously. Some R 5th digit weakness. Monitor. Could consider EMG/NCS.      11)Social/Dispo  -Anticipate discharge home at a modified independent level for ambulation, mobility, and ADLs with the least restrictive assistive device  -ELOS: 6/27  -Rehab prognosis: Good  -Follow up appointments: PCP, CV surgery, cardiology     Code status: Full Code      Melina Day, " DO  PGY2 Department of Rehabilitation Medicine      Attendings note   Patient seen and examined   Reviewed the note above and agree with the plan of care above, which reflects my direct input.   Functionally, yanely is progressing, participating in therapies.  Discharge tentative date is 6-27.  Is currently contact-guard assist with mobility with a walker.  1 of the barriers was steps and is doing well tried 21 steps today    Total of 25 min spent in this encounter more than 50% in counseling and coordination.   Sinhdu Uriostegui MD, A

## 2019-06-21 NOTE — PROGRESS NOTES
Pt attended Falls Prevention class today with group of  3  patients.  Pt selected for class due to documented gait deficit and falls risk.  Class includes education in falls risks, how to decrease that risk through behavior and home modifications and energy conservation; and instruction in available equipment designed to increase home safety. Pt was able to verbalize understanding of materials and participated appropriately in the discussion and problem-solving segments of the class.  Pt given handout for falls prevention.

## 2019-06-21 NOTE — PLAN OF CARE
PT: pt making excellent gain in therapy today completing entire session on RA with VSS throughout, O2 >92% and HR 70s-90s with activity and no complaints of SOB. He navigates x16 stairs with single rail SBA and ambulates without AD x500' CGA progressed to SBA. Okay for in room with no AD and SBA with nursing, will assess for IND in room tomorrow. Pt also completes FGA balance assessment (see previous note) scoring 27/30 without AD, indicating reduced falls risk. If pt continues with good progress, may be able to discharge prior to anticipated 6/27 date. Pt also voices he will now be discharging to wife's home that has less stairs.

## 2019-06-21 NOTE — PLAN OF CARE
Problem: Goal/Outcome  Goal: Goal Outcome Summary  Outcome: Improving  Note:   FOCUS/GOAL  Medical management and Mobility    ASSESSMENT, INTERVENTIONS AND CONTINUING PLAN FOR GOAL:  Pt's vitals and BGs stable. No correction novolog given. Pt said that he is a new diabetic so Diabetes Nurse Educator Consult and Dietician Consult ordered for DM education. Pt denied any pain. Sternal incision, healing well and CONCETTA. Chest tube sites, covered with dressing CDI. Pt gets FINNEGAN. On 1 L/min of supplemental O2. Encouraged to do incentive spirometer and acapella.

## 2019-06-21 NOTE — PROGRESS NOTES
06/21/19 1600   Signing Clinician's Name / Credentials   Signing clinician's name / credentials Opal Kapoor DPT   Functional Gait Assessment (BRENDEN Villalobos, JAYLEN Cordon, et al. (2004))   1. GAIT LEVEL SURFACE 3   2. CHANGE IN GAIT SPEED 3   3. GAIT WITH HORIZONTAL HEAD TURNS 3   4. GAIT WITH VERTICAL HEAD TURNS 3   5. GAIT AND PIVOT TURN 3   6. STEP OVER OBSTACLE 2   7. GAIT WITH NARROW BASE OF SUPPORT 2   8. GAIT WITH EYES CLOSED 3   9. AMBULATING BACKWARDS 3   10. STEPS 2   Total Functional Gait Assessment Score   TOTAL SCORE: (MAXIMUM SCORE 30) 27     Functional Gait Assessment (FGA): The FGA assesses postural stability during various walking tasks.   Gait assistive device used: none     Patient Score: 27/30  Scores of <22/30 have been correlated with predicting falls in community-dwelling older adults according to Griselda & Sen 2010.   Scores of <18/30 have been correlated with increased risk for falls in patients with Parkinsons Disease according to Stu Barriga, Chamorro et al 2014.  Minimal Detectable Change for patients with acute/chronic stroke = 4.2 according to Thinadine & Ritschel 2009  Minimal Detectable Change for patients with vestibular disorder = 8 according to Griselda & Sen 2010    Assessment (rationale for performing, application to patient s function & care plan): determine AD needs at discharge, falls risk  Minutes billed as physical performance test: 8

## 2019-06-21 NOTE — PLAN OF CARE
FOCUS/GOAL  Medication management and Medical management    ASSESSMENT, INTERVENTIONS AND CONTINUING PLAN FOR GOAL:  A&O, CGA w/ walker.  Makes needs known, no alarms.  Pt denied pain.  Continent of B/B, LBM 6/20.  On 1L O2 w/ nasal cannula.

## 2019-06-21 NOTE — PROGRESS NOTES
"   06/21/19 0958   Signing Clinician's Name / Credentials   Signing clinician's name / credentials Angelique MathewsCCSLP   Quick Adds   Rehab Discipline SLP   Additional Documentation   SLP Plan see for 60 - follow up at meal, pt states he is coughing \"30%\" of time, did not cough liquids by straw during session but only took few sips.  ?voice tx - noting voice is variable, sometimes better when \"not focused on it\", also slight improvement with slight pressure to larynx with hand, continue with WJ-R pt did only 6thpercentile auditory visual learning, add reasoning goal PRN, likely needs ENT consult after d/c ?nerve damage and/or v.c injury ?if needing VFSS if ongoing cough with thin   Total Session Time   Total Session Time (minutes) 45 minutes  (20 cognitive 15 voice, 10 swallowing)   SLP - Acute Rehab Center Time   Individual Time (minutes) - enter zero if not applicable - SLP 45   Group Time (minutes) - enter zero if not applicable  - SLP 0   Concurrent Time (minutes) - enter zero if not applicable  - SLP 0   Co-Treatment Time (minutes) - enter zero if not applicable  - SLP 0   ARC Total Session Time (minutes) - SLP 45   Comprehension (FIM)   Functional Performance Complete independence comprehending complex/abstract ideas   Expression (FIM)   Functional Performance Requires extra time;Requires repetition to get point across (see comments)   Expression Comment SLP: voice assessment, limited ability sustain phonation, voicing was variable from aphonia to moderate dysphonia, but with audible voice at times. Slight improved with manual pressure sides larynx (briefly tried).  also worked on increasing breath, but did not improve phonation   Problem Solving (FIM)   Problem Solving Comment appears functionall WNL, did not formally assess at high level   Memory (FIM)   Functional Performance Minimal prompting-recognizes and remembers 75-90% of the time   Memory Comment SLP: WJ-R test of auditory visual learning 6th " "percentile   Eating (FIM)   Describe performance pt stating he coughs \"30% of time on liquids\". took couple sips with straw, no outward sx aspiraiton, but need see at meal to further evaluate     "

## 2019-06-22 LAB — GLUCOSE BLDC GLUCOMTR-MCNC: 101 MG/DL (ref 70–99)

## 2019-06-22 PROCEDURE — 97110 THERAPEUTIC EXERCISES: CPT | Mod: GP

## 2019-06-22 PROCEDURE — 97116 GAIT TRAINING THERAPY: CPT | Mod: GP

## 2019-06-22 PROCEDURE — 97535 SELF CARE MNGMENT TRAINING: CPT | Mod: GO

## 2019-06-22 PROCEDURE — 92526 ORAL FUNCTION THERAPY: CPT | Mod: GN | Performed by: SPEECH-LANGUAGE PATHOLOGIST

## 2019-06-22 PROCEDURE — 12800006 ZZH R&B REHAB

## 2019-06-22 PROCEDURE — 25000132 ZZH RX MED GY IP 250 OP 250 PS 637: Performed by: PHYSICAL MEDICINE & REHABILITATION

## 2019-06-22 PROCEDURE — 97530 THERAPEUTIC ACTIVITIES: CPT | Mod: GP

## 2019-06-22 PROCEDURE — 00000146 ZZHCL STATISTIC GLUCOSE BY METER IP

## 2019-06-22 PROCEDURE — 97530 THERAPEUTIC ACTIVITIES: CPT | Mod: GO

## 2019-06-22 RX ADMIN — ATORVASTATIN CALCIUM 40 MG: 40 TABLET, FILM COATED ORAL at 21:09

## 2019-06-22 RX ADMIN — METOPROLOL TARTRATE 100 MG: 50 TABLET, FILM COATED ORAL at 09:11

## 2019-06-22 RX ADMIN — POTASSIUM CHLORIDE 20 MEQ: 10 TABLET, EXTENDED RELEASE ORAL at 21:09

## 2019-06-22 RX ADMIN — TAMSULOSIN HYDROCHLORIDE 0.4 MG: 0.4 CAPSULE ORAL at 09:11

## 2019-06-22 RX ADMIN — ASPIRIN 81 MG CHEWABLE TABLET 81 MG: 81 TABLET CHEWABLE at 09:11

## 2019-06-22 RX ADMIN — AMLODIPINE BESYLATE 10 MG: 10 TABLET ORAL at 21:09

## 2019-06-22 RX ADMIN — MULTIPLE VITAMINS W/ MINERALS TAB 1 TABLET: TAB at 09:11

## 2019-06-22 RX ADMIN — ACETAMINOPHEN 650 MG: 325 TABLET, FILM COATED ORAL at 09:15

## 2019-06-22 RX ADMIN — ALLOPURINOL 100 MG: 100 TABLET ORAL at 09:11

## 2019-06-22 RX ADMIN — FUROSEMIDE 40 MG: 20 TABLET ORAL at 09:11

## 2019-06-22 RX ADMIN — POTASSIUM CHLORIDE 20 MEQ: 10 TABLET, EXTENDED RELEASE ORAL at 09:11

## 2019-06-22 RX ADMIN — BENZOCAINE, MENTHOL 2 LOZENGE: 15; 3.6 LOZENGE ORAL at 21:09

## 2019-06-22 RX ADMIN — METOPROLOL TARTRATE 100 MG: 50 TABLET, FILM COATED ORAL at 21:09

## 2019-06-22 RX ADMIN — BENZOCAINE, MENTHOL 2 LOZENGE: 15; 3.6 LOZENGE ORAL at 09:15

## 2019-06-22 RX ADMIN — ACETAMINOPHEN 650 MG: 325 TABLET, FILM COATED ORAL at 13:18

## 2019-06-22 ASSESSMENT — MIFFLIN-ST. JEOR: SCORE: 1993.9

## 2019-06-22 NOTE — PLAN OF CARE
Slept off and on throughout the night. A&O x4. Reported pain at sternal incision, managed with tylenol x1. SBA with gait belt. On 1L of oxygen via NC. Continent of bladder in toilet. Continue with POC.

## 2019-06-22 NOTE — PLAN OF CARE
SLP: Pt seen at meal on current regular diet with tin liquids by straw. Pt reports that he still coughs intermittently throughout the day- sometimes is with thin liquids intake but reports cough even without any po intake. During meal today- no aspiration s/s with thin liquids by straw- also cued pt to trial a chin tuck strategy to see if any chagne- he rported that it felt like it helped some. Pt did have a mild cough and throat clear 1 time with regualr solids but did not report any sensation of pharyngeal retention. Recommend ocntinue with current regular diet and thin liquids, straw is ok, pt should continue to be upright for all po, small bites/sips,, alternate solids and liquids, trial chin tuck with liquids. SLP to follow up again at a meal. If coughing per pt report continues - may need a VFSS to further assess- will see how he does over next day or 2. Plan to continue with voice and cognition tx as well

## 2019-06-22 NOTE — PLAN OF CARE
Patient denies pain. Using call light appropriately to make needs known. On 1L of oxygen via NC, oxygen saturation at 97%.  Blood glucose this shift was 92 prior to dinner and 102 at bedtime, no correction needed. Continent of bladder in toilet, no BM this shift. SBA with gait belt. Bed in low position, alarms, and call light within patient reach. Continue with POC.

## 2019-06-22 NOTE — PLAN OF CARE
OT   Current status: Pt completing toileting and grooming independently. Pt reports not needing any A for dressing. Completed light meal prep using stovetop with SBA. Verbal cues provided for Energy conservation and work simplification technique and sternal precautions during kitchen activity.   Barriers/Recommendation: continues to have limited functional activity tolerance. Needs to work on light home management tasks. Pt may benefit from further cooking activity. Pt cooks most meals for the family at [facility]

## 2019-06-22 NOTE — PROGRESS NOTES
"CLINICAL NUTRITION SERVICES - BRIEF NOTE    Provider Order - Nutrition Education consult received for \"Pt is a new diabetic. He is interested to know about diabetic diet.\"    Patient was seen by RD for full nutrition assessment on 6/19 (see note for details). PO intake has been reduced, however pt reports today that he is eating okay. He is drinking approximately 1 Boost GC/ day. He is interested in learning more about diabetes diet guidelines.    Interventions  1. Reviewed basics of consistent carb diet. Reviewed carbohydrate containing foods and recommended carb intake for meals and snacks. Handouts provided include: \"Carbohydrate Counting\" and \"Heart-Healthy Consistent Carbohydrate Nutrition Therapy\".  2. Reduced Boost GC from 2x/ day to 1x/ day.  3. Diet order modified to \"Low Saturated Fat <2400 mg Na\" (previously ordered incorrectly).    Follow Up/ Monitoring  RD will continue to follow per plan of care.        Opal Matthews, MS, RD, LD     "

## 2019-06-22 NOTE — PLAN OF CARE
Current status: ambulating and transferring with Mod I, benefits today from cues for sternal precautions with bed mobility from a flat bed and occasional sit to stand.  Vitals stable post 7 stairs 99%-96%, and HR 74 to 85 pre and post, all on RA.  Subjectively limited by coughing and apprehension of coughing.  Progressed breathing education to attempt to help with this as well.      Barriers: post surgical status; functional tolerance, sternal precautions, voice changes/coughing.      Recommendations: PT: gait, stairs, review bed mobility from a flat bed; standing exs and dynamic balance with sternal precautions and monitor vitals, on RA as tolerated.     Ramone Bingham, PT  6/22/2019

## 2019-06-22 NOTE — PROGRESS NOTES
[facility] Acute Rehabilitation Unit Progress Note    Patient Name: Yadiel Roth   YOB: 1970  MRN: [id number]    Age / Sex: 49 year old male    ASSESSMENT/PLAN:  Yadiel Roth is a 49 year old male in acute rehab for Cardiac: s/p repair of ascending aorta aneurysm and aortic arch. Admitted on 6/17/2019.  Patient progressing through therapies, with current sustaining cares meeting needs. See primary team note for details.  - EMR reviewed, no acute overnight events, vitals stable   - labs, imaging none new  - continue multidisciplinary therapies and plan of care.  - DMT newly diagnosed.2: Patient on insulin sliding scale, however, has not been receiving.  Blood glucoses ranging from 80s to 120s.  Intention was to start metformin, but will not be done with these blood glucose levels.  Patient will need follow-up with PCP.  Insulin and glucose checks discontinued on 6/22.  -Patient made independent in room.    SUBJECTIVE/INTERVAL HX:    Patient was seen and examined at bedside rounds. Reports feeling well. Slept well. Denies shortness of breath.  Patient was on O2 via nasal cannula prior to therapies this morning.  However, in therapies was not requiring whatsoever.  Patient was encouraged that lung sounds are good, he does not need oxygen.  Continue encouragement.  Patient denies pain.  Patient denies further episodes of bleeding per rectum.    PHYSICAL EXAM:  Vitals: Temp: 98.2  F (36.8  C) Temp src: Oral BP: 119/48 Pulse: 80 Heart Rate: 70 Resp: 18 SpO2: 97 % O2 Device: Nasal cannula Oxygen Delivery: 1 LPM  Gen: No acute distress, pleasant  HEENT: hearing present to speaking voice  CVS: RRR, no extremity edema noted  Resp: CTAB, breathing unlabored at rest on room air  Abd: nondistended, soft, BS +  MSK: moving all limbs spontaneously, participating in dynamic balance side stepping activity and physical therapy with contact-guard assist / SBA  Neuro: AOx3,  communicative, ANGE  Psych: nl affect    Pt discussed with Dr. Schmitt.     Melina Escobar  PGY-2 PM&R Resident  [facility] Acute Rehab  Pager: [phone]    I, Dr. Schmitt, have seen and examined the patient. I have reviewed the above resident's note and agree with content.  Total time: >15 min    DAVID Schmitt MD PhD

## 2019-06-22 NOTE — PLAN OF CARE
PT: pt engaged in dynamic gait without assistive device involving head turns and stepping over various objects without LOB. Pt went up/down 2 full flights of stairs in stairwell with one rail and SBA. Pt demonstrated safe ability to move around room without assistive device independently, updated white board to reflect this. Pt was on room air for entire session and SATs stayed above 94%.

## 2019-06-22 NOTE — PLAN OF CARE
Patient is not Mod I in room with a walker.  Continues on 1 liter O2 per NC, O2 sat 97%. Attempting to wean during therapy.    Tylenol given x 2 this shift for incisional discomfort with relief.

## 2019-06-23 PROCEDURE — 97127 ZZHC SP THERAPEUTIC INTERVENTION W/FOCUS ON COGNITIVE FUNCTION,EA 15 MIN: CPT | Mod: GN | Performed by: SPEECH-LANGUAGE PATHOLOGIST

## 2019-06-23 PROCEDURE — 25000132 ZZH RX MED GY IP 250 OP 250 PS 637: Performed by: PHYSICAL MEDICINE & REHABILITATION

## 2019-06-23 PROCEDURE — 97530 THERAPEUTIC ACTIVITIES: CPT | Mod: GO

## 2019-06-23 PROCEDURE — 92526 ORAL FUNCTION THERAPY: CPT | Mod: GN | Performed by: SPEECH-LANGUAGE PATHOLOGIST

## 2019-06-23 PROCEDURE — 12800006 ZZH R&B REHAB

## 2019-06-23 PROCEDURE — 97110 THERAPEUTIC EXERCISES: CPT | Mod: GP

## 2019-06-23 PROCEDURE — 97530 THERAPEUTIC ACTIVITIES: CPT | Mod: GO | Performed by: OCCUPATIONAL THERAPIST

## 2019-06-23 RX ADMIN — ATORVASTATIN CALCIUM 40 MG: 40 TABLET, FILM COATED ORAL at 21:22

## 2019-06-23 RX ADMIN — BENZOCAINE, MENTHOL 2 LOZENGE: 15; 3.6 LOZENGE ORAL at 02:42

## 2019-06-23 RX ADMIN — TAMSULOSIN HYDROCHLORIDE 0.4 MG: 0.4 CAPSULE ORAL at 08:32

## 2019-06-23 RX ADMIN — METOPROLOL TARTRATE 100 MG: 50 TABLET, FILM COATED ORAL at 08:31

## 2019-06-23 RX ADMIN — POTASSIUM CHLORIDE 20 MEQ: 10 TABLET, EXTENDED RELEASE ORAL at 08:32

## 2019-06-23 RX ADMIN — FUROSEMIDE 40 MG: 20 TABLET ORAL at 08:31

## 2019-06-23 RX ADMIN — ACETAMINOPHEN 650 MG: 325 TABLET, FILM COATED ORAL at 12:43

## 2019-06-23 RX ADMIN — BENZOCAINE, MENTHOL 2 LOZENGE: 15; 3.6 LOZENGE ORAL at 21:23

## 2019-06-23 RX ADMIN — BENZOCAINE, MENTHOL 2 LOZENGE: 15; 3.6 LOZENGE ORAL at 19:35

## 2019-06-23 RX ADMIN — ALLOPURINOL 100 MG: 100 TABLET ORAL at 08:32

## 2019-06-23 RX ADMIN — MULTIPLE VITAMINS W/ MINERALS TAB 1 TABLET: TAB at 08:32

## 2019-06-23 RX ADMIN — POTASSIUM CHLORIDE 20 MEQ: 10 TABLET, EXTENDED RELEASE ORAL at 21:22

## 2019-06-23 RX ADMIN — AMLODIPINE BESYLATE 10 MG: 10 TABLET ORAL at 21:22

## 2019-06-23 RX ADMIN — ACETAMINOPHEN 650 MG: 325 TABLET, FILM COATED ORAL at 08:31

## 2019-06-23 RX ADMIN — ASPIRIN 81 MG CHEWABLE TABLET 81 MG: 81 TABLET CHEWABLE at 08:32

## 2019-06-23 RX ADMIN — METOPROLOL TARTRATE 100 MG: 50 TABLET, FILM COATED ORAL at 21:22

## 2019-06-23 NOTE — PLAN OF CARE
"PT: At start of session after ambulation from pt's room > gym pt reported have chest tightness, lasting for ~3 minutes, pt reported it \"loosening up\" during those few minutes before fully resolved, vitals monitored and WNL, no other symptoms. RN notified and aware. Cont with POC.   "

## 2019-06-23 NOTE — PROGRESS NOTES
06/23/19 1707   Signing Clinician's Name / Credentials   Signing clinician's name / credentials Lindsey Blair Ms/CCC-SLP   Quick Adds   Rehab Discipline SLP   Additional Documentation   SLP Plan SLP: Did not add a reasoning goa as pts scre inthe borad range of average and feels wh would have scored the same as before. Memory and swallowing- see prior SLP plan note on swallowing. Voice- Dr Schmitt has put in for an ENT consut- so hopefully will happen soon   Total Session Time   Total Session Time (minutes) 23 minutes   ARC or TCU Only   What unit is patient on? Acute Rehab   SLP - Acute Rehab Center Time   Individual Time (minutes) - enter zero if not applicable - SLP 23  (23 cognitive skills)   Group Time (minutes) - enter zero if not applicable  - SLP 0   Concurrent Time (minutes) - enter zero if not applicable  - SLP 0   Co-Treatment Time (minutes) - enter zero if not applicable  - SLP 0   ARC Total Session Time (minutes) - SLP 23   Problem Solving (FIM)   Functional Performance Solves complex problems independently   Problem Solving Comment Compelted WJ-R Verbal analogies- Pt scored 47th percentile ( raw score of 21)- per pt he states he would have scored about the laron on this as befor the hospitalization   FIM Score 7- Complete independence

## 2019-06-23 NOTE — PLAN OF CARE
SLP: Pt seen for a meal again- on regular diet with thin . Pt noted to very carefully cut his food into much smller pieces than yesterday-- pt reorting that this is helping. 1 time pt had a cough and throat clear with rice- said it did feel like some was sticking in pharynx but when f/u with sip of liquids then no sensation of  pharygneal retention. Pt had another cough during meal but it was delayed and did not appear to be related to aspiration. There were no s/s of aspiration with thin liquids by straw but pt does state that on occasional he will still have this. As noted yesterday- pt has a cough throughout the day even without po intake so difficulty to attibute or r/o cough as aspiration related. Recommending continue with regula rdiet with thin liquids- SLP to follow up again tomorrow for diet tolerance -- can consider VFSS is needed if coughing continues; further have reocmmended that an ENT consult be put in as dysphonia is persistent- ? of vocal cord damage vs nerve damage. Pt t continue using strategies of small bites/sips, alternate solids and liquids, slow rate, upright for all po,

## 2019-06-23 NOTE — PROGRESS NOTES
FOCUS/GOAL  Bowel management, Bladder management and Mobility    ASSESSMENT, INTERVENTIONS AND CONTINUING PLAN FOR GOAL:  Patient is alert and oriented and is able to make needs known by using call light and verbalizing needs.  Patient is independently positioning in bed, using pillow to support torso when lying on side.  Denies pain.  Continuous o2 via nasal cannula.  Independent in room.  Continent of bowel and bladder per patient report.  PRN Lozenges given x 1.

## 2019-06-23 NOTE — PLAN OF CARE
"Patient continues to be independent in the room. Tolerating without difficulty. Continent of bladder and bowel on the toilet. LBM today.  Tylenol given this am for pain prevention during therapy.  During PT patient complained of chest \"tightness\" after ambulating to the gym.  Lasted about 3 minutes per pt.  VSS, denied SOB.  Dr. Escobar notified and pt notified to alert staff if it occurs again.  Continue to monitor.    Has been on RA throughout the day, O2 sats in upper 90's. States he uses the O2 at 1 liter if he has \"a coughing spell.\"  Uses cepacol lozenges to help when coughing.      "

## 2019-06-23 NOTE — PLAN OF CARE
OT : pt reports being I in room is going okay without any concerns or difficulty. Tolerated functional standing activity for up to 11 minutes today. Continues to require skilled OT service for light home management tasks and  meal prep activity

## 2019-06-23 NOTE — PROGRESS NOTES
[facility] Acute Rehabilitation Unit Progress Note    Patient Name: Yadiel Roth   YOB: 1970  MRN: [id number]    Age / Sex: 49 year old male    ASSESSMENT/PLAN:  Yadiel Roth is a 49 year old male in acute rehab for Cardiac: s/p repair of ascending aorta aneurysm and aortic arch. Admitted on 6/17/2019.  Patient progressing through therapies, with current sustaining cares meeting needs. See primary team note for details.  - EMR reviewed, no acute overnight events, vitals stable   - labs, imaging none new  - continue multidisciplinary therapies and plan of care.  - chest tightness: single episode during PT this morning, lasting ~1-3 min. No other symptoms and vitals stable. Will monitor for now, patient knows to inform staff if it reoccurs. If persists, would get ECG, troponin, ?imaging.  -Patient made independent in room yesterday    SUBJECTIVE/INTERVAL HX:    Patient was seen and examined at bedside rounds. Reports feeling well. Therapies going well. Had a single episode of chest tightness this morning during PT. Patient says lasting only ~1min, staff reporting ~3min. This is the first episode like this. Denied feeling short of breath as well as not other symptoms. Vitals stable. Patient says the pain was mostly located in the middle of his chest, around his incision. Discussed continuing to monitor and that patient inform staff if reoccurs.     PHYSICAL EXAM:  Vitals: Temp: 96.6  F (35.9  C) Temp src: Oral BP: 114/66 Pulse: 81 Heart Rate: 85 Resp: 18 SpO2: 96 % O2 Device: None (Room air) Oxygen Delivery: 1 LPM  Gen: No acute distress, pleasant, sitting up in bed playing game on ipad  HEENT: hearing present to speaking voice  CVS: RRR, no murmurs  Resp: CTAB, breathing unlabored at rest on room air  Abd: nondistended, soft, BS +  MSK: moving all limbs spontaneously, independent in room  Neuro: AOx3, communicative, ANGE  Psych: nl affect    Pt discussed with  Dr. Schmitt.     Melina Escobar  PGY-2 PM&R Resident  [facility] Acute Rehab  Pager: [phone]

## 2019-06-23 NOTE — PLAN OF CARE
Patient alert and oriented. VSS. Continues to bed on 1L of oxygen via NC. Denies pain/SOB/numbness/tingling. Continent in toilet. LBM 6/20. Independent in room with no assistive device. Bed in low position and call light within patient reach.

## 2019-06-24 ENCOUNTER — RECORDS - HEALTHEAST (OUTPATIENT)
Dept: ADMINISTRATIVE | Facility: OTHER | Age: 49
End: 2019-06-24

## 2019-06-24 PROCEDURE — 25000132 ZZH RX MED GY IP 250 OP 250 PS 637: Performed by: PHYSICAL MEDICINE & REHABILITATION

## 2019-06-24 PROCEDURE — 25000132 ZZH RX MED GY IP 250 OP 250 PS 637: Performed by: STUDENT IN AN ORGANIZED HEALTH CARE EDUCATION/TRAINING PROGRAM

## 2019-06-24 PROCEDURE — 40000275 ZZH STATISTIC RCP TIME EA 10 MIN

## 2019-06-24 PROCEDURE — 97110 THERAPEUTIC EXERCISES: CPT | Mod: GO | Performed by: STUDENT IN AN ORGANIZED HEALTH CARE EDUCATION/TRAINING PROGRAM

## 2019-06-24 PROCEDURE — 97110 THERAPEUTIC EXERCISES: CPT | Mod: GP

## 2019-06-24 PROCEDURE — 94640 AIRWAY INHALATION TREATMENT: CPT | Mod: 76

## 2019-06-24 PROCEDURE — 12800006 ZZH R&B REHAB

## 2019-06-24 PROCEDURE — 97116 GAIT TRAINING THERAPY: CPT | Mod: GP

## 2019-06-24 PROCEDURE — 92526 ORAL FUNCTION THERAPY: CPT | Mod: GN

## 2019-06-24 PROCEDURE — 94640 AIRWAY INHALATION TREATMENT: CPT

## 2019-06-24 PROCEDURE — 97535 SELF CARE MNGMENT TRAINING: CPT | Mod: GO | Performed by: STUDENT IN AN ORGANIZED HEALTH CARE EDUCATION/TRAINING PROGRAM

## 2019-06-24 PROCEDURE — 25000125 ZZHC RX 250: Performed by: PHYSICAL MEDICINE & REHABILITATION

## 2019-06-24 RX ORDER — POTASSIUM CHLORIDE 1500 MG/1
20 TABLET, EXTENDED RELEASE ORAL 2 TIMES DAILY
Status: DISCONTINUED | OUTPATIENT
Start: 2019-06-24 | End: 2019-06-27 | Stop reason: HOSPADM

## 2019-06-24 RX ORDER — DEXTROMETHORPHAN POLISTIREX 30 MG/5ML
30 SUSPENSION ORAL EVERY 12 HOURS PRN
Status: DISCONTINUED | OUTPATIENT
Start: 2019-06-24 | End: 2019-06-27 | Stop reason: HOSPADM

## 2019-06-24 RX ADMIN — METOPROLOL TARTRATE 100 MG: 50 TABLET, FILM COATED ORAL at 21:55

## 2019-06-24 RX ADMIN — ASPIRIN 81 MG CHEWABLE TABLET 81 MG: 81 TABLET CHEWABLE at 08:04

## 2019-06-24 RX ADMIN — IPRATROPIUM BROMIDE AND ALBUTEROL SULFATE 3 ML: .5; 3 SOLUTION RESPIRATORY (INHALATION) at 07:50

## 2019-06-24 RX ADMIN — POTASSIUM CHLORIDE 20 MEQ: 20 TABLET, EXTENDED RELEASE ORAL at 08:10

## 2019-06-24 RX ADMIN — BENZOCAINE, MENTHOL 2 LOZENGE: 15; 3.6 LOZENGE ORAL at 22:03

## 2019-06-24 RX ADMIN — FUROSEMIDE 40 MG: 20 TABLET ORAL at 08:04

## 2019-06-24 RX ADMIN — ACETAMINOPHEN 650 MG: 325 TABLET, FILM COATED ORAL at 08:04

## 2019-06-24 RX ADMIN — BENZOCAINE, MENTHOL 2 LOZENGE: 15; 3.6 LOZENGE ORAL at 00:43

## 2019-06-24 RX ADMIN — POTASSIUM CHLORIDE 20 MEQ: 20 TABLET, EXTENDED RELEASE ORAL at 21:55

## 2019-06-24 RX ADMIN — BENZOCAINE, MENTHOL 2 LOZENGE: 15; 3.6 LOZENGE ORAL at 18:45

## 2019-06-24 RX ADMIN — DEXTROMETHORPHAN 30 MG: 30 SUSPENSION, EXTENDED RELEASE ORAL at 13:51

## 2019-06-24 RX ADMIN — ALLOPURINOL 100 MG: 100 TABLET ORAL at 08:04

## 2019-06-24 RX ADMIN — METOPROLOL TARTRATE 100 MG: 50 TABLET, FILM COATED ORAL at 08:04

## 2019-06-24 RX ADMIN — BENZOCAINE, MENTHOL 2 LOZENGE: 15; 3.6 LOZENGE ORAL at 05:49

## 2019-06-24 RX ADMIN — AMLODIPINE BESYLATE 10 MG: 10 TABLET ORAL at 21:56

## 2019-06-24 RX ADMIN — TAMSULOSIN HYDROCHLORIDE 0.4 MG: 0.4 CAPSULE ORAL at 08:04

## 2019-06-24 RX ADMIN — ATORVASTATIN CALCIUM 40 MG: 40 TABLET, FILM COATED ORAL at 21:56

## 2019-06-24 RX ADMIN — BENZOCAINE, MENTHOL 2 LOZENGE: 15; 3.6 LOZENGE ORAL at 08:05

## 2019-06-24 RX ADMIN — MULTIPLE VITAMINS W/ MINERALS TAB 1 TABLET: TAB at 08:04

## 2019-06-24 RX ADMIN — IPRATROPIUM BROMIDE AND ALBUTEROL SULFATE 3 ML: .5; 3 SOLUTION RESPIRATORY (INHALATION) at 17:58

## 2019-06-24 ASSESSMENT — MIFFLIN-ST. JEOR: SCORE: 1978.02

## 2019-06-24 NOTE — CONSULTS
Health Psychology                  Clinic    Department of Medicine  Lori Aaron, Ph.D., L.P. [phone]                          Clinics and Surgery Center  [facility] Adela Grewal, Ph.D.,  L.P. [phone]                 3rd Floor  [place] Mail [address]   Vanda Fong, Ph.D., L.P. [phone]    [address] Carmen Pena, Ph.D., L.P. [phone]            [place] [address]          Joe Pena, Ph.D., NHAN.AUTUMN.ANIL., L.P. [phone]      Lindsey Saunders, Ph.D., L.P. [phone]      Inpatient Health Psychology Consultation*      DOS:  06/24/2019      REFERRAL SOURCE:  Sindhu Uriostegui MD, Acute Rehabilitation Center, [facility]      REASON FOR REFERRAL:  Yadiel Roth is a 49-year-old man currently on the Acute Rehabilitation Center following scheduled surgery to repair an ascending aortic aneurysm on 06/10/2019 at [facility] in [place].  Mr. Roth is on the ARC to focus on regaining his baseline level of strength, balance and endurance, which have been affected by his hospitalization, surgery and recovery.  He is experiencing some difficulty with his voice related to the impact of intubation.  Mr. Roth has reported some anxiety reactions over the past weeks since admission to the [facility].  This evaluation was requested to assess his current emotional status and to make treatment recommendations.      HISTORY OF PRESENT ILLNESS:  Mr. Roth reports that his baseline mood is typically relaxed and easygoing, stating that he has no history of depression, anxiety or other emotional issues that have significantly affected his functioning.  Mr. Roth began experiencing some episodes of anxiety here on [facility] when having a sensation of choking, as if he had something to cough up but was not able to.  He has found some benefit in using hydroxyzine that is  available on a p.r.n. basis.  Mr. Roth tells me today that he is also focused on remaining as relaxed as possible during these episodes, with an awareness that muscle tension will likely make the sensation feel more difficult.  He notes that since these episodes began occurring, they have become less frequent.  He denies anxiety about any other issues.  He does talk about his goal of reuniting with his wife, from whom he has been living separately for several years.  He tells me that he and his wife have been able to parent effectively during the time that they have lived separately and that both of them have had the goal of putting the needs of the children first.  He believes that they are in sync with their desire to make the marriage work for the sake of being the best parents that they can be to their 9 and 10-year-old sons.      Mr. Roth was appreciative of this contact with Health Psychology and the ability to focus on his emotional well-being.  At the same time, he does not feel a strong need for additional emotional support in an ongoing way.      SOCIAL HISTORY:  Mr. Roth grew up in [place].  He was very close with his mother and less so with his father.  He has 1 brother with whom he has no contact currently and describes some distress within their relationship related to the fact that Mr. Roth was a caregiver for both of their parents as well as their grandparents with no assistance from his brother.  Mr. 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  He tells me that he was recognized as the most successful salesman many times.  He and his wife, Dia, met online 19 years ago and he moved to the [facility] to be with her.  This was a difficult move and he tells me that he left a life that felt very positive and that was going very well for him in order to be with her.  They  have been  for 16 years and have sons who are 9 and 10. He has been working at [employer] since in the [facility], doing a 2 pm-midnight shift. They have been living separately for the past several years, and he is very focused on his desire to work toward reuniting the family; he did not talk about their reasons for .  Mr. Roth grew up in the [employer], has not been active in his kevyn, but is aware that he carries his own kevyn and belief sin a way that allows them to provide him with strength and comfort.      MEDICAL HISTORY:  Please see Mr. Roth's [facility] electronic medical record for more complete information on his medical history, current admission and status and all medications.      PSYCHIATRIC HISTORY:  As above in HPI.  Mr. Roth reports no history of depression, anxiety or other emotional issues that have affected his functioning significantly.  No additional psychiatric history was available at the time of this evaluation.      BEHAVIORAL IMPRESSIONS:  Mr. Roth presented for this evaluation resting in his bed on the ARC between scheduled rehabilitation therapies.  He tells me that he tries to nap whenever possible, noting that he worked odd hours in his job at [employer] over the past 6-8 years and has learned to nap when he is able to during the day.  He reported his mood as generally positive and exhibited a completely euthymic affect.  Speech was very soft and hoarse but easy to hear and comprehend.  Content of speech appeared to be clear, coherent and goal oriented.  He answered every question very openly and easily as asked.  Insight and judgment appeared to be intact.  He exhibited no evidence of distortions of thought or perception.      IMPRESSIONS AND PLAN:  Yadiel Roth is a 49-year-old man currently on the Acute Rehabilitation Center following a planned repair of an ascending aortic aneurysm at [facility] on 06/10.  He has experienced  some episodes of anxiety in response to a choking sensation over the past few nights.  He notes that these episodes have been decreasing in frequency.  He has worked to keep himself as relaxed as possible during these episodes.  He does not report anxiety about any other specific issues.  At the same time, he talked at length about his hopes and goals for his marriage and the possibility of reuniting the family when he is discharged and able to focus more on those relationships.  He notes that marital counseling has been helpful to some extent and hopes that they will be able to find a counselor who is the right fit.      Mr. Roth would appreciate being served communion by the [facility]  and I will put in that request.  I will also maintain contact and provide possible options for marital counseling should he feel that would be helpful.      DIAGNOSIS:  Adjustment disorder with anxiety (F43.22).         ZAHEER MONTEJO, PHD, LP         *In accordance with the Rules of the [place] [employer], it is noted that psychological descriptions and scientific procedures underlying psychological evaluations have limitations.  Absolute predictions cannot be made based on information in this report.       D: 2019   T: 2019   MT: AUBREY      Name:     SUSI ROTH   MRN:      [id number]        Account:       [id number]   :      1970           Consult Date:  2019      Document: [id number]       cc: Sindhu Griffin MD

## 2019-06-24 NOTE — PLAN OF CARE
OT: Pt doing well, meeting OT goals, on track for discharge Thursday with OP CR and SLP at [facility].

## 2019-06-24 NOTE — PROGRESS NOTES
"  [facility]   Acute Rehabilitation Unit  Daily progress note    INTERVAL HISTORY  No acute events overnight. The patient did note some more chest tightness, but only occurring with coughing. He feels his coughing has not been improving. It is a dry cough, he feels he has a tickle in his throat. Cough drops help at times. Discussed adding additional PRN cough meds. Patient had NC on when provider entered room, but he quickly took it off. He denied SOB, but said he felt like wearing it as a \"precaution.\" Otherwise, patient is doing well. Ind in room, moving well.     ENT consult was ordered on Sunday. Still awaiting for consultation.     MEDICATIONS  Scheduled:    allopurinol  100 mg Oral Daily     amLODIPine  10 mg Oral QPM     aspirin  81 mg Oral Daily     atorvastatin  40 mg Oral At Bedtime     furosemide  40 mg Oral Daily     metoprolol tartrate  100 mg Oral BID     multivitamin w/minerals  1 tablet Oral Daily     potassium chloride ER  20 mEq Oral BID     tamsulosin  0.4 mg Oral Daily        PRN:  acetaminophen, sore throat lozenge, bisacodyl, hydrOXYzine, ipratropium - albuterol 0.5 mg/2.5 mg/3 mL, ondansetron, polyethylene glycol, senna-docusate       PHYSICAL EXAM  Patient Vitals for the past 24 hrs:   BP Temp Temp src Pulse Heart Rate Resp SpO2 Weight   06/24/19 0803 107/52 98.3  F (36.8  C) Oral 90 -- 18 96 % --   06/24/19 0549 113/51 96.5  F (35.8  C) Oral -- 76 16 95 % 112.8 kg (248 lb 9.6 oz)   06/23/19 2122 120/60 -- -- -- 84 -- 97 % --   06/23/19 1732 124/54 98.7  F (37.1  C) Oral 81 -- 16 98 % --       GEN: NAD, pleasant and cooperative, sitting up in bed   HEENT: NC/AT, nasal cannula in place   CVS: RRR, no murmurs  PULM: CTAB, good breath sounds, no increased WOB, dry cough   ABD: Soft, ND, bowel sounds present  EXT: moves all extremities spontaneously  Neuro: Answers appropriately, follows commands, greater than antigravity strength throughout bilateral upper " extremities and bilateral lower extremities, ind in room with mobility     LABS  CBC RESULTS:   Recent Labs   Lab Test 06/18/19  0622   WBC 11.9*   RBC 3.61*   HGB 10.7*   HCT 33.3*   MCV 92   MCH 29.6   MCHC 32.1   RDW 14.4          Last Comprehensive Metabolic Panel:  Sodium   Date Value Ref Range Status   06/21/2019 136 133 - 144 mmol/L Final     Potassium   Date Value Ref Range Status   06/21/2019 4.0 3.4 - 5.3 mmol/L Final     Chloride   Date Value Ref Range Status   06/21/2019 105 94 - 109 mmol/L Final     Carbon Dioxide   Date Value Ref Range Status   06/21/2019 26 20 - 32 mmol/L Final     Anion Gap   Date Value Ref Range Status   06/21/2019 5 3 - 14 mmol/L Final     Glucose   Date Value Ref Range Status   06/21/2019 104 (H) 70 - 99 mg/dL Final     Urea Nitrogen   Date Value Ref Range Status   06/21/2019 14 7 - 30 mg/dL Final     Creatinine   Date Value Ref Range Status   06/21/2019 0.93 0.66 - 1.25 mg/dL Final     GFR Estimate   Date Value Ref Range Status   06/21/2019 >90 >60 mL/min/[1.73_m2] Final     Comment:     Non  GFR Calc  Starting 12/18/2018, serum creatinine based estimated GFR (eGFR) will be   calculated using the Chronic Kidney Disease Epidemiology Collaboration   (CKD-EPI) equation.       Calcium   Date Value Ref Range Status   06/21/2019 8.4 (L) 8.5 - 10.1 mg/dL Final       Recent Labs   Lab 06/22/19  0729 06/21/19  2111 06/21/19  1718 06/21/19  1215 06/21/19  1040 06/21/19  0745 06/20/19  2105  06/18/19  0622   GLC  --   --   --   --  104*  --   --   --  99   * 102* 92 86  --  96 115*   < >  --     < > = values in this interval not displayed.     Lab Results   Component Value Date    A1C 8.9 06/18/2019         ASSESSMENT  Yadiel Roth is a 49 year old male with a PMH including but not limited to HTN, gout, LVH, carpal tunnel syndrome, prediabetes, morbid obesity, and an ascending aortic aneurysm who presented to [facility] on 6/10/19 for a planned  repair of ascending aortic aneurysm and aortic arch.  Hospital course complicated by post-operative pain, hypoxia requiring supplemental oxygen, and urinary retention requiring temporary Jenkins catheter.      Impairment group code: 09 Cardiac: s/p repair of ascending aorta aneurysm and aortic arch      PLAN  Rehabilitation  -Continue with inpatient rehabilitation including PT, OT, SLP, rehab nursing, social work, and close monitoring by physiatry  -The patient has impairments in strength, balance, and endurance, which are leading to activity limitations in ambulation, mobility, ADLs, and functional communication      Medical  1)CVS  -s/p repair of ascending aorta aneurysm and aortic arch               -ASA 81 mg daily               -Cardiac precautions - No lifting >10 lbs, minimal overhead reaching, no extreme arm movements                      -Wash incisions daily with soap and water               -Daily weights               -Lasix 40 mg daily and potassium supplementation 20 mEq BID for 2 weeks each (last dose 6/30).  Monitor kidney function and electrolytes (last Cr 0.93 on 6/21, recheck 6/26)               -CV surgery appointment 7/2/19 at 1:00 pm ([facility])               -Cardiology appointment 8/5? (will need to confirm) at [facility]               -Cardiac rehab after discharge  -HTN: Norvasc 10 mg every evening, Metoprolol 100 mg BID  -HLD: Lipitor 40 mg qHS       2)Pulmonary  -Pulmonary toilet - Continue EZPap QID with RT and duonebs q4h PRN  -Encourage incentive spirometry  -O2 PRN for comfort - encouraging wean as no home O2, patient has been weaning  -cepacol lozenge PRN for cough, added mucinez BID PRN on 6/24        3)FENGI  -Diet: Regular consistency solids, thin liquids.  Low salt and fat.  -Bowels: PRN Senokot-S, Miralax, Dulcolax suppository  -Zofran PRN  -MVT daily   -Monitor potassium levels.  Continue with 20 mEq BID supplementation while on Lasix (see above).  Potassium  "4.0 on 6/18 and 6/21, next recheck 6/26.        4)  -Post-operative urinary retention, now with Jenkins removed  -PVR x1 15 ml.   -Cr stable.  -Continue with Flomax 0.4 mg daily        5)DVT prophylaxis  -Mechanical only.  Ambulating adequate distances to deter DVTs so no chemoprophylaxis.        6)Pain  -Tylenol 325-650 mg q4h PRN       7)Endo  -Diabetes - HbA1c, 8.9 = newly diagnosed diabetes. Sliding scale insulin and diabetes education ordered. Intention was to likely start Metformin, however, blood glucose values have been in 80s-110s. Blood glucoses normal - only slightly elevated. Will not start metformin, sliding scale insulin discontinued, glucose checks discontinued.  - PCP f/u as outpatient       8)Heme  -Hgb 10.4 on 6/19, recheck 6/21 was 11.1. BRB per rectum noted 6/20 with BM x 2. No hx of hemorrhoids.  6/21 no recurrence hemoglobin stable/improved  -WBCs 12.9. Patient has not been febrile, but temperatures had been in the 99 range, improving to more normalcy 6/21.  Would initiate full infectious workup if spikes fever.       9)Psych: denies hx of anxiety or depression  -Atarax PRN for anxiety, sensation of \"choking\"  -Patient agreeable to rehab health psych consult - placed 6/20  -Could consider further meds if continued issues with anxiety and / or mood  -Alternative medicine services recommended (I.e. Acupuncture, essential oils, etc. )  - following        10)?Ulnar neuropathy: patient noted diminished sensation in digits 3-5 on the R on 6/20 that had been occurring for several days. Educated on positioning. Patient denied this as an issue previously. Some R 5th digit weakness. Monitor. Could consider EMG/NCS.      11)Social/Dispo  -Anticipate discharge home at a modified independent level for ambulation, mobility, and ADLs with the least restrictive assistive device  -ELOS: 6/27  -Rehab prognosis: Good  -Follow up appointments: PCP, CV surgery, cardiology     Code status: Full Code "      Melina Escobar, DO  PGY2 Department of Rehabilitation Medicine    I, Dr. Schmitt, have seen and examined the patient. I have reviewed the above resident's note and agree with content. Total time: 25 Minutes with more than 50% in care coordination and counseling patient regarding rehabilitation issues.     Jacek Schmitt MD

## 2019-06-24 NOTE — PLAN OF CARE
Problem: Goal/Outcome  Goal: Goal Outcome Summary  Outcome: Improving  Note:   FOCUS/GOAL  Medical management, Discharge planning and Mobility    ASSESSMENT, INTERVENTIONS AND CONTINUING PLAN FOR GOAL:  Pt's vitals stable. Prn tylenol for sternal incision pain with good effect. Sternal incision CONCETTA and healing well. Chest tube sites, covered wih dressing CDI. Pt independent in his room without assistive device. Now on room air but he said that sometimes, he still uses O2 prn when he gets really SOB. He got a prn neb at start of shift, which was really helpful. He still gets SOB with activity. He is using his incentive spirometer and acapella. Voice still hoarse and low. Prn cepazol lozenge given for throat irritation. He also has a new order for prn cough medicine that was ordered by PM&R resident Melina Escobar. Prn dextromethorphan given.  Continue to monitor.

## 2019-06-24 NOTE — PROGRESS NOTES
"   06/24/19 5368   Visit Information   Visit Made By Staff    Type of Visit Follow-up;Staff consultation/triage   Visited Patient   Interventions   Plan of Care Review   With patient/family/proxy;With interdisciplinary team   Basic Spiritual Interventions   Assessment of spiritual needs/resources;Reflective conversation   Ritual/Sacramental Encounter    (Arranging communion for Yadiel.)   Advanced Assessments/Interventions   Presenting Concerns/Issues   (Yadiel was having some trouble catching his breath, so called in his nurse.)   SPIRITUAL HEALTH SERVICES   Spiritual Assessment Progress Note   [facility] ([facility] 5R   REFERRAL SOURCE: Epic consult.   On this visit,  Assessed Yadiel's request for [employer] communion.   EXPERIENCE OF ILLNESS/HOSPITALIZATION: Yadiel explained that he'd had a heart aneurysm and had surgery.  He was gulping a bit for air while lying back in his bed.  This writer went and found his nurse to check up on him.    MEANING-MAKING: \"I'm too young to have had this happen.\"   SPIRITUALITY/VALUES/Jewish: [employer].  Moved up here from [place] twelve years ago, but has no [employer] affiliation.   COPING/SPIRITUAL PRACTICES: Communion (requested)   SUPPORT SYSTEMS: His wife Dia, his son Doug, 9 and his son Luke, 10.  He said that the boys have been impacted by his hospitalization and that they are a little scared.   PLAN: Will leave a message for Fr. Hare to please bring communion if he is able.    Rev. Sugey Monsalve  Staff   Spiritual Health Services  Pager: [phone]  Office: [phone]  * [facility] remains available 24/7 for emergent requests/referrals, either by having the switchboard page the on-call  or by entering an ASAP/STAT consult in Epic (this will also page the on-call ).*        "

## 2019-06-24 NOTE — PLAN OF CARE
Patient continues to be independent in room. Denies pain. VSS. Continent of bowel and bladder. LBM 6/23. Had a shower this evening, requiring only set up. Has been on RA majority of shift, wear NC at 1L at bedtime for comfort. Oxygen saturation at 97%. Bed in low position and call light within patient reach.

## 2019-06-24 NOTE — PLAN OF CARE
Patient presenting with intermittent coughing throughout the session both before and during p.o. intake.  None of the cough had the appearance of an aspiration type cough.  Patient did have one hard episode of coughing that occurred without any p.o. intake.  Patient coughing several times in a row and started to fall over onto his side.  Patient quickly recovered and also noted a improvement in his vocal quality immediately following this episode in which she was able to speak several sentences back to back with appropriate breathing patterns every few words.  Continue to recommend ENT consult and outpatient therapy at a voice clinic upon facility discharge.  ENT's ability to complete assessment during acute rehab stay would not be a barrier to facility discharge outpatient setting if needed.

## 2019-06-24 NOTE — CONSULTS
Diabetes Education  Received consult request to see this 49 year old male for diabetes education, for new diagnosis of type 2 diabetes.  His A1c on admission was 8.9%.  His glucoses were  from 6/18-6/22/19, then glucose checks were discontinued.  Per physical medicine, plan is to have patient follow-up on diabetes diagnosis with his PCP.  No education needed at this time.  Will sign off.  Carin Chand MS, APRN, CNS, CDE, CDTC  [phone]

## 2019-06-24 NOTE — PLAN OF CARE
Writer went to patient's room due to complaints of shortness of breath. 02 saturation 99% on 1L of 02 via nasal cannula, respiratory rate 16, and patient's lungs clear bilaterally throughout upon auscultation. Patient stated he was coughing and felt short of breath. He requested a PRN nebulizer, so writer did call respiratory to come administer. Patient denied any difficulty with pain or discomfort.

## 2019-06-24 NOTE — PLAN OF CARE
PRN lozenges given X2 this shift. Independent in room. Denies pain. Uses call light appropriately. Bed in low position and call light within patient reach.

## 2019-06-24 NOTE — PLAN OF CARE
OT patient bothered by light numbness in R hand. Functional screening for dexterity and speed WNL. OT suggestions for R UE positioning  during sleep and not laying on limb as patient often does. OT Worked on general posture and breathing strategies for improved diaphragm strength. OT ed and training regarding deep abdominal conditioning patient can do daily in supine that will also contribute to abdominal strength tone and posture improvement. Patient involved in planning upcomming week tx strategies for continue progress toward meeting all OT goals.

## 2019-06-25 ENCOUNTER — RECORDS - HEALTHEAST (OUTPATIENT)
Dept: ADMINISTRATIVE | Facility: OTHER | Age: 49
End: 2019-06-25

## 2019-06-25 PROCEDURE — 97530 THERAPEUTIC ACTIVITIES: CPT | Mod: GO | Performed by: OCCUPATIONAL THERAPIST

## 2019-06-25 PROCEDURE — 94640 AIRWAY INHALATION TREATMENT: CPT

## 2019-06-25 PROCEDURE — 40000275 ZZH STATISTIC RCP TIME EA 10 MIN

## 2019-06-25 PROCEDURE — 92526 ORAL FUNCTION THERAPY: CPT | Mod: GN | Performed by: SPEECH-LANGUAGE PATHOLOGIST

## 2019-06-25 PROCEDURE — 97535 SELF CARE MNGMENT TRAINING: CPT | Mod: GO | Performed by: OCCUPATIONAL THERAPIST

## 2019-06-25 PROCEDURE — 25000132 ZZH RX MED GY IP 250 OP 250 PS 637: Performed by: PHYSICAL MEDICINE & REHABILITATION

## 2019-06-25 PROCEDURE — 92507 TX SP LANG VOICE COMM INDIV: CPT | Mod: GN | Performed by: SPEECH-LANGUAGE PATHOLOGIST

## 2019-06-25 PROCEDURE — 12800006 ZZH R&B REHAB

## 2019-06-25 PROCEDURE — 25000125 ZZHC RX 250: Performed by: PHYSICAL MEDICINE & REHABILITATION

## 2019-06-25 PROCEDURE — 25000132 ZZH RX MED GY IP 250 OP 250 PS 637: Performed by: STUDENT IN AN ORGANIZED HEALTH CARE EDUCATION/TRAINING PROGRAM

## 2019-06-25 PROCEDURE — 97110 THERAPEUTIC EXERCISES: CPT | Mod: GP

## 2019-06-25 RX ADMIN — GUAIFENESIN 200 MG: 200 SOLUTION ORAL at 13:40

## 2019-06-25 RX ADMIN — BENZOCAINE, MENTHOL 2 LOZENGE: 15; 3.6 LOZENGE ORAL at 13:47

## 2019-06-25 RX ADMIN — FUROSEMIDE 40 MG: 20 TABLET ORAL at 08:30

## 2019-06-25 RX ADMIN — POTASSIUM CHLORIDE 20 MEQ: 20 TABLET, EXTENDED RELEASE ORAL at 08:27

## 2019-06-25 RX ADMIN — BENZOCAINE, MENTHOL 2 LOZENGE: 15; 3.6 LOZENGE ORAL at 19:29

## 2019-06-25 RX ADMIN — AMLODIPINE BESYLATE 10 MG: 10 TABLET ORAL at 20:49

## 2019-06-25 RX ADMIN — ACETAMINOPHEN 650 MG: 325 TABLET, FILM COATED ORAL at 20:48

## 2019-06-25 RX ADMIN — TAMSULOSIN HYDROCHLORIDE 0.4 MG: 0.4 CAPSULE ORAL at 08:27

## 2019-06-25 RX ADMIN — METOPROLOL TARTRATE 100 MG: 50 TABLET, FILM COATED ORAL at 20:49

## 2019-06-25 RX ADMIN — POTASSIUM CHLORIDE 20 MEQ: 20 TABLET, EXTENDED RELEASE ORAL at 20:49

## 2019-06-25 RX ADMIN — BENZOCAINE, MENTHOL 2 LOZENGE: 15; 3.6 LOZENGE ORAL at 00:20

## 2019-06-25 RX ADMIN — ALLOPURINOL 100 MG: 100 TABLET ORAL at 08:27

## 2019-06-25 RX ADMIN — METOPROLOL TARTRATE 100 MG: 50 TABLET, FILM COATED ORAL at 08:30

## 2019-06-25 RX ADMIN — ASPIRIN 81 MG CHEWABLE TABLET 81 MG: 81 TABLET CHEWABLE at 08:28

## 2019-06-25 RX ADMIN — ATORVASTATIN CALCIUM 40 MG: 40 TABLET, FILM COATED ORAL at 20:49

## 2019-06-25 RX ADMIN — IPRATROPIUM BROMIDE AND ALBUTEROL SULFATE 3 ML: .5; 3 SOLUTION RESPIRATORY (INHALATION) at 08:53

## 2019-06-25 RX ADMIN — DEXTROMETHORPHAN 30 MG: 30 SUSPENSION, EXTENDED RELEASE ORAL at 08:28

## 2019-06-25 RX ADMIN — MULTIPLE VITAMINS W/ MINERALS TAB 1 TABLET: TAB at 08:27

## 2019-06-25 ASSESSMENT — MIFFLIN-ST. JEOR: SCORE: 2006.6

## 2019-06-25 NOTE — H&P
ENT NOTE:    [facility] History and Physical    Yadiel Roth MRN# [id number]   Age: 49 year old YOB: 1970     Date of Admission:  6/17/2019      Primary care provider: Titi Griffin          Assessment and Plan:   Assessment:   Left true vocal fold paralysis      Plan:   Flexible laryngoscopy today at bedside.    Continue speech therapy, thickened liquids if needed. We discussed the possibility that the recurrent laryngeal nerve function may return spontaneously.   The patient is not having dysphagia or aspiration so we will not do any intervention urgently.  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 We recommend close follow up in 2-3 weeks with Dr. Alonso for repeat evaluation and consideration of vocal fold injection, possibly with a temporary agent while we await possible return of function.  [phone]    Thank you for this interesting consult.    Orders Placed This Encounter   Procedures     CBC with platelets     Basic metabolic panel     Hemoglobin A1c     CBC with platelets differential     Glucose by meter     Glucose by meter     Glucose by meter     Glucose by meter     Glucose by meter     Glucose by meter     CBC with platelets     Basic metabolic panel     Glucose by meter     Glucose by meter     Glucose by meter     Glucose by meter     Glucose by meter     Glucose by meter     Glucose by meter     Glucose by meter     Glucose by meter     Glucose by meter     Glucose by meter     Cardiac Rehab Referral     Speech Therapy Referral     Vital signs     Pulse oximetry nursing     Nurse to order lidocaine product     Weigh patient     Weigh patient     Bladder scan     Wound care     Incentive spirometry nursing     Apply pneumatic compression device (PCD) - Bilateral Calf     Patient education - Diabetes     Assess for hypoglycemia symptoms     Notify Provider     Patient care order     Activity:  Up with assist     Discharge planning     Reason for your hospital stay     Activity     Full Code     Social Work IP Consult     Acute Rehab PT IP Consult     Acute Rehab OT IP Consult     Speech Language Path Adult IP Consult     Psychology Adult IP Consult     Diabetes Educator IP Consult     Nutrition Services Adult IP Consult     ENT IP Consult: 50 yo male with recent aortic aneurysm repair.  has hoarse voice. question of vocal cord injury; Consultant may enter orders: Yes; Patient to be seen: Routine - within 24 hours; Call back #: [phone]; Requesting provider? Attendi...     Spiritual Health Services IP Consult     Room Service     Snacks/Supplements Adult: Boost Glucose Control; With Meals     Oxygen: Nasal cannula     EZpap     Admit to Acute Inpatient Rehab     Fall precautions     Sternal precautions     Pneumatic Compression Device (Equipment) - Bilateral Calf               Chief Complaint:   Breathy voice          History of Present Illness:   This patient is a 49 year old male who presents for rehabilitation after aortic aneurysm repair. He had hoarse breathy voice immediately after the procedure. He is not improving since the procedure. He denies aspiration, choking, and dysphagia. However, he is modifying his diet as he is concerned about the possibility of these occurring. 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 His symptoms are moderate and unchanged. Nothing makes them better or worse. He has had treatment with speech therapy for his symptoms, yet unchanged. No prior history of dysphonia, hoarseness, or breathy voice.          Past Medical History:   No past medical history on file.          Past Surgical History:   No past surgical history on file.          Social History:     Social History     Tobacco Use     Smoking status: Not on file   Substance Use Topics     Alcohol use: Not on file             Family History:   No family history on file.          Immunizations:  "            Allergies:   No Known Allergies          Medications:     Current Facility-Administered Medications   Medication     acetaminophen (TYLENOL) tablet 325-650 mg     allopurinol (ZYLOPRIM) tablet 100 mg     amLODIPine (NORVASC) tablet 10 mg     aspirin (ASA) chewable tablet 81 mg     atorvastatin (LIPITOR) tablet 40 mg     benzocaine-menthol (CEPACOL) 15-3.6 MG lozenge 2 lozenge     bisacodyl (DULCOLAX) Suppository 10 mg     dextromethorphan (DELSYM) liquid 30 mg     furosemide (LASIX) tablet 40 mg     guaiFENesin (ROBITUSSIN) 20 mg/mL solution 200 mg     hydrOXYzine (ATARAX) tablet 25 mg     ipratropium - albuterol 0.5 mg/2.5 mg/3 mL (DUONEB) neb solution 3 mL     metoprolol tartrate (LOPRESSOR) tablet 100 mg     multivitamin w/minerals (THERA-VIT-M) tablet 1 tablet     ondansetron (ZOFRAN) tablet 4 mg     polyethylene glycol (MIRALAX/GLYCOLAX) Packet 17 g     potassium chloride ER (K-DUR/KLOR-CON M) CR tablet 20 mEq     senna-docusate (SENOKOT-S/PERICOLACE) 8.6-50 MG per tablet 1 tablet     tamsulosin (FLOMAX) capsule 0.4 mg             Review of Systems:   13 pt review of systems negative except as stated in HPI.          Physical Exam:   Blood pressure 99/61, pulse 82, temperature 97.9  F (36.6  C), temperature source Oral, resp. rate 18, height 1.753 m (5' 9\"), weight 115.6 kg (254 lb 14.4 oz), SpO2 95 %.  ENT:   Normocephalic, without obvious abnormality, atraumatic, sinuses nontender on palpation, external ears without lesions, external auditory canal patent, TM intact bilatearlly, hearing grossly normal bilaterally, nose: external exam normal, septum mucosa and turbinates normal. Lips, teeth, gums noraml. oral pharynx with moist mucous membranes, tonsils without erythema or exudates, Hoarse, very breathy voice.     Neck:   Supple, symmetrical, trachea midline, no adenopathy, thyroid symmetric, not enlarged and no tenderness, skin normal     Hematologic / Lymphatic:   no cervical lymphadenopathy and " no supraclavicular lymphadenopathy      Flexible laryngoscopy:  The right nare is anesthetized with 4% topical lidocaine and afrin. The flexible laryngoscope is inserted into the right nare. The true vocal folds are visualized. The left cord is paralytic. There are no other masses or lesions in the nasopharynx, oropharynx, hypopharynx, or supraglottis. There is no appreciable edema.           Data:     Results for orders placed or performed during the hospital encounter of 06/17/19   CBC with platelets   Result Value Ref Range    WBC 11.9 (H) 4.0 - 11.0 10e9/L    RBC Count 3.61 (L) 4.4 - 5.9 10e12/L    Hemoglobin 10.7 (L) 13.3 - 17.7 g/dL    Hematocrit 33.3 (L) 40.0 - 53.0 %    MCV 92 78 - 100 fl    MCH 29.6 26.5 - 33.0 pg    MCHC 32.1 31.5 - 36.5 g/dL    RDW 14.4 10.0 - 15.0 %    Platelet Count 306 150 - 450 10e9/L   Basic metabolic panel   Result Value Ref Range    Sodium 138 133 - 144 mmol/L    Potassium 4.0 3.4 - 5.3 mmol/L    Chloride 104 94 - 109 mmol/L    Carbon Dioxide 27 20 - 32 mmol/L    Anion Gap 7 3 - 14 mmol/L    Glucose 99 70 - 99 mg/dL    Urea Nitrogen 14 7 - 30 mg/dL    Creatinine 0.92 0.66 - 1.25 mg/dL    GFR Estimate >90 >60 mL/min/[1.73_m2]    GFR Estimate If Black >90 >60 mL/min/[1.73_m2]    Calcium 8.0 (L) 8.5 - 10.1 mg/dL   Hemoglobin A1c   Result Value Ref Range    Hemoglobin A1C 8.9 (H) 0 - 5.6 %   Glucose by meter   Result Value Ref Range    Glucose 128 (H) 70 - 99 mg/dL   Glucose by meter   Result Value Ref Range    Glucose 96 70 - 99 mg/dL   Glucose by meter   Result Value Ref Range    Glucose 110 (H) 70 - 99 mg/dL   Glucose by meter   Result Value Ref Range    Glucose 108 (H) 70 - 99 mg/dL   CBC with platelets differential   Result Value Ref Range    WBC 11.4 (H) 4.0 - 11.0 10e9/L    RBC Count 3.60 (L) 4.4 - 5.9 10e12/L    Hemoglobin 10.4 (L) 13.3 - 17.7 g/dL    Hematocrit 32.5 (L) 40.0 - 53.0 %    MCV 90 78 - 100 fl    MCH 28.9 26.5 - 33.0 pg    MCHC 32.0 31.5 - 36.5 g/dL    RDW 14.4  10.0 - 15.0 %    Platelet Count 359 150 - 450 10e9/L    Diff Method Automated Method     % Neutrophils 67.9 %    % Lymphocytes 15.7 %    % Monocytes 10.2 %    % Eosinophils 2.0 %    % Basophils 0.2 %    % Immature Granulocytes 4.0 %    Nucleated RBCs 0 0 /100    Absolute Neutrophil 7.7 1.6 - 8.3 10e9/L    Absolute Lymphocytes 1.8 0.8 - 5.3 10e9/L    Absolute Monocytes 1.2 0.0 - 1.3 10e9/L    Absolute Eosinophils 0.2 0.0 - 0.7 10e9/L    Absolute Basophils 0.0 0.0 - 0.2 10e9/L    Abs Immature Granulocytes 0.5 (H) 0 - 0.4 10e9/L    Absolute Nucleated RBC 0.0    Glucose by meter   Result Value Ref Range    Glucose 102 (H) 70 - 99 mg/dL   Glucose by meter   Result Value Ref Range    Glucose 97 70 - 99 mg/dL   Glucose by meter   Result Value Ref Range    Glucose 100 (H) 70 - 99 mg/dL   Glucose by meter   Result Value Ref Range    Glucose 112 (H) 70 - 99 mg/dL   Glucose by meter   Result Value Ref Range    Glucose 107 (H) 70 - 99 mg/dL   Glucose by meter   Result Value Ref Range    Glucose 96 70 - 99 mg/dL   Glucose by meter   Result Value Ref Range    Glucose 96 70 - 99 mg/dL   Glucose by meter   Result Value Ref Range    Glucose 115 (H) 70 - 99 mg/dL   CBC with platelets   Result Value Ref Range    WBC 12.9 (H) 4.0 - 11.0 10e9/L    RBC Count 3.87 (L) 4.4 - 5.9 10e12/L    Hemoglobin 11.1 (L) 13.3 - 17.7 g/dL    Hematocrit 34.1 (L) 40.0 - 53.0 %    MCV 88 78 - 100 fl    MCH 28.7 26.5 - 33.0 pg    MCHC 32.6 31.5 - 36.5 g/dL    RDW 14.2 10.0 - 15.0 %    Platelet Count 460 (H) 150 - 450 10e9/L   Basic metabolic panel   Result Value Ref Range    Sodium 136 133 - 144 mmol/L    Potassium 4.0 3.4 - 5.3 mmol/L    Chloride 105 94 - 109 mmol/L    Carbon Dioxide 26 20 - 32 mmol/L    Anion Gap 5 3 - 14 mmol/L    Glucose 104 (H) 70 - 99 mg/dL    Urea Nitrogen 14 7 - 30 mg/dL    Creatinine 0.93 0.66 - 1.25 mg/dL    GFR Estimate >90 >60 mL/min/[1.73_m2]    GFR Estimate If Black >90 >60 mL/min/[1.73_m2]    Calcium 8.4 (L) 8.5 - 10.1 mg/dL    Glucose by meter   Result Value Ref Range    Glucose 96 70 - 99 mg/dL   Glucose by meter   Result Value Ref Range    Glucose 86 70 - 99 mg/dL   Glucose by meter   Result Value Ref Range    Glucose 92 70 - 99 mg/dL   Glucose by meter   Result Value Ref Range    Glucose 102 (H) 70 - 99 mg/dL   Glucose by meter   Result Value Ref Range    Glucose 101 (H) 70 - 99 mg/dL   Diabetes Educator IP Consult    Carin Sepulveda, CNS     6/24/2019 12:08 PM  Diabetes Education  Received consult request to see this 49 year old male for   diabetes education, for new diagnosis of type 2 diabetes.  His   A1c on admission was 8.9%.  His glucoses were  from   6/18-6/22/19, then glucose checks were discontinued.  Per physical medicine, plan is to have patient follow-up on   diabetes diagnosis with his PCP.  No education needed at this time.  Will sign off.  Carin Chand MS, APRN, CNS, CDE, CDTC  [phone]         Ginger Logan,

## 2019-06-25 NOTE — PLAN OF CARE
FOCUS/GOAL  Bladder management, Medication management and Medical management    ASSESSMENT, INTERVENTIONS AND CONTINUING PLAN FOR GOAL:  Pt is alert and oriented x4. Blood pressures a little low at beginning of shift, oral fluids encouraged. Pt complaining of feeling SOB during shift after coughing fit. Pt states his throat tickles and he begins coughing and becomes SOB. Oxygen via nasal cannula at 1-2 LPM using intermittently during shift. PRN neb administered, cepacol lozenges given x2 this shift. Pt cont of bladder using toilet. Pt is ind in room for mobility. Dressing on chest tube sites changed, almost scabbed over.

## 2019-06-25 NOTE — PLAN OF CARE
Discharge Planner OT      -5 min. 2nd session 2/2 arrival of ENT.    Patient plan for discharge: home  Current status: pt. Indep. With BADLs and functional mobility, ambulating without AD. Pt. Tolerated amb. Throughout hallways ~10 min. with 1 seated rest break.  Barriers to return to prior living situation: none  Recommendations for discharge: home with OP CR  Rationale for recommendations: to max. Safety/indep. With ADLs/mobility in home/community setting.       Entered by: Fozia Daniel 06/25/2019 8:06 AM

## 2019-06-25 NOTE — PLAN OF CARE
Pt tolerated 12 steps x 2 SBA with seated rest break. Introduced to HEP including standing exercises, Nustep and STS transfers. Pt on room air during both sessions and vitals remained WNL. Continue with improving endurance in stairs and amb.

## 2019-06-25 NOTE — PLAN OF CARE
FOCUS/GOAL  Bladder management, Pain management, Mobility, Cognition/Memory/Judgment/Problem solving and Safety management    ASSESSMENT, INTERVENTIONS AND CONTINUING PLAN FOR GOAL:  Pt is alert and oriented. Continent of bladder, voiding without difficulty using the toilet. No complaints of pain or discomfort this shift. Requested and received Cepacol lozenges x 1 with adequate relief. Pt up independently in room. Slept between rounds. Uses call light appropriately, able to make needs known. Will continue with POC.

## 2019-06-25 NOTE — PLAN OF CARE
PT: focus continues on functional endurance training. Pt able to ambulate outside with no LOB issues, needing x1 seated rest break. Initiated HIIT for added intensity. Pt set up on home exercise program including strengthening and walking program.

## 2019-06-25 NOTE — PLAN OF CARE
Acute Rehab Care Conference/Team Rounds      Type: Team Rounds    Present: Dr. Alli Schmitt, Jimena Mar RN,Fabiana Sotelo SW, Roro French OT, George Latif PT, Lindsey Blair SLP, Sierra Warren , Sugey Booker, Sahara Verdugo Dietician      Discharge Barriers/Treatment/Education    Rehab Diagnosis: 49 year old male with a PMH including but not limited to HTN, gout, LVH, carpal tunnel syndrome, prediabetes, morbid obesity, and an ascending aortic aneurysm who presented to [facility] on 6/10/19 for a planned repair of ascending aortic aneurysm and aortic arch.  Hospital course complicated by post-operative pain, hypoxia requiring supplemental oxygen, and urinary retention requiring temporary Jenkins catheter.    Active Medical Co-morbidities/Prognosis: see HPI and all notes.    Safety: Up independently in room. Uses call light appropriately. Occasionally wears O2 per nasal cannula as needed.     Pain: Complains of sore throat. Uses Cepacol lozenges with good affect.     Medications, Skin, Tubes/Lines: Medications taken whole with water. He knows his meds. No need for MAP. Skin with sternal incision and scabbing to old CT sites. No tubes or lines.    Swallowing/Nutrition: Pt continues with regular diet with thin liquids- is tolerating with use of safe swallow strategies- slow rate, small bites/sips but on occasional does have some intermittent coughing but pt has this at baseline without po - monitoring closely still due to this. Awaiting ENT report- had consult today due to vocal cord involvement- will provide more details as to whether pt is adequately able to protect his airway    Bowel/Bladder: Continent of bladder, voiding without difficulty using toilet. Continent of bowel, last BM today 6/25/19.     Psychosocial: Had been living alone, but will be with wife and children at discharge. Wife will transport at discharge.    ADLs/IADLs: Pt is IND in the room and with ADL routine.  "Working on endurance and activity tolerance. Pt making good progress. Recommend pt get a shower chair for energy conservation. Barriers are stairs at home. Do not anticipate OT at discharge but pt will follow up with CR [place] [facility]    Mobility: Independent in room, navigating 12x8\" steps withi single rail CGA-SBA. Progressing well with endurance and strength. Recommend OP cardiac rehab at discharge. No additional DME recommended.     Cognition/Language: Have focused on some vocal hygiene techniques but further voice tx has been dependent on results of ENT consult that was completed today- do not yet have report. Pt's voice remains very dysphonic- ? Of vocal cord edema/irritation vs nerve damage. Will await result of ENT consult and provided further tx dependent on this.Pt's other language skills are intact; problem solving and reasoning intact based on higher level formal assessment. Pt with mild memory impairments based on standardized assessment- making progress with use of memory strategies.Recommend OP sptx following discharge (likely for voice and swallowing pending ENT consult and possible for mild memory impairments- pending progress with this goal)      Community Re-Entry: supervision amb community distances    Transportation: requires assist    Decision maker: self    Plan of Care and goals reviewed and updated.    Discharge Plan/Recommendations    Fall Precautions: continue    Patient/Family input to goals:     Anticipated rehab needs following discharge: outpatient Cardiac rehab and possibly SLP (depending upon ENT recommendation from consult - to be seen today)    Anticipated care giver support after discharge: wife    Estimated length of stay: Thursday 6/27    Overall plan for the patient: as above.      Utilization Review and Continued Stay Justification    Medical Necessity Criteria:    For any criteria that is not met, please document reason and plan for discharge, " transfer, or modification of plan of care to address.    Requires intensive rehabilitation program to treat functional deficits?: Yes    Requires 3x per week or greater involvement of rehabilitation physician to oversee rehabilitation program?: Yes    Requires rehabilitation nursing interventions?: Yes    Patient is making functional progress?: Yes    There is a potential for additional functional progress? Yes    Patient is participating in therapy 3 hours per day a minimum of 5 days per week or 15 hours per week in 7 day period?:Yes    Has discharge needs that require coordinated discharge planning approach?:Yes      Barriers/Concerns related to meeting medical necessity criteria:  none    Team Plan to Address Concern:  na      Final Physician Sign off    Statement of Approval: reviewed and approved. Jacek Schmitt      Patient Goals  Social Work goal: patient/family will be involved in discharge planning  Social Work goal: appropriate services will be arranged and resources will be provided according to patient's needs    OT goal: hygiene/grooming: modified independent  OT goal: upper body dressing: Modified independent, within precautions  OT goal: lower body dressing: Modified independent, including set-up/clothing retrieval  OT goal: upper body bathing: Modified independent  OT goal: lower body bathing: Modified independent  OT Goal: transfer: Supervision/stand-by assist(tub transfer)  OT goal: toilet transfer/toileting: Modified independent, toilet transfer, cleaning and garment management  OT goal: meal preparation: Modified independent, with simple meal preparation  OT goal: perform aerobic activity with stable cardiovascular response: continuous activity, 30 minutes(ADL routine, household tasks)  OT/CONCETTA face-to-face collaboration occurred, patient progress and plan of care reviewed.    PT Frequency: 90 Min daily  PT goal: bed mobility: (met)  PT goal: transfers: (met)  PT goal: gait: (met)  PT goal: stairs:  Modified independent, Assistive device, Greater than 10 stairs, Rail on both sides(SEC + rail, 30 stairs)  PT goal: perform aerobic activity with stable cardiovascular response: continuous activity, 15 minutes  PT goal 1: Pt will demonstrate understanding of HEP for standing LE strengthening  PT Goal 2: (met)  PT Goal 3: (met)  PT Goal 4: Pt will demonstrate understanding of sequencing and safety of floor transfer  PT Goal 5: Pt riley complete 6MWT       SLP Frequency: Daily  SLP Swallow Goal:  Safely tolerate diet without signs/symptoms of aspiration: regular diet, thin liquids, independently  SLP goal 1: Patient will complete appropriate vocal exercises independently with 90% accuracy.  SLP goal 2: Patient will utilize compensatory memory strategies to recall novel information with presence of distractions and delays with 90% accuracy.               Patient Goal:  Pain Management: Patient shall self advocate for interventions to reduce pain to tolerable levels  Patient/Family Goal: Medication Management: Patient shall pass MAP before discharge  Goal: Safety Management: Patient shall appropriately use the call light to ask for assistance

## 2019-06-25 NOTE — PLAN OF CARE
SLP: Patient seen for dysphagia and communication treatment. Patient reported working on breathing from diaphragm to improve voice. Patient also reported he has ENT f/u scheduled (per MD scheduled for today). Vocal quality whispered throughout session. Patient reported writing things down to recall important information (e.g. appointments, medical information). Patient consumed several sips water by straw. No overt aspiration signs occurred with small single sips. Patient able to implement swallow strategies independently. Patient reported occasional coughing with swallowing liquids but that this has improved and does not occur daily but only once and awhile. Patient described cutting foods smaller due to fear of choking if coughing spell would occur. Patient also reported selecting softer menu items.

## 2019-06-25 NOTE — PROGRESS NOTES
"  [facility]   Acute Rehabilitation Unit  Daily progress note    INTERVAL HISTORY  No acute events overnight. The patient continues to have episodes of coughing that result in some chest / pain tightness, which relieves upon cessation of coughing. The patient feels he may be \"coming down with something.\" The frequency of his coughing is pretty much the same, but he notes some mucous now that was not present previously. He says the delsym PRN helped. Is awaiting eval from ENT today.     MEDICATIONS  Scheduled:    allopurinol  100 mg Oral Daily     amLODIPine  10 mg Oral QPM     aspirin  81 mg Oral Daily     atorvastatin  40 mg Oral At Bedtime     furosemide  40 mg Oral Daily     metoprolol tartrate  100 mg Oral BID     multivitamin w/minerals  1 tablet Oral Daily     potassium chloride ER  20 mEq Oral BID     tamsulosin  0.4 mg Oral Daily        PRN:  acetaminophen, sore throat lozenge, bisacodyl, dextromethorphan, guaiFENesin, hydrOXYzine, ipratropium - albuterol 0.5 mg/2.5 mg/3 mL, ondansetron, polyethylene glycol, senna-docusate       PHYSICAL EXAM  Patient Vitals for the past 24 hrs:   BP Temp Temp src Pulse Heart Rate Resp SpO2 Weight   06/25/19 0829 119/72 -- -- 85 -- -- -- --   06/25/19 0720 123/60 97.8  F (36.6  C) Oral 85 -- -- 99 % --   06/25/19 0712 -- -- -- -- -- -- -- 115.6 kg (254 lb 14.4 oz)   06/24/19 2154 130/59 -- -- -- 86 -- -- --   06/24/19 1748 97/42 97.1  F (36.2  C) Oral -- 91 16 99 % --   06/24/19 1615 98/56 98.3  F (36.8  C) Oral 78 -- 16 96 % --       GEN: NAD, pleasant and cooperative, sitting up in bed   HEENT: NC/AT, EOMI, voice quality hoarse / whispered   CVS: RRR, no murmurs  PULM: CTAB, good breath sounds, no increased WOB, cough   ABD: Soft, ND, bowel sounds present  EXT: moves all extremities spontaneously  Neuro: Answers appropriately, follows commands, ind in room with mobility    LABS  CBC RESULTS:   Recent Labs   Lab Test 06/18/19  0622   WBC " 11.9*   RBC 3.61*   HGB 10.7*   HCT 33.3*   MCV 92   MCH 29.6   MCHC 32.1   RDW 14.4          Last Comprehensive Metabolic Panel:  Sodium   Date Value Ref Range Status   06/21/2019 136 133 - 144 mmol/L Final     Potassium   Date Value Ref Range Status   06/21/2019 4.0 3.4 - 5.3 mmol/L Final     Chloride   Date Value Ref Range Status   06/21/2019 105 94 - 109 mmol/L Final     Carbon Dioxide   Date Value Ref Range Status   06/21/2019 26 20 - 32 mmol/L Final     Anion Gap   Date Value Ref Range Status   06/21/2019 5 3 - 14 mmol/L Final     Glucose   Date Value Ref Range Status   06/21/2019 104 (H) 70 - 99 mg/dL Final     Urea Nitrogen   Date Value Ref Range Status   06/21/2019 14 7 - 30 mg/dL Final     Creatinine   Date Value Ref Range Status   06/21/2019 0.93 0.66 - 1.25 mg/dL Final     GFR Estimate   Date Value Ref Range Status   06/21/2019 >90 >60 mL/min/[1.73_m2] Final     Comment:     Non  GFR Calc  Starting 12/18/2018, serum creatinine based estimated GFR (eGFR) will be   calculated using the Chronic Kidney Disease Epidemiology Collaboration   (CKD-EPI) equation.       Calcium   Date Value Ref Range Status   06/21/2019 8.4 (L) 8.5 - 10.1 mg/dL Final       Recent Labs   Lab 06/22/19  0729 06/21/19  2111 06/21/19  1718 06/21/19  1215 06/21/19  1040 06/21/19  0745 06/20/19  2105   GLC  --   --   --   --  104*  --   --    * 102* 92 86  --  96 115*     Lab Results   Component Value Date    A1C 8.9 06/18/2019         ASSESSMENT  Yadiel Roth is a 49 year old male with a PMH including but not limited to HTN, gout, LVH, carpal tunnel syndrome, prediabetes, morbid obesity, and an ascending aortic aneurysm who presented to [facility] on 6/10/19 for a planned repair of ascending aortic aneurysm and aortic arch.  Hospital course complicated by post-operative pain, hypoxia requiring supplemental oxygen, and urinary retention requiring temporary Jenkins catheter.      Impairment group  code: 09 Cardiac: s/p repair of ascending aorta aneurysm and aortic arch      PLAN  Rehabilitation  -Continue with inpatient rehabilitation including PT, OT, SLP, rehab nursing, social work, and close monitoring by physiatry  -The patient has impairments in strength, balance, and endurance, which are leading to activity limitations in ambulation, mobility, ADLs, and functional communication      Medical  1)CVS  -s/p repair of ascending aorta aneurysm and aortic arch               -ASA 81 mg daily               -Cardiac precautions - No lifting >10 lbs, minimal overhead reaching, no extreme arm movements                      -Wash incisions daily with soap and water               -Daily weights               -Lasix 40 mg daily and potassium supplementation 20 mEq BID for 2 weeks each (last dose 6/30).  Monitor kidney function and electrolytes (last Cr 0.93 on 6/21, recheck 6/26)               -CV surgery appointment 7/2/19 at 1:00 pm ([facility])               -Cardiology appointment 8/5? (will need to confirm) at [facility]               -Cardiac rehab after discharge  -HTN: Norvasc 10 mg every evening, Metoprolol 100 mg BID  -HLD: Lipitor 40 mg qHS       2)Pulmonary: patient's cough is bothersome, especially in setting of recent chest / thoracic surgery.  -Pulmonary toilet - Continue EZPap QID with RT and duonebs q4h PRN  -Encourage incentive spirometry  -O2 PRN for comfort - encouraging wean as no home O2, patient has been weaning  -cepacol lozenge PRN for cough, added delsym q12h PRN on 6/24 - patient says help. In setting of mucous production, also will add guaifenesin q6h PRN as of 6/25.  -ENT to eval patient today, appreciate recs        3)FENGI  -Diet: Regular consistency solids, thin liquids.  Low salt and fat.  -Bowels: PRN Senokot-S, Miralax, Dulcolax suppository  -Zofran PRN  -MVT daily   -Monitor potassium levels.  Continue with 20 mEq BID supplementation while on Lasix (see above).   "Potassium 4.0 on 6/18 and 6/21, next recheck 6/26.        4)  -Post-operative urinary retention, now with Jenkins removed  -PVR x1 15 ml.   -Cr stable.  -Continue with Flomax 0.4 mg daily        5)DVT prophylaxis  -Mechanical only.  Ambulating adequate distances to deter DVTs so no chemoprophylaxis.        6)Pain  -Tylenol 325-650 mg q4h PRN       7)Endo  -Diabetes - HbA1c, 8.9 = newly diagnosed diabetes. Sliding scale insulin and diabetes education ordered. Intention was to likely start Metformin, however, blood glucose values have been in 80s-110s. Blood glucoses normal - only slightly elevated. Will not start metformin, sliding scale insulin discontinued, glucose checks discontinued.  - PCP f/u as outpatient       8)Heme  -Hgb 10.4 on 6/19, recheck 6/21 was 11.1. BRB per rectum noted 6/20 with BM x 2. No hx of hemorrhoids.  6/21 no recurrence hemoglobin stable/improved  -WBCs 12.9. Patient has not been febrile, but temperatures had been in the 99 range, improving to more normalcy 6/21.  Would initiate full infectious workup if spikes fever.       9)Psych: denies hx of anxiety or depression  -Atarax PRN for anxiety, sensation of \"choking\"  -Patient agreeable to rehab health psych consult - placed 6/20  -Could consider further meds if continued issues with anxiety and / or mood  -Alternative medicine services recommended (I.e. Acupuncture, essential oils, etc. )  - following        10)?Ulnar neuropathy: patient noted diminished sensation in digits 3-5 on the R on 6/20 that had been occurring for several days. Educated on positioning. Patient denied this as an issue previously. Some R 5th digit weakness. Monitor. Could consider EMG/NCS.      11)Social/Dispo  -Anticipate discharge home at a modified independent level for ambulation, mobility, and ADLs with the least restrictive assistive device  -ELOS: 6/27  -Rehab prognosis: Good  -Follow up appointments: PCP, CV surgery, cardiology, ENT, PM&R     Code " status: Full Code      Melina Escobar, DO  PGY2 Department of Rehabilitation Medicine      I, Dr. Schmitt, have seen and examined the patient. I have reviewed the above resident's note and agree with content.  Total time: >25 min including patient evaluation, chart review, and care coordination. Attended team rounds today - see separate note for details.    DAVID Schmitt MD PhD

## 2019-06-26 PROCEDURE — 40000275 ZZH STATISTIC RCP TIME EA 10 MIN

## 2019-06-26 PROCEDURE — 12800006 ZZH R&B REHAB

## 2019-06-26 PROCEDURE — 97535 SELF CARE MNGMENT TRAINING: CPT | Mod: GO

## 2019-06-26 PROCEDURE — 92526 ORAL FUNCTION THERAPY: CPT | Mod: GN | Performed by: SPEECH-LANGUAGE PATHOLOGIST

## 2019-06-26 PROCEDURE — 97110 THERAPEUTIC EXERCISES: CPT | Mod: GP

## 2019-06-26 PROCEDURE — 97530 THERAPEUTIC ACTIVITIES: CPT | Mod: GP

## 2019-06-26 PROCEDURE — 25000132 ZZH RX MED GY IP 250 OP 250 PS 637: Performed by: PHYSICAL MEDICINE & REHABILITATION

## 2019-06-26 PROCEDURE — 94640 AIRWAY INHALATION TREATMENT: CPT | Mod: 76

## 2019-06-26 PROCEDURE — 97750 PHYSICAL PERFORMANCE TEST: CPT | Mod: GP

## 2019-06-26 PROCEDURE — 94640 AIRWAY INHALATION TREATMENT: CPT

## 2019-06-26 PROCEDURE — 97127 ZZHC SP THERAPEUTIC INTERVENTION W/FOCUS ON COGNITIVE FUNCTION,EA 15 MIN: CPT | Mod: GN | Performed by: SPEECH-LANGUAGE PATHOLOGIST

## 2019-06-26 PROCEDURE — 25000125 ZZHC RX 250: Performed by: PHYSICAL MEDICINE & REHABILITATION

## 2019-06-26 PROCEDURE — 97110 THERAPEUTIC EXERCISES: CPT | Mod: GO

## 2019-06-26 PROCEDURE — 25000132 ZZH RX MED GY IP 250 OP 250 PS 637: Performed by: STUDENT IN AN ORGANIZED HEALTH CARE EDUCATION/TRAINING PROGRAM

## 2019-06-26 PROCEDURE — 97110 THERAPEUTIC EXERCISES: CPT | Mod: GP | Performed by: PHYSICAL THERAPIST

## 2019-06-26 RX ORDER — ATORVASTATIN CALCIUM 40 MG/1
40 TABLET, FILM COATED ORAL AT BEDTIME
Qty: 30 TABLET | Refills: 0 | Status: SHIPPED | OUTPATIENT
Start: 2019-06-26 | End: 2024-07-09

## 2019-06-26 RX ORDER — ASPIRIN 81 MG/1
81 TABLET, CHEWABLE ORAL DAILY
Qty: 30 TABLET | Refills: 0 | Status: SHIPPED | OUTPATIENT
Start: 2019-06-26

## 2019-06-26 RX ORDER — ALLOPURINOL 100 MG/1
100 TABLET ORAL DAILY
Qty: 30 TABLET | Refills: 0 | Status: SHIPPED | OUTPATIENT
Start: 2019-06-27 | End: 2024-07-09

## 2019-06-26 RX ORDER — DEXTROMETHORPHAN POLISTIREX 30 MG/5ML
30 SUSPENSION ORAL EVERY 12 HOURS PRN
Qty: 89 ML | Refills: 0 | Status: SHIPPED | OUTPATIENT
Start: 2019-06-26

## 2019-06-26 RX ORDER — ACETAMINOPHEN 325 MG/1
325-650 TABLET ORAL EVERY 4 HOURS PRN
COMMUNITY
Start: 2019-06-26

## 2019-06-26 RX ORDER — POLYETHYLENE GLYCOL 3350 17 G/17G
17 POWDER, FOR SOLUTION ORAL DAILY PRN
COMMUNITY
Start: 2019-06-26

## 2019-06-26 RX ORDER — MULTIPLE VITAMINS W/ MINERALS TAB 9MG-400MCG
1 TAB ORAL DAILY
Qty: 30 TABLET | Refills: 0 | Status: SHIPPED | OUTPATIENT
Start: 2019-06-27

## 2019-06-26 RX ORDER — METOPROLOL TARTRATE 100 MG
100 TABLET ORAL 2 TIMES DAILY
Qty: 60 TABLET | Refills: 0 | Status: SHIPPED | OUTPATIENT
Start: 2019-06-26 | End: 2024-07-09

## 2019-06-26 RX ORDER — POTASSIUM CHLORIDE 1500 MG/1
20 TABLET, EXTENDED RELEASE ORAL
Qty: 7 TABLET | Refills: 0 | Status: SHIPPED | OUTPATIENT
Start: 2019-06-26

## 2019-06-26 RX ORDER — FUROSEMIDE 40 MG
40 TABLET ORAL DAILY
Qty: 3 TABLET | Refills: 0 | Status: SHIPPED | OUTPATIENT
Start: 2019-06-26

## 2019-06-26 RX ORDER — AMOXICILLIN 250 MG
1 CAPSULE ORAL 2 TIMES DAILY PRN
COMMUNITY
Start: 2019-06-26

## 2019-06-26 RX ORDER — TAMSULOSIN HYDROCHLORIDE 0.4 MG/1
0.4 CAPSULE ORAL DAILY
Qty: 30 CAPSULE | Refills: 0 | Status: SHIPPED | OUTPATIENT
Start: 2019-06-27

## 2019-06-26 RX ORDER — POTASSIUM CHLORIDE 1500 MG/1
20 TABLET, EXTENDED RELEASE ORAL 2 TIMES DAILY
COMMUNITY
Start: 2019-06-26

## 2019-06-26 RX ORDER — AMLODIPINE BESYLATE 10 MG/1
10 TABLET ORAL EVERY EVENING
Qty: 30 TABLET | Refills: 0 | Status: SHIPPED | OUTPATIENT
Start: 2019-06-26 | End: 2024-07-09

## 2019-06-26 RX ORDER — ASPIRIN 81 MG
100 TABLET, DELAYED RELEASE (ENTERIC COATED) ORAL 2 TIMES DAILY PRN
COMMUNITY
Start: 2019-06-26

## 2019-06-26 RX ADMIN — AMLODIPINE BESYLATE 10 MG: 10 TABLET ORAL at 20:58

## 2019-06-26 RX ADMIN — GUAIFENESIN 200 MG: 200 SOLUTION ORAL at 08:45

## 2019-06-26 RX ADMIN — METOPROLOL TARTRATE 100 MG: 50 TABLET, FILM COATED ORAL at 20:58

## 2019-06-26 RX ADMIN — GUAIFENESIN 200 MG: 200 SOLUTION ORAL at 13:38

## 2019-06-26 RX ADMIN — ASPIRIN 81 MG CHEWABLE TABLET 81 MG: 81 TABLET CHEWABLE at 08:44

## 2019-06-26 RX ADMIN — FUROSEMIDE 40 MG: 20 TABLET ORAL at 08:44

## 2019-06-26 RX ADMIN — ALLOPURINOL 100 MG: 100 TABLET ORAL at 08:44

## 2019-06-26 RX ADMIN — GUAIFENESIN 200 MG: 200 SOLUTION ORAL at 20:59

## 2019-06-26 RX ADMIN — DEXTROMETHORPHAN 30 MG: 30 SUSPENSION, EXTENDED RELEASE ORAL at 02:35

## 2019-06-26 RX ADMIN — POTASSIUM CHLORIDE 20 MEQ: 20 TABLET, EXTENDED RELEASE ORAL at 20:59

## 2019-06-26 RX ADMIN — DEXTROMETHORPHAN 30 MG: 30 SUSPENSION, EXTENDED RELEASE ORAL at 13:39

## 2019-06-26 RX ADMIN — IPRATROPIUM BROMIDE AND ALBUTEROL SULFATE 3 ML: .5; 3 SOLUTION RESPIRATORY (INHALATION) at 14:18

## 2019-06-26 RX ADMIN — TAMSULOSIN HYDROCHLORIDE 0.4 MG: 0.4 CAPSULE ORAL at 08:44

## 2019-06-26 RX ADMIN — BENZOCAINE, MENTHOL 2 LOZENGE: 15; 3.6 LOZENGE ORAL at 08:45

## 2019-06-26 RX ADMIN — METOPROLOL TARTRATE 100 MG: 50 TABLET, FILM COATED ORAL at 08:44

## 2019-06-26 RX ADMIN — BENZOCAINE, MENTHOL 2 LOZENGE: 15; 3.6 LOZENGE ORAL at 13:41

## 2019-06-26 RX ADMIN — ATORVASTATIN CALCIUM 40 MG: 40 TABLET, FILM COATED ORAL at 20:59

## 2019-06-26 RX ADMIN — MULTIPLE VITAMINS W/ MINERALS TAB 1 TABLET: TAB at 08:44

## 2019-06-26 RX ADMIN — BENZOCAINE, MENTHOL 2 LOZENGE: 15; 3.6 LOZENGE ORAL at 20:59

## 2019-06-26 RX ADMIN — BENZOCAINE, MENTHOL 2 LOZENGE: 15; 3.6 LOZENGE ORAL at 02:37

## 2019-06-26 RX ADMIN — POTASSIUM CHLORIDE 20 MEQ: 20 TABLET, EXTENDED RELEASE ORAL at 10:47

## 2019-06-26 RX ADMIN — IPRATROPIUM BROMIDE AND ALBUTEROL SULFATE 3 ML: .5; 3 SOLUTION RESPIRATORY (INHALATION) at 09:04

## 2019-06-26 ASSESSMENT — MIFFLIN-ST. JEOR: SCORE: 1957.16

## 2019-06-26 NOTE — DISCHARGE SUMMARY
[facility]   Acute Rehabilitation Unit  Discharge summary     Date of Admission: 6/17/2019  Date of Discharge: 6/27/2019  Disposition: home with family   Primary Care Physician: Titi Griffin  Attending physician: Jacek Schmitt MD  Other significant physician provider(s): Melina Escobar DO      DISCHARGE DIAGNOSIS      09 Cardiac: s/p repair of ascending aorta aneurysm and aortic arch    Functional impairments  1. Impaired functional mobility  2. Impaired ADLs  3. Impaired cognition     HTN    HLD    Cough    True vocal fold paralysis (left)    DMT2    Leukocytosis    Anxiety       BRIEF SUMMARY  (COPIED FROM [facility])    Yadiel Roth is a 49 year old male with a PMH including but not limited to HTN, gout, LVH, carpal tunnel syndrome, prediabetes, morbid obesity, and an ascending aortic aneurysm who presented to [facility] on 6/10/19 for a planned repair of ascending aortic aneurysm and aortic arch.  No intra-operative complications noted.  Post-operatively had pain, hypoxia requiring supplemental oxygen, and urinary retention requiring temporary Jenkins catheter.  Has now been weaned off opioid pain medications at the time of discharge, and chest tubes have been removed.       Currently Mr. Roth is feeling short of breath, and feels like he is having difficulty coughing and clearing his lungs.  He also has incisional pain with coughing.  Has dysphonia which has been present since the surgery.  Denies any problems with bowel or bladder, and appetite has been good.  Denies nausea or vomiting.       REHABILITAITON COURSE    Yadiel Roth is a 49 year old male with a PMH including but not limited to HTN, gout, LVH, carpal tunnel syndrome, prediabetes, morbid obesity, and an ascending aortic aneurysm who presented to [facility] on 6/10/19 for a planned repair of ascending aortic aneurysm and aortic arch.  Hospital course complicated by post-operative  pain, hypoxia requiring supplemental oxygen, and urinary retention requiring temporary Jenkins catheter.     Impairment group code: 09 Cardiac: s/p repair of ascending aorta aneurysm and aortic arch     PT:  Patient to work on strength, balance, deconditioning as well as gait. At discharge patient was independent in his room with mobility. On his 2 min walk test just prior to discharge he walked 398feet. OP cardiac rehab to continue progressing activity tolerance toward PLOF. Pt has met all PT goals and is IND with all mobility including gait ~x1000' at a time, sit<>stands, car transfers, and 21 stairs with single rail to access apartment. Pt remains limited by poor endurance to continue being addressed with OP CR. No DME needs at discharge. Pt has been set up on home exercise program for LE strengthening, walking, and NuStep at apart\[facility]\"".    OT: Patient to work on improvement in ADLs, upper body strength, cognition. At discharge,  pt. Indep. With BADLs and functional mobility, ambulating without AD. Pt. Tolerated amb. Throughout hallways ~10 min. with 1 seated rest break. Recommendations for discharge: home with OP CR. Rationale for recommendations: to max. Safety/indep. With ADLs/mobility in home/community setting.    SLP: Patient to work on safe swallowing strategies, cognition, voice quality issues and compensatory strategies. At discharge,  Swallowing/Nutrition: Pt continues with regular diet with thin liquids- is tolerating with use of safe swallow strategies- slow rate, small bites/sips but on occasional does have some intermittent coughing but pt has this at baseline without po - SLP has been continuing to monitor closely d/t this.  Cognition/Language:  Pt's voice remains very dysphonic, ENT report from 6/25/19 revealed Left true vocal fold paralysis, no urgent intervention was recommended as Pt is not having dysphagia or aspiration, however Pt is still at risk for aspiration.  May have repeat  "evaluation in 2-3 weeks with consideration of vocal fold injection.  Pt's other language skills are intact; problem solving and reasoning intact based on higher level formal assessment. Pt with mild memory impairments based on standardized assessment however has been making progress with use of memory strategies and currently reports is baseline so no further intervention warranted. Recommend OP sptx following discharge (for voice and swallowing).      MEDICAL COURSE       Medical  1)CVS  -s/p repair of ascending aorta aneurysm and aortic arch               -ASA 81 mg daily               -Cardiac precautions - No lifting >10 lbs, minimal overhead reaching, no extreme arm movements, until cleared by surgeon                  -Wash incisions daily with soap and water               -Daily weights               -Lasix 40 mg daily and potassium supplementation 20 mEq BID for 2 weeks each (last dose 6/30).  Monitor kidney function and electrolytes (last Cr 0.93 on 6/21).               -CV surgery appointment 8/5/19 at 3:10 pm at [facility]                -Cardiology appointment 7/2/19 at 1:00pm at [facility]                -Cardiac rehab after discharge    -[place] [facility] f/u  -HTN: Norvasc 10 mg every evening, Metoprolol 100 mg BID  -HLD: Lipitor 40 mg qHS        2)Pulmonary: patient's cough is persistent and bothersome, especially in setting of recent chest / thoracic surgery. Had one episode in the ARU of coughing fit where he \"passed out\" from uncontrollable coughing, had ~3 episodes of this during acute stay. Discussed this with patient on 6/26 and 6/27. Likely vasovagal. Offered to allow patient to stay one more day if he felt uncomfortable with discharge. Patient expressed wishing to go home and thinks he will be safe.   -Pulmonary toilet - Continued EZPap QID with RT and duonebs q4h PRN. Discussed with patient, duonebs do not need to be continued a discharge. Can continue EXpap.  -Encourage incentive " "spirometry  -O2 PRN for comfort - weaned   -Cough: cepacol lozenge PRN, added delsym q12h PRN on 6/24. In setting of mucous production, also will add guaifenesin q6h PRN as of 6/25. Cough is thought to be due to irritation from left vocal fold paralysis (see below).        3)FENGI  -Diet: Regular consistency solids, thin liquids. Low salt and fat.  -Bowels: PRN Senokot-S, Miralax, Dulcolax suppository  -MVT daily   -Monitor potassium levels.  Continue with 20 mEq BID supplementation while on Lasix (see above).  Most recent potassium levels 4.0 on 6/21 and then 4.4 on 6/27. Mg 2.3 on 6/27. Recheck per PCP.        4)  -Post-operative urinary retention, now with Jenkins removed  -PVR x1 15 ml.   -Cr stable.  -Continue with Flomax 0.4 mg daily        5)DVT prophylaxis  -Mechanical only. Ambulating adequate distances to deter DVTs so no chemoprophylaxis, mechanical discontinued as well.         6)Pain  -Tylenol 325-650 mg q4h PRN        7)Endo  -Diabetes - HbA1c, 8.9 = newly diagnosed diabetes. Sliding scale insulin and diabetes education ordered. Intention was to likely start Metformin, however, blood glucose values were in 80s-110s. Blood glucoses normal to only slightly elevated. Did not start metformin, sliding scale insulin discontinued, glucose checks discontinued.  - PCP f/u as outpatient        8)Heme  -Hgb 10.4 on 6/19, recheck 6/21 was 11.1. BRB per rectum noted 6/20 with BM x 2. No hx of hemorrhoids. Since 6/21 no recurrence, hemoglobin stable/improved  -WBCs 12.9. Patient was not febrile, but temperatures had been in the 99 range, improving to more normal temps as of 6/21.  Planned to initiate full infectious workup if spiked fever. Clinical exam remained stable, unrevealing.        9)Psych: denied hx of anxiety or depression  -Atarax PRN for anxiety, sensation of \"choking\" - discontinued at discharge as patient was not using  -Patient agreeable to rehab health psych consult - placed 6/20  -Could consider " further meds if continued issues with anxiety and / or mood in the future  -Alternative medicine services recommended (I.e. Acupuncture, essential oils, etc. )  - following        10)?Ulnar neuropathy: patient noted diminished sensation in digits 3-5 on the R on 6/20 that had been occurring for several days. Educated on positioning. Patient denied this as an issue previously. Some R 5th digit weakness. Monitor. Tinel's negative.   -Could consider EMG/NCS as outpatient.         11)Social/Dispo  -Discharge home with family at a modified independent level for ambulation, mobility, and ADLs with the least restrictive assistive device.  -Rehab prognosis: Good  -Follow up appointments: PCP, CV surgery, cardiology, ENT, PM&R  -Code status: Full Code       12)Vocal fold paralysis  -ENT evaluated patient 6/25. Diagnosed patient with left true vocal fold paralysis. They recommend close follow up in 2-3 weeks with Dr. Alonso for repeat evaluation and consideration of vocal fold injection, possibly with a temporary agent while awaiting possible return of function.           DISCHARGE MEDICATIONS  Current Discharge Medication List      START taking these medications    Details   benzocaine-menthol (CEPACOL) 15-3.6 MG lozenge Place 2 lozenges inside cheek every hour as needed for sore throat  Qty: 18 lozenge, Refills: 0    Associated Diagnoses: Vocal cord paralysis      dextromethorphan (DELSYM) 30 MG/5ML liquid Take 5 mLs (30 mg) by mouth every 12 hours as needed for cough  Qty: 89 mL, Refills: 0    Associated Diagnoses: Vocal cord paralysis      guaiFENesin (ROBITUSSIN) 20 mg/mL SOLN solution Take 10 mLs (200 mg) by mouth every 6 hours as needed for cough  Qty: 118 mL, Refills: 0    Associated Diagnoses: Vocal cord paralysis      multivitamin w/minerals (THERA-VIT-M) tablet Take 1 tablet by mouth daily  Qty: 30 tablet, Refills: 0    Associated Diagnoses: S/P aortic aneurysm repair      polyethylene glycol  (MIRALAX/GLYCOLAX) packet Take 17 g by mouth daily as needed for constipation    Associated Diagnoses: S/P aortic aneurysm repair      potassium chloride ER (K-DUR/KLOR-CON M) 20 MEQ CR tablet Take 1 tablet (20 mEq) by mouth 2 times daily      senna-docusate (SENOKOT-S/PERICOLACE) 8.6-50 MG tablet Take 1 tablet by mouth 2 times daily as needed for constipation    Associated Diagnoses: S/P aortic aneurysm repair         CONTINUE these medications which have CHANGED    Details   acetaminophen (TYLENOL) 325 MG tablet Take 1-2 tablets (325-650 mg) by mouth every 4 hours as needed for mild pain or fever (> 101 F)    Associated Diagnoses: S/P aortic aneurysm repair      allopurinol (ZYLOPRIM) 100 MG tablet Take 1 tablet (100 mg) by mouth daily  Qty: 30 tablet, Refills: 0    Associated Diagnoses: Gout, unspecified cause, unspecified chronicity, unspecified site      amLODIPine (NORVASC) 10 MG tablet Take 1 tablet (10 mg) by mouth every evening  Qty: 30 tablet, Refills: 0    Associated Diagnoses: S/P aortic aneurysm repair      aspirin (ASA) 81 MG chewable tablet Take 1 tablet (81 mg) by mouth daily  Qty: 30 tablet, Refills: 0    Associated Diagnoses: S/P aortic aneurysm repair      atorvastatin (LIPITOR) 40 MG tablet Take 1 tablet (40 mg) by mouth At Bedtime  Qty: 30 tablet, Refills: 0    Associated Diagnoses: S/P aortic aneurysm repair      docusate sodium (COLACE) 100 MG tablet Take 1 tablet (100 mg) by mouth 2 times daily as needed for constipation    Associated Diagnoses: S/P aortic aneurysm repair      furosemide (LASIX) 40 MG tablet Take 1 tablet (40 mg) by mouth daily  Qty: 3 tablet, Refills: 0    Associated Diagnoses: S/P aortic aneurysm repair      metoprolol tartrate (LOPRESSOR) 100 MG tablet Take 1 tablet (100 mg) by mouth 2 times daily  Qty: 60 tablet, Refills: 0    Associated Diagnoses: S/P aortic aneurysm repair      potassium chloride ER (K-TAB) 20 MEQ CR tablet Take 1 tablet (20 mEq) by mouth 2 times  daily  Qty: 7 tablet, Refills: 0    Associated Diagnoses: S/P aortic aneurysm repair      tamsulosin (FLOMAX) 0.4 MG capsule Take 1 capsule (0.4 mg) by mouth daily  Qty: 30 capsule, Refills: 0    Associated Diagnoses: S/P aortic aneurysm repair               DISCHARGE INSTRUCTIONS AND FOLLOW UP  Discharge Procedure Orders   Cardiac Rehab Referral   Standing Status: Future   Referral Priority: Routine Referral Type: Rehab Therapy Cardiac Therapy   Number of Visits Requested: 1     Speech Therapy Referral   Standing Status: Future   Referral Priority: Routine Referral Type: Therapeutic Services   Number of Visits Requested: 1     Reason for your hospital stay   Order Comments: Rehab after surgery to regain function     Activity   Order Comments: Your activity upon discharge: activity as tolerated     Order Specific Question Answer Comments   Is discharge order? Yes      Adult [facility] Follow-up and recommended labs and tests   Order Comments: Follow up with primary care provider, Titi Griffin, within 7 days to evaluate medication change, to evaluate treatment change and to evaluate after surgery.  The following labs/tests are recommended: CBC and others per PCP.      Appointments on [place] and/or [facility] (with [facility] or [facility] provider or service). Call [phone] if you haven't heard regarding these appointments within 7 days of discharge.     Full Code     Order Specific Question Answer Comments   Code status determined by: Discussion with patient/legal decision maker      Diet   Order Comments: Follow this diet upon discharge: Orders Placed This Encounter      Room Service      Snacks/Supplements Adult: Boost Glucose Control; With Meals      Combination Diet Low Saturated Fat Na <2400mg Diet     Order Specific Question Answer Comments   Is discharge order? Yes         -- Follow up with ENT: We recommend close follow up in 2-3 weeks with Dr. Alonso for repeat evaluation and consideration of vocal  fold injection, possibly with a temporary agent while we await possible return of function.  You are scheduled to see Dr. Alonso on Thursday, July 11th at 2:00 PM. Please bring a copy of your insurance card and photo ID with you to your appointment.     Address           [facility] [place]                          [address]                          [address]                          [place] [address]  Phone              [phone]     -- Follow up with [facility] aortic center  You are scheduled on July 10th at 10:30 AM.     Address           [facility] Surgery [facility] - Vascular Surgery     Jul 10, 2019 10:30 AM CDT  Cardiac Evaluation with  Cardiac Rehab 2  [facility], [place], Cardiac Rehabilitation ([facility], [facility])     Phone              [phone]     -- Follow up appointment with Primary Care Physician within 7 days of discharge from ARU  You are scheduled to see Dr. Griffin on Tuesday, July 2nd at 1010 AM.     Address           [address]                           [place] [address]  Phone              [phone]  Fax                  [phone]     -- Follow-up with cardiology within 1 week.  You are scheduled to see Barron Butts PA-C, on Tuesday, July 2nd at 1:00 PM.     Address           [facility]                          [address]                          [place] [address]  Phone              [phone]    -- Follow with CV Surgery for your 6 week post-op  You are scheduled to see Dr. Sandoval on Monday, August 5th at 3:10 pm.     Address           [facility]                          [address]                          [place] [address]  Phone              [phone]      PHYSICAL EXAMINATION    Most recent Vital Signs:   Vitals:    06/26/19 2058 06/27/19  "0618 06/27/19 0635 06/27/19 0845   BP: 114/57   110/58   BP Location:    Right arm   Pulse: 90      Resp:    18   Temp:    97.4  F (36.3  C)   TempSrc:    Oral   SpO2:   97% 99%   Weight:  108.5 kg (239 lb 3.2 oz)     Height:           General: NAD, pleasant and cooperative, resting sitting up in bed  HEENT: NC/AT. MMM.  Pulmonary: Non-labored breathing, good breath sounds and air movement, CTA b/l, no w/r/r   Cardiovascular: RRR, no m/r/g.  Cardiac incision healing well, c/d/i.  Abdominal: Soft, ND, BS+  Lower Extremities: non-pitting edema b/l, no calf tenderness b/l  MSK/neuro:               Mental Status:  alert and oriented                Cranial Nerves: grossly normal II-XII   Sensory: Normal to light touch in bilateral upper and lower extremities, except for diminished sensation \"numbness\" of R digits 3-5   Strength:   Shoulder abd     EF         WE        EE                Finger abd  R          5    5     4+        5  5 5 with index finger, 3 with pinky finger   L           5    5     4+        5     5 5             *Exam limited by cardiac precautions, but very good strength                      HF         KE         DF         EHL      PF   R           5           5           5           5           5  L           5           5           5           5           5  Speech: Hoarse, whispered at times                   Discharge summary was forwarded to Titi Griffin (PCP) at the time of discharge, so as to bridge from hospital to outpatient care.     It was our pleasure to care for Yadiel Roth during this hospitalization. Please do not hesitate to contact me should there be questions regarding the hospital course or discharge plan.        Melina Escobar DO  PGY2 Physical Medicine & Rehabilitation    I, Dr. Schmitt, have seen and examined the patient. He is now reasy to be discharged home.  I have reviewed the above resident's note and agree with content.  Total time: 25 min    DAVID Schmitt MD PhD    "

## 2019-06-26 NOTE — PLAN OF CARE
2 Minute Walk Test (2MWT): 2MWT assesses endurance, aerobic capacity, and gait by measuring walking distance over 2 minutes for individuals who are able to ambulate without physical assistance.    Gait assistive device used: none (should be consistent)    Patient score 398  feet.     Normative Values for Older Adults (Claude & Chip, 2009)  Mean (SD) 2MWT for [facility] dwelling older adults; 150.4 feet (23.1) meters  Mean (SD) 2MWT for long term care group (LTC); 77.5 feet (25.6) meters   Minimal Detectable Change (calculated MDC) =  40 ft or 12.2 meters (90% confidence)  Excellent test-retest reliability (ICC = 0.95)  Excelllent concurrent validity between 2MWT and [place] Balance Scale (r = 0.88)   Assessment (rationale for performing, application to patient s function & care plan): determine baseline activity tolerance prior to CR and to assist in building HEP.  Minutes billed as physical performance test: 10

## 2019-06-26 NOTE — PLAN OF CARE
"PT: Pt had a quick episode of lightheaded/ \"passing out\" while having his BP checked while he was sitting EOB.   Pt had been coughing at the time, and he later reported this happened in acute care x 3 times while he was coughing.  Pt was assisted to supine with hob raised, vitals taken.  Pt's BP high right after incident 166/87, HR 95, then 119/87, HR 81  after resting 5 minutes.  RN notified, and RN was going to update MD. -20 minutes , pt wanting to rest after this.   "

## 2019-06-26 NOTE — PLAN OF CARE
FOCUS/GOAL  Bladder management, Medication management, Pain management, Wound care management and Mobility    ASSESSMENT, INTERVENTIONS AND CONTINUING PLAN FOR GOAL:  Pt is alert and oriented x4, denies pain except when coughing because of chest incision, denies SOB or nausea. Pt noted to be using nasal cannula for very short time at start of shift after having a coughing incident per pt report. Pt requested and given cepacol lozenges after having a coughing fit from throat irritation. Pt cont of bladder using toilet. No BM so far this shift. Chest drain sites beginning to scab over, pt educated on wound cares for the sites, but informed that the sites most likely will not need to be dressed after scabbing forms and drainage decreases. Pt ind in room for mobility.

## 2019-06-26 NOTE — PROGRESS NOTES
SPIRITUAL HEALTH SERVICES  SPIRITUAL ASSESSMENT Progress Note  [facility] ([facility] ARU     REFERRAL SOURCE: communion visit -  referral    I visited Yadiel and gave him Muslim communion. He had trouble speaking during the visit.    PLAN: Unit  will follow up    Luke Whittington   Intern  Pager [phone]

## 2019-06-26 NOTE — PROGRESS NOTES
"CLINICAL NUTRITION SERVICES - REASSESSMENT NOTE     Nutrition Prescription    RECOMMENDATIONS FOR MDs/PROVIDERS TO ORDER:  -None today    Malnutrition Status:    -Pt does not meet 2 criteria    Recommendations already ordered by Registered Dietitian (RD):  -Continue Boost Glucose Control with lunch.    Future/Additional Recommendations:  -Encourage intakes of tid meals including protein sources + supplements.  -Monitor po intakes and weight trends.  Follow up for supplement acceptance and adjust as indicated.    -Continue to reinforce diet education until discharge.  Consider outpatient RD follow up in the future due to new dx DM.       EVALUATION OF THE PROGRESS TOWARD GOALS   Diet:  Low Saturated Fat, Na <2400 mg  Boost Glucose Control vanilla with lunch daily  Room Service with Assist    Intake: PO intakes have been % of most recorded meals (50% x 1)  over the past week.  Per pt he has been drinking 1 Boost Glucose Control per day-pile up of ~5 supplements noted at bedside (previously receiving bid).  Pt states eating about 75% of meals. He has been ordering smaller meals tid per [facility] review.      NEW FINDINGS   Weight today (6/26):  110.7 kg/244 lbs  Previous weight (6/25)  115.6 kg/254 lbs 14.4 oz  [facility] admission weight (6/17):  120.6 kg/265 lbs 14.4 oz.  Per chart review weight (3/29/19) 121.3 kg/267 lbs 7 oz.   Varying weight since [facility] admission, ? Accuracy of today's weight.      Lasix daily  Potassium Chloride bid  MVI with minerals daily    Per chart review:   \"Diabetes - HbA1c, 8.9 = newly diagnosed diabetes. Sliding scale insulin and diabetes education ordered. Intention was to likely start Metformin, however, blood glucose values have been in 80s-110s. Blood glucoses normal - only slightly elevated. Will not start metformin, sliding scale insulin discontinued, glucose checks discontinued.  - PCP f/u as outpatient\"     Pt was seen by RD on 6/22 per provider consult and consistent " "carbohydrate, heart healthy diet education was reviewed and handouts provided.    Per SLP note this am:  \"Pt continues with regular diet with thin liquids- is tolerating with use of safe swallow strategies- slow rate, small bites/sips but on occasional does have some intermittent coughing but pt has this at baseline without po - SLP has been continuing to monitor closely d/t this.\"    \"ENT report from 6/25/19 revealed Left true vocal fold paralysis, no urgent intervention was recommended as Pt is not having dysphagia or aspiration, however Pt is still at risk for aspiration.\"  Please see SLP note for further details.      MALNUTRITION  % Intake: decreased intakes does not meet criteria  % Weight Loss: Unable to assess (on diuretics, but also likely some true loss with decreased po since hospitalization.    Subcutaneous Fat Loss: None observed  Muscle Loss: None observed  Fluid Accumulation/Edema:  Unable to determine (but likely not nutrition related)  Malnutrition Diagnosis:  Pt does not meet 2 criteria for dx malnutrition.    Previous Goals   Patient to consume % of nutritionally adequate meal trays TID, or the equivalent with supplements/snacks.  Evaluation: Met-if pt report of drinking 1 supplement per day is accurate.      Previous Nutrition Diagnosis  Inadequate oral intake related to decreased appetite as evidenced by pt report and review of meal orders show insufficient to meet assessed needs.  Evaluation: Improving/updated below    CURRENT NUTRITION DIAGNOSIS  Predicted suboptimal nutrient intake (energy/protein) related to decreased appetite post op, ordering small meals, but willing to drink 1 supplement per day AEB pt report and review of meal selections.      INTERVENTIONS  Implementation  Nutrition Education:  Encouraged intakes at meals including protein sources and continued use of supplement as needed.  Encouraged pt to follow up with outpatient RD in future if additional education desired for " new dx DM.      Goals  Patient to consume % of nutritionally adequate meal trays TID, or the equivalent with supplements/snacks.    Monitoring/Evaluation  Progress toward goals will be monitored and evaluated per protocol.    Debbie Verdugo RD, LD

## 2019-06-26 NOTE — PLAN OF CARE
Speech Language Therapy Discharge Summary    Reason for therapy discharge:    Discharged to home with outpatient therapy.    Progress towards therapy goal(s). See goals on Care Plan in [facility] electronic health record for goal details.  Goals partially met.  Barriers to achieving goals:   discharge from facility.    Therapy recommendation(s):  Swallowing/Nutrition: Pt continues with regular diet with thin liquids- is tolerating with use of safe swallow strategies- slow rate, small bites/sips but on occasional does have some intermittent coughing but pt has this at baseline without po - SLP has been continuing to monitor closely d/t this.  Cognition/Language:  Pt's voice remains very dysphonic, ENT report from 6/25/19 revealed Left true vocal fold paralysis, no urgent intervention was recommended as Pt is not having dysphagia or aspiration, however Pt is still at risk for aspiration.  May have repeat evaluation in 2-3 weeks with consideration of vocal fold injection.  Pt's other language skills are intact; problem solving and reasoning intact based on higher level formal assessment. Pt with mild memory impairments based on standardized assessment however has been making progress with use of memory strategies and currently reports is baseline so no further intervention warranted. Recommend OP sptx following discharge (for voice and swallowing).

## 2019-06-26 NOTE — PLAN OF CARE
FOCUS/GOAL  Bowel management, Bladder management and Pain management    ASSESSMENT, INTERVENTIONS AND CONTINUING PLAN FOR GOAL:  Patient slept well on the overnight, he denies pain or sob. Independent in the room with walker. Continent of bowel and bladder. Call light within reach.

## 2019-06-26 NOTE — PLAN OF CARE
Physical Therapy Discharge Summary    Reason for therapy discharge:    All goals and outcomes met, no further needs identified.    Progress towards therapy goal(s). See goals on Care Plan in [facility] electronic health record for goal details.  Goals met    Therapy recommendation(s):    Continued therapy is recommended.  Rationale/Recommendations:  OP cardiac rehab to continue progressing activity tolerance toward PLOF.    Pt has met all PT goals and is IND with all mobility including gait ~x1000' at a time, sit<>stands, car transfers, and 21 stairs with single rail to access apartment. Pt remains limited by poor endurance to continue being addressed with OP CR. No DME needs at discharge. Pt has been set up on home exercise program for LE strengthening, walking, [facility]

## 2019-06-26 NOTE — PROGRESS NOTES
[facility]   Acute Rehabilitation Unit  Daily progress note    INTERVAL HISTORY  No acute events overnight. This afternoon patient was participating in a therapy session and had a coughing episode. He was sitting at the EOB and kind of drifted backwards while coughing, assisted by therapist to lying in bed.  The patient notes that this is happened a couple times during his acute hospitalization as well, but not during rehab.  He felt okay.  Blood pressure and heart rate were slightly elevated at first, but vitals to be taken within 10 minutes and had normalized.  Patient feeling all right.  The patient endorses that the cough medicines as needed have been somewhat helpful.  He is slated to see ENT in follow-up a couple weeks after discharge from [facility].  When talking to the patient about discharge tomorrow, he reported looking forward to getting back to normal.  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  He was informed to provide work-related papers for signature to the providers in the acute rehab unit or to his primary care provider as an outpatient.  Discussed medications as well as follow-up appointments with patient.    MEDICATIONS  Scheduled:    allopurinol  100 mg Oral Daily     amLODIPine  10 mg Oral QPM     aspirin  81 mg Oral Daily     atorvastatin  40 mg Oral At Bedtime     furosemide  40 mg Oral Daily     metoprolol tartrate  100 mg Oral BID     multivitamin w/minerals  1 tablet Oral Daily     potassium chloride ER  20 mEq Oral BID     tamsulosin  0.4 mg Oral Daily        PRN:  acetaminophen, sore throat lozenge, bisacodyl, dextromethorphan, guaiFENesin, hydrOXYzine, ipratropium - albuterol 0.5 mg/2.5 mg/3 mL, ondansetron, polyethylene glycol, senna-docusate       PHYSICAL EXAM  Patient Vitals for the past 24 hrs:   BP Temp Temp src Pulse Heart Rate Resp SpO2 Weight   06/26/19 1630 122/62 98.5  F (36.9  C) Oral 80 100 16  97 % --   06/26/19 1547 119/67 -- -- 81 -- -- 97 % --   06/26/19 1540 166/87 -- -- 95 -- -- -- --   06/26/19 1049 98/55 -- -- -- 76 -- -- --   06/26/19 0900 -- -- -- -- -- -- 96 % --   06/26/19 0836 112/44 98.2  F (36.8  C) Oral 94 -- 16 96 % --   06/26/19 0648 -- -- -- -- -- -- -- 110.7 kg (244 lb)   06/25/19 2047 125/58 -- -- -- 84 -- -- --       GEN: NAD, pleasant and cooperative, sitting up in bed   HEENT: NC/AT, EOMI, voice quality hoarse / whispered -slightly improved since admission.  CVS: RRR  PULM: no increased WOB, minimal cough during visit  ABD: Soft, ND  EXT: moves all extremities spontaneously  Neuro: Answers appropriately, follows commands, ind in room with mobility    LABS  CBC RESULTS:   Recent Labs   Lab Test 06/18/19  0622   WBC 11.9*   RBC 3.61*   HGB 10.7*   HCT 33.3*   MCV 92   MCH 29.6   MCHC 32.1   RDW 14.4          Last Comprehensive Metabolic Panel:  Sodium   Date Value Ref Range Status   06/21/2019 136 133 - 144 mmol/L Final     Potassium   Date Value Ref Range Status   06/21/2019 4.0 3.4 - 5.3 mmol/L Final     Chloride   Date Value Ref Range Status   06/21/2019 105 94 - 109 mmol/L Final     Carbon Dioxide   Date Value Ref Range Status   06/21/2019 26 20 - 32 mmol/L Final     Anion Gap   Date Value Ref Range Status   06/21/2019 5 3 - 14 mmol/L Final     Glucose   Date Value Ref Range Status   06/21/2019 104 (H) 70 - 99 mg/dL Final     Urea Nitrogen   Date Value Ref Range Status   06/21/2019 14 7 - 30 mg/dL Final     Creatinine   Date Value Ref Range Status   06/21/2019 0.93 0.66 - 1.25 mg/dL Final     GFR Estimate   Date Value Ref Range Status   06/21/2019 >90 >60 mL/min/[1.73_m2] Final     Comment:     Non  GFR Calc  Starting 12/18/2018, serum creatinine based estimated GFR (eGFR) will be   calculated using the Chronic Kidney Disease Epidemiology Collaboration   (CKD-EPI) equation.       Calcium   Date Value Ref Range Status   06/21/2019 8.4 (L) 8.5 - 10.1 mg/dL  Final       Recent Labs   Lab 06/22/19  0729 06/21/19  2111 06/21/19  1718 06/21/19  1215 06/21/19  1040 06/21/19  0745 06/20/19  2105   GLC  --   --   --   --  104*  --   --    * 102* 92 86  --  96 115*     Lab Results   Component Value Date    A1C 8.9 06/18/2019         ASSESSMENT  Yadiel Roth is a 49 year old male with a PMH including but not limited to HTN, gout, LVH, carpal tunnel syndrome, prediabetes, morbid obesity, and an ascending aortic aneurysm who presented to [facility] on 6/10/19 for a planned repair of ascending aortic aneurysm and aortic arch.  Hospital course complicated by post-operative pain, hypoxia requiring supplemental oxygen, and urinary retention requiring temporary Jenkins catheter.      Impairment group code: 09 Cardiac: s/p repair of ascending aorta aneurysm and aortic arch      PLAN  Rehabilitation  -Continue with inpatient rehabilitation including PT, OT, SLP, rehab nursing, social work, and close monitoring by physiatry  -The patient has impairments in strength, balance, and endurance, which are leading to activity limitations in ambulation, mobility, ADLs, and functional communication      Medical  1)CVS  -s/p repair of ascending aorta aneurysm and aortic arch               -ASA 81 mg daily               -Cardiac precautions - No lifting >10 lbs, minimal overhead reaching, no extreme arm movements                      -Wash incisions daily with soap and water               -Daily weights               -Lasix 40 mg daily and potassium supplementation 20 mEq BID for 2 weeks each (last dose 6/30).  Monitor kidney function and electrolytes (last Cr 0.93 on 6/21, recheck 6/27)               -CV surgery appointment 7/2/19 at 1:00 pm ([facility])               -Cardiology appointment 8/5? (will need to confirm) at [facility]               -Cardiac rehab after discharge  -HTN: Norvasc 10 mg every evening, Metoprolol 100 mg BID  -HLD: Lipitor 40 mg  qHS       2)Pulmonary: patient's cough is bothersome, especially in setting of recent chest / thoracic surgery.  -Pulmonary toilet - Continue EZPap QID with RT and duonebs q4h PRN. Discussed no duonebs at discharge with patient, who was agreeable.   -Encourage incentive spirometry  -O2 PRN for comfort - encouraging wean as no home O2, patient has been weaning  -cepacol lozenge PRN for cough, added delsym q12h PRN on 6/24 - patient says help. In setting of mucous production, also will add guaifenesin q6h PRN as of 6/25.  -ENT evaluated patient 6/25. Diagnosed patient with left true vocal fold paralysis. They recommend close follow up in 2-3 weeks with Dr. Alonso for repeat evaluation and consideration of vocal fold injection, possibly with a temporary agent while awaiting possible return of function.        3)FENGI  -Diet: Regular consistency solids, thin liquids.  Low salt and fat.  -Bowels: PRN Senokot-S, Miralax, Dulcolax suppository  -Zofran PRN  -MVT daily   -Monitor potassium levels.  Continue with 20 mEq BID supplementation while on Lasix (see above).  Most recent potassium levels 4.0, next check 6/27.        4)  -Post-operative urinary retention, now with Jenkins removed  -PVR x1 15 ml.   -Cr stable.  -Continue with Flomax 0.4 mg daily        5)DVT prophylaxis  -Mechanical only.  Ambulating adequate distances to deter DVTs so no chemoprophylaxis.        6)Pain  -Tylenol 325-650 mg q4h PRN       7)Endo  -Diabetes - HbA1c, 8.9 = newly diagnosed diabetes. Sliding scale insulin and diabetes education ordered. Intention was to likely start Metformin, however, blood glucose values have been in 80s-110s. Blood glucoses normal - only slightly elevated. Will not start metformin, sliding scale insulin discontinued, glucose checks discontinued.  - PCP f/u as outpatient       8)Heme  -Hgb 10.4 on 6/19, recheck 6/21 was 11.1. BRB per rectum noted 6/20 with BM x 2. No hx of hemorrhoids.  6/21 no recurrence hemoglobin  "stable/improved  -WBCs 12.9. Patient has not been febrile, but temperatures had been in the 99 range, improving to more normalcy 6/21.  Would initiate full infectious workup if spikes fever.       9)Psych: denies hx of anxiety or depression  -Atarax PRN for anxiety, sensation of \"choking\"  -Patient agreeable to rehab health psych consult - placed 6/20  -Could consider further meds if continued issues with anxiety and / or mood  -Alternative medicine services recommended (I.e. Acupuncture, essential oils, etc. )  - following        10)?Ulnar neuropathy: patient noted diminished sensation in digits 3-5 on the R on 6/20 that had been occurring for several days. Educated on positioning. Patient denied this as an issue previously. Some R 5th digit weakness. Monitor. Could consider EMG/NCS.      11)Social/Dispo  -Anticipate discharge home at a modified independent level for ambulation, mobility, and ADLs with the least restrictive assistive device  -ELOS: 6/27  -Rehab prognosis: Good  -Follow up appointments: PCP, CV surgery, cardiology, ENT, PM&R     Code status: Full Code      Melina Escobar DO  PGY2 Department of Rehabilitation Medicine      "

## 2019-06-26 NOTE — PLAN OF CARE
Problem: Goal/Outcome  Goal: Goal Outcome Summary  Outcome: No Change  Note:   FOCUS/GOAL  Medical management, Discharge planning and Mobility    ASSESSMENT, INTERVENTIONS AND CONTINUING PLAN FOR GOAL:  Pt's vitals stable this morning but noted that BP low again by noon. Encouraged pt to drink fluids and provided at beside. Sternal incision CONCETTA and healing well. Chest tube sites, covered wih dressing CDI. Denied any pain in post surgical area. Pt independent in his room without assistive device. Pt still gets SOB with activity. Prn neb given x 2 byt RT this shift and pt said it was really helpful. He is using his incentive spirometer and acapella. Voice still hoarse and low. Prn cepazol lozenge given for throat irritation. Prn dextromethorphan and prn guaifenesin given for non productive cough with good effect. Pt discharging home tomorrow.

## 2019-06-27 ENCOUNTER — RECORDS - HEALTHEAST (OUTPATIENT)
Dept: ADMINISTRATIVE | Facility: OTHER | Age: 49
End: 2019-06-27

## 2019-06-27 VITALS
BODY MASS INDEX: 35.43 KG/M2 | OXYGEN SATURATION: 99 % | TEMPERATURE: 97.4 F | HEART RATE: 90 BPM | DIASTOLIC BLOOD PRESSURE: 58 MMHG | HEIGHT: 69 IN | WEIGHT: 239.2 LBS | SYSTOLIC BLOOD PRESSURE: 110 MMHG | RESPIRATION RATE: 18 BRPM

## 2019-06-27 LAB
MAGNESIUM SERPL-MCNC: 2.3 MG/DL (ref 1.6–2.3)
POTASSIUM SERPL-SCNC: 4.4 MMOL/L (ref 3.4–5.3)

## 2019-06-27 PROCEDURE — 83735 ASSAY OF MAGNESIUM: CPT | Performed by: STUDENT IN AN ORGANIZED HEALTH CARE EDUCATION/TRAINING PROGRAM

## 2019-06-27 PROCEDURE — 25000132 ZZH RX MED GY IP 250 OP 250 PS 637: Performed by: PHYSICAL MEDICINE & REHABILITATION

## 2019-06-27 PROCEDURE — 36415 COLL VENOUS BLD VENIPUNCTURE: CPT | Performed by: STUDENT IN AN ORGANIZED HEALTH CARE EDUCATION/TRAINING PROGRAM

## 2019-06-27 PROCEDURE — 84132 ASSAY OF SERUM POTASSIUM: CPT | Performed by: STUDENT IN AN ORGANIZED HEALTH CARE EDUCATION/TRAINING PROGRAM

## 2019-06-27 PROCEDURE — 25000132 ZZH RX MED GY IP 250 OP 250 PS 637: Performed by: STUDENT IN AN ORGANIZED HEALTH CARE EDUCATION/TRAINING PROGRAM

## 2019-06-27 RX ADMIN — POTASSIUM CHLORIDE 20 MEQ: 20 TABLET, EXTENDED RELEASE ORAL at 11:20

## 2019-06-27 RX ADMIN — BENZOCAINE, MENTHOL 2 LOZENGE: 15; 3.6 LOZENGE ORAL at 03:36

## 2019-06-27 RX ADMIN — METOPROLOL TARTRATE 100 MG: 50 TABLET, FILM COATED ORAL at 08:42

## 2019-06-27 RX ADMIN — TAMSULOSIN HYDROCHLORIDE 0.4 MG: 0.4 CAPSULE ORAL at 08:42

## 2019-06-27 RX ADMIN — ASPIRIN 81 MG CHEWABLE TABLET 81 MG: 81 TABLET CHEWABLE at 08:42

## 2019-06-27 RX ADMIN — DEXTROMETHORPHAN 30 MG: 30 SUSPENSION, EXTENDED RELEASE ORAL at 03:35

## 2019-06-27 RX ADMIN — FUROSEMIDE 40 MG: 20 TABLET ORAL at 08:42

## 2019-06-27 RX ADMIN — BENZOCAINE, MENTHOL 2 LOZENGE: 15; 3.6 LOZENGE ORAL at 11:20

## 2019-06-27 RX ADMIN — BENZOCAINE, MENTHOL 2 LOZENGE: 15; 3.6 LOZENGE ORAL at 08:46

## 2019-06-27 RX ADMIN — MULTIPLE VITAMINS W/ MINERALS TAB 1 TABLET: TAB at 08:42

## 2019-06-27 RX ADMIN — ALLOPURINOL 100 MG: 100 TABLET ORAL at 08:42

## 2019-06-27 ASSESSMENT — MIFFLIN-ST. JEOR: SCORE: 1935.38

## 2019-06-27 NOTE — PLAN OF CARE
FOCUS/GOAL  Wound care management and Medical management    ASSESSMENT, INTERVENTIONS AND CONTINUING PLAN FOR GOAL:  Pt alert and oriented x4, continues to have cough and raspy/soft voice. Utilizing PRN lozenge, delsym, and robitussen. Encouraged to continue I.S. and acapella. Last BM 6/26 and voiding with no difficulty. Independent in room with no assistive device. Educated on wound care and given extra dressings as he is discharging tomorrow. Nurse cleansed wound and he applied new dressing. No further lightheaded episodes this shift and Dr. Escobar was updated. Educated patient to try to sit if having coughing episode.

## 2019-06-27 NOTE — PLAN OF CARE
FOCUS/GOAL  Bowel management, Bladder management, Pain management and Cognition/Memory/Judgment/Problem solving    ASSESSMENT, INTERVENTIONS AND CONTINUING PLAN FOR GOAL:  Pt is alert and oriented, denied pain, new changes in sensation, dizziness, lightheadedness, reported SOB and was put on 1 Lpm NC and given prn cough medications which were effective, pt taken off O2 in am, mod I in room, dressing change supplies in room, no further care concerns at this time continue with discharge plan.

## 2019-06-27 NOTE — DISCHARGE INSTRUCTIONS
Follow up appointments:    -- Follow up appointment with Primary Care Physician within 7 days of discharge from ARU  You are scheduled to see Dr. Griffin on Tuesday, July 2nd at 1010 AM.    Address  [facility]                          [address] [place] [address]  Phone   [phone]  Fax                  [phone]    -- Follow-up with cardiology within 1 week.  You are scheduled to see Barron Butts PA-C, on Tuesday, July 2nd at 1:00 PM.    Address  [facility]                          [address]                          [place] [address]  Phone   [phone]    -- Follow up with [facility] aortic [facility]  You are scheduled on July 10th at 10:30 AM.    -- Follow up with ENT: We recommend close follow up in 2-3 weeks with Dr. Alonso for repeat evaluation and consideration of vocal fold injection, possibly with a temporary agent while we await possible return of function.  You are scheduled to see Dr. Alonso on Thursday, July 11th at 2:00 PM. Please bring a copy of your insurance card and photo ID with you to your appointment.    Address  [facility]                          [address]                          [address]                          [place] [address]  Phone   [phone]    -- Follow with CV Surgery for your 6 week post-op  You are scheduled to see Dr. Sandoval on Monday, August 5th at 3:10 pm.    Address  [facility]                          [address]                          [place] [address]  Phone   [phone]

## 2019-06-27 NOTE — PROGRESS NOTES
SPIRITUAL HEALTH SERVICES  SPIRITUAL ASSESSMENT Progress Note  [facility]     REFERRAL SOURCE: Unit     Pt has requested Communion. Another  brought him Communion yesterday. I told him that I came to bring him Communion but as I went to my pocket for the Hosts, I realized I did not have Communion with me. I apologized to him. He said he was being discharged today. We joined together in prayer. I told him I would come back if I was able. Unfortunately, given my schedule I will not be able to make it back.    PLAN: Spiritual Health Services remains available for patient's ongoing support.    Payam Driver M.Div.     Pager [phone]

## 2019-06-27 NOTE — PLAN OF CARE
Patient A/Ox4, denies any pain this am, except when coughing but denies having any significant coughing spells on this shift.  Patient's BP was slightly low but still okay to receive AM BP meds and has not had any dizziness or SOB.  Discharge information was reviewed with patient and meds given from discharge pharm.  Patient will go home with family ride, f/u appointments reviewed.

## 2019-07-02 ENCOUNTER — OFFICE VISIT - HEALTHEAST (OUTPATIENT)
Dept: FAMILY MEDICINE | Facility: CLINIC | Age: 49
End: 2019-07-02

## 2019-07-02 ENCOUNTER — OFFICE VISIT - HEALTHEAST (OUTPATIENT)
Dept: CARDIOLOGY | Facility: CLINIC | Age: 49
End: 2019-07-02

## 2019-07-02 DIAGNOSIS — E11.69 TYPE 2 DIABETES MELLITUS WITH OTHER SPECIFIED COMPLICATION, WITHOUT LONG-TERM CURRENT USE OF INSULIN (H): ICD-10-CM

## 2019-07-02 DIAGNOSIS — Z86.79 S/P ASCENDING AORTIC ANEURYSM REPAIR: ICD-10-CM

## 2019-07-02 DIAGNOSIS — Z12.11 SCREEN FOR COLON CANCER: ICD-10-CM

## 2019-07-02 DIAGNOSIS — M50.30 DEGENERATIVE CERVICAL DISC: ICD-10-CM

## 2019-07-02 DIAGNOSIS — G43.909 MIGRAINE WITHOUT STATUS MIGRAINOSUS, NOT INTRACTABLE, UNSPECIFIED MIGRAINE TYPE: ICD-10-CM

## 2019-07-02 DIAGNOSIS — J38.00 VOCAL CORD PARALYSIS: ICD-10-CM

## 2019-07-02 DIAGNOSIS — I71.21 THORACIC ASCENDING AORTIC ANEURYSM (H): ICD-10-CM

## 2019-07-02 DIAGNOSIS — Z98.890 S/P ASCENDING AORTIC ANEURYSM REPAIR: ICD-10-CM

## 2019-07-02 DIAGNOSIS — I10 HYPERTENSION, UNSPECIFIED TYPE: ICD-10-CM

## 2019-07-02 ASSESSMENT — MIFFLIN-ST. JEOR: SCORE: 1930.37

## 2019-07-05 ENCOUNTER — COMMUNICATION - HEALTHEAST (OUTPATIENT)
Dept: PHARMACY | Facility: CLINIC | Age: 49
End: 2019-07-05

## 2019-07-09 ENCOUNTER — HOSPITAL ENCOUNTER (OUTPATIENT)
Dept: CARDIAC REHAB | Facility: CLINIC | Age: 49
End: 2019-07-09
Attending: PHYSICAL MEDICINE & REHABILITATION
Payer: COMMERCIAL

## 2019-07-09 ENCOUNTER — COMMUNICATION - HEALTHEAST (OUTPATIENT)
Dept: FAMILY MEDICINE | Facility: CLINIC | Age: 49
End: 2019-07-09

## 2019-07-09 DIAGNOSIS — Z98.890 S/P AORTIC ANEURYSM REPAIR: ICD-10-CM

## 2019-07-09 DIAGNOSIS — Z86.79 S/P AORTIC ANEURYSM REPAIR: ICD-10-CM

## 2019-07-09 PROCEDURE — 40000116 ZZH STATISTIC OP CR VISIT

## 2019-07-09 PROCEDURE — 93798 PHYS/QHP OP CAR RHAB W/ECG: CPT

## 2019-07-09 PROCEDURE — 93797 PHYS/QHP OP CAR RHAB WO ECG: CPT | Mod: 59

## 2019-07-09 PROCEDURE — 40000575 ZZH STATISTIC OP CARDIAC VISIT #2

## 2019-07-17 ENCOUNTER — HOSPITAL ENCOUNTER (OUTPATIENT)
Dept: CARDIAC REHAB | Facility: CLINIC | Age: 49
End: 2019-07-17
Attending: PHYSICAL MEDICINE & REHABILITATION
Payer: COMMERCIAL

## 2019-07-17 PROCEDURE — 40000575 ZZH STATISTIC OP CARDIAC VISIT #2

## 2019-07-17 PROCEDURE — 93798 PHYS/QHP OP CAR RHAB W/ECG: CPT

## 2019-07-17 PROCEDURE — 93797 PHYS/QHP OP CAR RHAB WO ECG: CPT | Mod: 59

## 2019-07-17 PROCEDURE — 40000116 ZZH STATISTIC OP CR VISIT

## 2019-07-19 ENCOUNTER — HOSPITAL ENCOUNTER (OUTPATIENT)
Dept: CARDIAC REHAB | Facility: CLINIC | Age: 49
End: 2019-07-19
Attending: PHYSICAL MEDICINE & REHABILITATION
Payer: COMMERCIAL

## 2019-07-19 PROCEDURE — 40000116 ZZH STATISTIC OP CR VISIT

## 2019-07-19 PROCEDURE — 93798 PHYS/QHP OP CAR RHAB W/ECG: CPT

## 2019-07-22 ENCOUNTER — HOSPITAL ENCOUNTER (OUTPATIENT)
Dept: CARDIAC REHAB | Facility: CLINIC | Age: 49
End: 2019-07-22
Attending: PHYSICAL MEDICINE & REHABILITATION
Payer: COMMERCIAL

## 2019-07-22 PROCEDURE — 93798 PHYS/QHP OP CAR RHAB W/ECG: CPT | Performed by: OCCUPATIONAL THERAPIST

## 2019-07-22 PROCEDURE — 40000116 ZZH STATISTIC OP CR VISIT: Performed by: OCCUPATIONAL THERAPIST

## 2019-07-24 ENCOUNTER — HOSPITAL ENCOUNTER (OUTPATIENT)
Dept: CARDIAC REHAB | Facility: CLINIC | Age: 49
End: 2019-07-24
Attending: PHYSICAL MEDICINE & REHABILITATION
Payer: COMMERCIAL

## 2019-07-24 PROCEDURE — 40000116 ZZH STATISTIC OP CR VISIT

## 2019-07-24 PROCEDURE — 93798 PHYS/QHP OP CAR RHAB W/ECG: CPT

## 2019-07-31 ENCOUNTER — HOSPITAL ENCOUNTER (OUTPATIENT)
Dept: CARDIAC REHAB | Facility: CLINIC | Age: 49
End: 2019-07-31
Attending: PHYSICAL MEDICINE & REHABILITATION
Payer: COMMERCIAL

## 2019-07-31 PROCEDURE — 93798 PHYS/QHP OP CAR RHAB W/ECG: CPT

## 2019-07-31 PROCEDURE — 40000116 ZZH STATISTIC OP CR VISIT

## 2019-08-01 ENCOUNTER — HOSPITAL ENCOUNTER (OUTPATIENT)
Dept: SPEECH THERAPY | Facility: CLINIC | Age: 49
Setting detail: THERAPIES SERIES
End: 2019-08-01
Attending: OTOLARYNGOLOGY
Payer: COMMERCIAL

## 2019-08-01 PROCEDURE — 92524 BEHAVRAL QUALIT ANALYS VOICE: CPT | Mod: GN | Performed by: STUDENT IN AN ORGANIZED HEALTH CARE EDUCATION/TRAINING PROGRAM

## 2019-08-02 ENCOUNTER — HOSPITAL ENCOUNTER (OUTPATIENT)
Dept: CARDIAC REHAB | Facility: CLINIC | Age: 49
End: 2019-08-02
Attending: PHYSICAL MEDICINE & REHABILITATION
Payer: COMMERCIAL

## 2019-08-02 PROCEDURE — 40000116 ZZH STATISTIC OP CR VISIT

## 2019-08-02 PROCEDURE — 93798 PHYS/QHP OP CAR RHAB W/ECG: CPT

## 2019-08-05 ENCOUNTER — HOSPITAL ENCOUNTER (OUTPATIENT)
Dept: CARDIAC REHAB | Facility: CLINIC | Age: 49
End: 2019-08-05
Attending: PHYSICAL MEDICINE & REHABILITATION
Payer: COMMERCIAL

## 2019-08-05 PROCEDURE — 93798 PHYS/QHP OP CAR RHAB W/ECG: CPT | Performed by: OCCUPATIONAL THERAPIST

## 2019-08-05 PROCEDURE — 40000116 ZZH STATISTIC OP CR VISIT: Performed by: OCCUPATIONAL THERAPIST

## 2019-08-07 ENCOUNTER — COMMUNICATION - HEALTHEAST (OUTPATIENT)
Dept: FAMILY MEDICINE | Facility: CLINIC | Age: 49
End: 2019-08-07

## 2019-08-07 ENCOUNTER — HOSPITAL ENCOUNTER (OUTPATIENT)
Dept: CARDIAC REHAB | Facility: CLINIC | Age: 49
End: 2019-08-07
Attending: PHYSICAL MEDICINE & REHABILITATION
Payer: COMMERCIAL

## 2019-08-07 PROCEDURE — 93798 PHYS/QHP OP CAR RHAB W/ECG: CPT

## 2019-08-07 PROCEDURE — 40000116 ZZH STATISTIC OP CR VISIT

## 2019-08-07 NOTE — ADDENDUM NOTE
Encounter addended by: Alpers, Allison E, SLP on: 8/7/2019 10:22 AM   Actions taken: Sign clinical note, Flowsheet accepted

## 2019-08-07 NOTE — PROGRESS NOTES
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   Surgical/Medical history reviewed Yes   Pertinent History Of Current Problem 48yo male presenting with dysphonia secondary to left vocal fold paralysis s/p ascending aortic arch aneurysm repair on 6/10/19.  Pt was in the ARU from 6/17/19-6/27/19, during which time he received SLP services for post-operative aphasia.  Language skills have since returned to baseline.  Pt continues to struggle with dysphonia, noting that his voice rapidly fatigues with use.  He uses a straw to drink thin liquids so he doesn't choke.  He has sporadic and random spells of dry coughing, not always related to swallowing.  In the ARU, pt had some episodes of coughing to the point of passing out, but he reports that this has not happened since he was discharged home.  He reports that he is considering undergoing vocal fold augmentation with ENT, but is concerned about potential side effects.  PMH is complicated, please refer to chart.   Prior Level of Functioning No previous problems.   Patient Role/employment History Employed  (Works in customer service)   General Observations Pt reports that his voice is the best it has been since the surgery, noting that his voice today has been typical for the past week.   Patient/family Goals To improve his voice as much as possible, to be able to return to work   Personal Rating / Voice Use Rating   Describe the amount of voice use required for your job Nearly constant    Describe the intensity of voice use required for your job Moderate   Describe the amount of typical non-work voice use Moderate   Describe the intensity of typical non-work voice use Moderate   Comments Heavy vocal demands at work, almost exclusively taking place over the phone.  People sometimes have difficulty hearing him because of his voice, and always have difficulty hearing him in background noise.  Voice problems cause him to lose income, as he is currently unable to work because of his voice problems.  He sometimes restricts his social activities and feels left out of conversations because of his voice.  He sometimes strains to produce voice, and voice quality is sometimes unpredictable.  Voice problems are sometimes upsetting and cause him to feel handicapped.  People occasionally ask him about what's wrong with his voice.  VHI-10: 23/40.   Evaluation Results   Voice Observations VISIBLE TENSION: neck.   Voice Profile during conversation, 1 min monologue and paragraph reading   Voice Quality Breathy;Rough;Phonation gap   Voice quality comments SPEECH: Consistent moderate-severe breathiness, consistent moderate roughness, and consistent moderate strain with aphonic breaks that become more frequent with voice use and intermittent mild diplophonia.  SINGING: consistent breathiness with intermittent roughness and bursts of a stronger voice quality.  THERAPY PROBES: modest improvement in voice quality with diaphragmatic engagement and glottic closure probes, especially in combination with head turn to the right.   Voice quality severity rating continuum (1=Severe, 7=WNL) 3  (CAPE-V Overall Severity: 71/100)   Breath control Weak;Tight   Breath Control comments Inspirations for speech are shallow and frequent, occurring every 4-5 words in running speech.   Voice Use / Effort Pinched / squeezed larynx;Contraction of neck muscles;Throat push   Voice Use / Effort comments Pt rates his current phonatory effort for  speech as 6-7/10 (10 is maximum effort).   Voice use / Effort severity rating continuum (1=Severe, 7=WNL) 4   Fundamental frequency (Hz)   (Centered around B2)   Pitch /Frequency Description Diplophonia   Pitch / Frequency comments Intermittent mild diplophonia in conversational speech.  Limited pitch range with increased dysphonia at low and high pitches.   Pitch / Frequency severity rating continuum (1=Severe, 7=WNL) 5   Volume Too quiet   Volume comments Volume for conversational speech is reduced but adequate for the setting (1:1 conversation in quiet room).  A whisper is normal.  Attempts at soft phonation are aphonic.  Loud phonation is consistent in quality with conversational speech, with limited volume increase.   Volume severity rating continuum (1=Severe, 7=WNL) 4   Neuromuscular Control Other    Neuromuscular Control severity rating continuum (1=Severe, 7=WNL) 6   Neuromuscular comments Left vocal fold paralysis, see adduction/abduction function measure below   Resonance WNL   Resonance severity rating continuum (1=Severe, 7=WNL) 7   Comments Moderate-severe dysphonia characterized by breathiness, roughness, strain, aphonic breaks, diplophonia, shallow inspirations with poor respiratory/phonatory coordination, reduced volume, reduced pitch range, and increased phonatory effort with tight laryngeal musculature and neck involvement during phonation.   Adduction /Abduction Function   Laryngeal diadokinetic speed   (Slow)   Laryngeal diadokinetic strength Weak;Sluggish  (Strained)   Laryngeal diadokinetic consistency Run together   Adduction / Abduction function comments Abnormal secondary to left vocal fold paralysis and poor glottic closure   Adduction / Abduction function scale Age 11 - 65, norm per sec:  5+   Vibratory Function of Vocal Folds   Prolonged 'ah' at mid pitch (sec) 2.8 seconds at ~Bb2 with diplophonia and roughness   Prolonged 'ah' at high pitch (sec) 1.4 seconds at B2 with diplophonia and  "roughness   Prolonged 'ah' at low pitch (sec) 1.6 seconds at C#3, briefly clearer then rough and diplophonic   Vibratory Function of Vocal Folds Scale Males 20 - 80 yrs: 15 - 25 secs   Vibratory Function of Vocal Folds Comments Significantly reduced secondary to left vocal fold paralysis and poor glottic closure   Mucosal Function of the Vocal Folds   S/Z ratio (__ sec/ __sec = __percentage) 5.6  (>1.4 is suggestive of laryngeal pathology)   Unilateral Larynx Function   Alternating head turns to right and left Easier phonation to right;Less dysphonic to right   Function of Lengtheners / Shorteners (CT and TA Muscles)   Pitch glides Limited range;Upper pitches more dysphonic;Lower pitches more dysphonic  (Lowest: ~G2; Highest: Bb3)   Respiratory Function Screening   Longest prolonged \"S\" from S/Z ratio (#of sec) 9.6 seconds   Longest prolonged \"S\" characteristics Respiratory weakness   Videostroboscopy / Endoscopy   Other observations Laryngoscopy completed by ENT on 6/25/19 significant for left vocal fold paralysis.   General Therapy Interventions   Planned Therapy Interventions Voice   Voice Larynx strengthening;Larynx and TVF flexibility;Larynx movement/coordination;Voice quality/pitch or volume tasks;Resonant voice techniques   Impressions and Recommendations   Communication Diagnosis Dysphonia, left vocal fold paralysis   Summary Mr. Roth presents with moderate-severe dysphonia characterized by breathiness, roughness, strain, aphonic breaks, diplophonia, shallow inspirations with poor respiratory/phonatory coordination, reduced volume, reduced pitch range, and increased phonatory effort with tight laryngeal musculature and neck involvement during phonation.  Based on today's evaluation and previous laryngoscopy with ENT, dysphonia is primarily accounted for by the left vocal fold paralysis and resulting poor glottic closure.  Patient is currently unable to work d/t his voice problems.   Recommendations A " course of skilled speech therapy is recommended in order to optimize vocal technique in the context of left vocal fold paralysis, improve voice quality, promote improved laryngeal strength, flexibility, and coordination, and promote reduced laryngeal effort, fatigue, and irritation so that patient is able to meet his extensive vocal demands at work and at home.   Frequency and Duration 1x/week for 7 weeks with 2-3 monthly follow-ups   Prognosis  Good with intervention   Prognosis comments Pt will likely receive the most benefit from a combination of skilled speech therapy and medical/surgical intervention with ENT.   Risks and Benefits of Treatment have been explained. Yes   Patient & /or Caregiver  in agreement with plan of care Yes   Patient Education SLP provided education regarding vocal fold paralysis causes, symptoms, and treatment options.  Pt verbalized understanding.   Educational Assessment   Barriers to Learning No barriers   Preferred Learning Style Listening;Reading;Demonstration;Pictures / Video   Voice Goals   Voice Goals 1;2;3;4   Voice Goal 1   Goal Identifier Generalization   Goal Description Patient will report a week of typical activities in which dysphonia and effort do not exceed a level of 3 out of 10, 80% of the time, so that patient is able to meet his vocal demands at work and at home.   Target Date 02/01/20   Voice Goal 2   Goal Identifier Voice quality   Goal Description In a 20-minute speech task, patient will demonstrate roughness, breathiness, strain, diplophonia, and aphonia that do not exceed a level of 3 out of 10, 80% of the time by SLP judgment, so that patient is able to meet his voice quality demands.   Target Date 02/01/20   Voice Goal 3   Goal Identifier Vocal function   Goal Description In order to improve laryngeal strength, flexibility, and coordination for daily vocal tasks, patient will extend average maximum phonation time in exercise 4 of modified Vocal Function  Exercises to 10 seconds without strain when given min assist.   Target Date 02/01/20   Voice Goal 4   Goal Identifier Hygiene   Goal Description Patient will learn, demonstrate, and implement use of 2-3 vocal hygiene strategies (e.g. hydration, alternative cough strategies), to promote reduced laryngeal irritation.    Target Date 11/01/19   Total Session Time   Voice Minutes (91565) 50   Total Evaluation Time 50     Thank you for the referral of this patient.    Allison Alpers, B.A. (nicholas), M.A., CCC-SLP  Speech-Language Pathologist  Certificate of Vocology  [facility]  [phone]

## 2019-08-09 ENCOUNTER — HOSPITAL ENCOUNTER (OUTPATIENT)
Dept: CARDIAC REHAB | Facility: CLINIC | Age: 49
End: 2019-08-09
Attending: PHYSICAL MEDICINE & REHABILITATION
Payer: COMMERCIAL

## 2019-08-09 PROCEDURE — 93798 PHYS/QHP OP CAR RHAB W/ECG: CPT

## 2019-08-09 PROCEDURE — 40000116 ZZH STATISTIC OP CR VISIT

## 2019-08-12 ENCOUNTER — HOSPITAL ENCOUNTER (OUTPATIENT)
Dept: CARDIAC REHAB | Facility: CLINIC | Age: 49
End: 2019-08-12
Attending: PHYSICAL MEDICINE & REHABILITATION
Payer: COMMERCIAL

## 2019-08-12 PROCEDURE — 93798 PHYS/QHP OP CAR RHAB W/ECG: CPT

## 2019-08-12 PROCEDURE — 40000116 ZZH STATISTIC OP CR VISIT

## 2019-08-19 ENCOUNTER — HOSPITAL ENCOUNTER (OUTPATIENT)
Dept: CARDIAC REHAB | Facility: CLINIC | Age: 49
End: 2019-08-19
Attending: PHYSICAL MEDICINE & REHABILITATION
Payer: COMMERCIAL

## 2019-08-19 PROCEDURE — 93798 PHYS/QHP OP CAR RHAB W/ECG: CPT | Performed by: OCCUPATIONAL THERAPIST

## 2019-08-19 PROCEDURE — 40000116 ZZH STATISTIC OP CR VISIT: Performed by: OCCUPATIONAL THERAPIST

## 2019-08-20 ENCOUNTER — HOSPITAL ENCOUNTER (OUTPATIENT)
Dept: SPEECH THERAPY | Facility: CLINIC | Age: 49
Setting detail: THERAPIES SERIES
End: 2019-08-20
Attending: PHYSICAL MEDICINE & REHABILITATION
Payer: COMMERCIAL

## 2019-08-20 PROCEDURE — 92507 TX SP LANG VOICE COMM INDIV: CPT | Mod: GN | Performed by: STUDENT IN AN ORGANIZED HEALTH CARE EDUCATION/TRAINING PROGRAM

## 2019-08-21 ENCOUNTER — OFFICE VISIT - HEALTHEAST (OUTPATIENT)
Dept: FAMILY MEDICINE | Facility: CLINIC | Age: 49
End: 2019-08-21

## 2019-08-21 ENCOUNTER — HOSPITAL ENCOUNTER (OUTPATIENT)
Dept: CARDIAC REHAB | Facility: CLINIC | Age: 49
End: 2019-08-21
Attending: PHYSICAL MEDICINE & REHABILITATION
Payer: COMMERCIAL

## 2019-08-21 DIAGNOSIS — J38.00 VOCAL CORD PARALYSIS: ICD-10-CM

## 2019-08-21 DIAGNOSIS — Z12.11 SCREEN FOR COLON CANCER: ICD-10-CM

## 2019-08-21 PROCEDURE — 40000116 ZZH STATISTIC OP CR VISIT

## 2019-08-21 PROCEDURE — 93798 PHYS/QHP OP CAR RHAB W/ECG: CPT

## 2019-08-22 ENCOUNTER — OFFICE VISIT - HEALTHEAST (OUTPATIENT)
Dept: CARDIOLOGY | Facility: CLINIC | Age: 49
End: 2019-08-22

## 2019-08-22 DIAGNOSIS — I10 ESSENTIAL HYPERTENSION: ICD-10-CM

## 2019-08-22 DIAGNOSIS — J38.00 VOCAL CORD PARALYSIS: ICD-10-CM

## 2019-08-22 DIAGNOSIS — Z98.890 S/P THORACIC AORTIC ANEURYSM REPAIR: ICD-10-CM

## 2019-08-22 DIAGNOSIS — Z86.79 S/P THORACIC AORTIC ANEURYSM REPAIR: ICD-10-CM

## 2019-08-22 DIAGNOSIS — I71.23 DESCENDING THORACIC AORTIC ANEURYSM (H): ICD-10-CM

## 2019-08-22 DIAGNOSIS — E78.00 HYPERCHOLESTEROLEMIA: ICD-10-CM

## 2019-08-22 ASSESSMENT — MIFFLIN-ST. JEOR: SCORE: 1956.23

## 2019-08-23 ENCOUNTER — HOSPITAL ENCOUNTER (OUTPATIENT)
Dept: CARDIAC REHAB | Facility: CLINIC | Age: 49
End: 2019-08-23
Attending: PHYSICAL MEDICINE & REHABILITATION
Payer: COMMERCIAL

## 2019-08-23 PROCEDURE — 40000116 ZZH STATISTIC OP CR VISIT

## 2019-08-23 PROCEDURE — 93798 PHYS/QHP OP CAR RHAB W/ECG: CPT

## 2019-08-28 ENCOUNTER — HOSPITAL ENCOUNTER (OUTPATIENT)
Dept: CARDIAC REHAB | Facility: CLINIC | Age: 49
End: 2019-08-28
Attending: PHYSICAL MEDICINE & REHABILITATION
Payer: COMMERCIAL

## 2019-08-28 PROCEDURE — 40000116 ZZH STATISTIC OP CR VISIT

## 2019-08-28 PROCEDURE — 93798 PHYS/QHP OP CAR RHAB W/ECG: CPT

## 2019-08-29 ENCOUNTER — COMMUNICATION - HEALTHEAST (OUTPATIENT)
Dept: ADMINISTRATIVE | Facility: CLINIC | Age: 49
End: 2019-08-29

## 2019-09-01 ENCOUNTER — COMMUNICATION - HEALTHEAST (OUTPATIENT)
Dept: FAMILY MEDICINE | Facility: CLINIC | Age: 49
End: 2019-09-01

## 2019-09-01 DIAGNOSIS — I10 HYPERTENSION, UNSPECIFIED TYPE: ICD-10-CM

## 2019-09-04 ENCOUNTER — HOSPITAL ENCOUNTER (OUTPATIENT)
Dept: CARDIAC REHAB | Facility: CLINIC | Age: 49
End: 2019-09-04
Attending: PHYSICAL MEDICINE & REHABILITATION
Payer: COMMERCIAL

## 2019-09-04 PROCEDURE — 40000116 ZZH STATISTIC OP CR VISIT

## 2019-09-04 PROCEDURE — 93798 PHYS/QHP OP CAR RHAB W/ECG: CPT

## 2019-09-06 ENCOUNTER — HOSPITAL ENCOUNTER (OUTPATIENT)
Dept: CARDIAC REHAB | Facility: CLINIC | Age: 49
End: 2019-09-06
Attending: PHYSICAL MEDICINE & REHABILITATION
Payer: COMMERCIAL

## 2019-09-06 PROCEDURE — 40000116 ZZH STATISTIC OP CR VISIT

## 2019-09-06 PROCEDURE — 93798 PHYS/QHP OP CAR RHAB W/ECG: CPT

## 2019-09-10 ENCOUNTER — HOSPITAL ENCOUNTER (OUTPATIENT)
Dept: SPEECH THERAPY | Facility: CLINIC | Age: 49
Setting detail: THERAPIES SERIES
End: 2019-09-10
Attending: OTOLARYNGOLOGY
Payer: COMMERCIAL

## 2019-09-10 PROCEDURE — 92507 TX SP LANG VOICE COMM INDIV: CPT | Mod: GN | Performed by: STUDENT IN AN ORGANIZED HEALTH CARE EDUCATION/TRAINING PROGRAM

## 2019-09-11 ENCOUNTER — HOSPITAL ENCOUNTER (OUTPATIENT)
Dept: CARDIAC REHAB | Facility: CLINIC | Age: 49
End: 2019-09-11
Attending: PHYSICAL MEDICINE & REHABILITATION
Payer: COMMERCIAL

## 2019-09-11 PROCEDURE — 40000116 ZZH STATISTIC OP CR VISIT

## 2019-09-11 PROCEDURE — 93798 PHYS/QHP OP CAR RHAB W/ECG: CPT

## 2019-09-18 ENCOUNTER — COMMUNICATION - HEALTHEAST (OUTPATIENT)
Dept: FAMILY MEDICINE | Facility: CLINIC | Age: 49
End: 2019-09-18

## 2019-09-18 ENCOUNTER — OFFICE VISIT - HEALTHEAST (OUTPATIENT)
Dept: FAMILY MEDICINE | Facility: CLINIC | Age: 49
End: 2019-09-18

## 2019-09-18 DIAGNOSIS — Z12.11 SCREEN FOR COLON CANCER: ICD-10-CM

## 2019-09-18 DIAGNOSIS — I10 ESSENTIAL HYPERTENSION: ICD-10-CM

## 2019-09-18 DIAGNOSIS — R05.9 COUGH: ICD-10-CM

## 2019-09-18 DIAGNOSIS — J38.00 VOCAL CORD PARALYSIS: ICD-10-CM

## 2019-09-18 DIAGNOSIS — E11.69 TYPE 2 DIABETES MELLITUS WITH OTHER SPECIFIED COMPLICATION, WITHOUT LONG-TERM CURRENT USE OF INSULIN (H): ICD-10-CM

## 2019-09-18 DIAGNOSIS — Z71.85 VACCINE COUNSELING: ICD-10-CM

## 2019-09-18 DIAGNOSIS — I71.21 THORACIC ASCENDING AORTIC ANEURYSM (H): ICD-10-CM

## 2019-09-18 LAB
ANION GAP SERPL CALCULATED.3IONS-SCNC: 10 MMOL/L (ref 5–18)
BUN SERPL-MCNC: 14 MG/DL (ref 8–22)
CALCIUM SERPL-MCNC: 9.6 MG/DL (ref 8.5–10.5)
CHLORIDE BLD-SCNC: 108 MMOL/L (ref 98–107)
CHOLEST SERPL-MCNC: 184 MG/DL
CO2 SERPL-SCNC: 23 MMOL/L (ref 22–31)
CREAT SERPL-MCNC: 1.16 MG/DL (ref 0.7–1.3)
CREAT UR-MCNC: 188 MG/DL
FASTING STATUS PATIENT QL REPORTED: YES
GFR SERPL CREATININE-BSD FRML MDRD: >60 ML/MIN/1.73M2
GLUCOSE BLD-MCNC: 98 MG/DL (ref 70–125)
HBA1C MFR BLD: 6.3 % (ref 3.5–6)
HDLC SERPL-MCNC: 38 MG/DL
LDLC SERPL CALC-MCNC: 101 MG/DL
MICROALBUMIN UR-MCNC: 1.59 MG/DL (ref 0–1.99)
MICROALBUMIN/CREAT UR: 8.5 MG/G
POTASSIUM BLD-SCNC: 4.2 MMOL/L (ref 3.5–5)
SODIUM SERPL-SCNC: 141 MMOL/L (ref 136–145)
TRIGL SERPL-MCNC: 223 MG/DL

## 2019-09-24 ENCOUNTER — COMMUNICATION - HEALTHEAST (OUTPATIENT)
Dept: FAMILY MEDICINE | Facility: CLINIC | Age: 49
End: 2019-09-24

## 2019-09-25 ENCOUNTER — HOSPITAL ENCOUNTER (OUTPATIENT)
Dept: CARDIAC REHAB | Facility: CLINIC | Age: 49
End: 2019-09-25
Attending: PHYSICAL MEDICINE & REHABILITATION
Payer: COMMERCIAL

## 2019-09-25 PROCEDURE — 40000575 ZZH STATISTIC OP CARDIAC VISIT #2

## 2019-09-25 PROCEDURE — 93797 PHYS/QHP OP CAR RHAB WO ECG: CPT | Mod: 59

## 2019-09-25 PROCEDURE — 40000116 ZZH STATISTIC OP CR VISIT

## 2019-09-25 PROCEDURE — 93798 PHYS/QHP OP CAR RHAB W/ECG: CPT

## 2019-10-07 ENCOUNTER — OFFICE VISIT - HEALTHEAST (OUTPATIENT)
Dept: FAMILY MEDICINE | Facility: CLINIC | Age: 49
End: 2019-10-07

## 2019-10-07 DIAGNOSIS — Z12.11 SCREEN FOR COLON CANCER: ICD-10-CM

## 2019-10-07 DIAGNOSIS — I71.21 THORACIC ASCENDING AORTIC ANEURYSM (H): ICD-10-CM

## 2019-10-07 DIAGNOSIS — I10 ESSENTIAL HYPERTENSION: ICD-10-CM

## 2019-10-07 DIAGNOSIS — J38.00 VOCAL CORD PARALYSIS: ICD-10-CM

## 2019-10-09 ENCOUNTER — HOSPITAL ENCOUNTER (OUTPATIENT)
Dept: SPEECH THERAPY | Facility: CLINIC | Age: 49
Setting detail: THERAPIES SERIES
End: 2019-10-09
Attending: OTOLARYNGOLOGY
Payer: COMMERCIAL

## 2019-10-09 PROCEDURE — 92507 TX SP LANG VOICE COMM INDIV: CPT | Mod: GN | Performed by: STUDENT IN AN ORGANIZED HEALTH CARE EDUCATION/TRAINING PROGRAM

## 2019-10-30 ENCOUNTER — OFFICE VISIT - HEALTHEAST (OUTPATIENT)
Dept: FAMILY MEDICINE | Facility: CLINIC | Age: 49
End: 2019-10-30

## 2019-10-30 DIAGNOSIS — J06.9 UPPER RESPIRATORY TRACT INFECTION, UNSPECIFIED TYPE: ICD-10-CM

## 2019-10-30 DIAGNOSIS — J38.00 VOCAL CORD PARALYSIS: ICD-10-CM

## 2019-10-30 DIAGNOSIS — I10 ESSENTIAL HYPERTENSION: ICD-10-CM

## 2019-10-30 DIAGNOSIS — I71.21 THORACIC ASCENDING AORTIC ANEURYSM (H): ICD-10-CM

## 2019-11-05 ENCOUNTER — HOSPITAL ENCOUNTER (OUTPATIENT)
Dept: SPEECH THERAPY | Facility: CLINIC | Age: 49
Setting detail: THERAPIES SERIES
End: 2019-11-05
Attending: OTOLARYNGOLOGY
Payer: COMMERCIAL

## 2019-11-05 PROCEDURE — 92507 TX SP LANG VOICE COMM INDIV: CPT | Mod: GN | Performed by: STUDENT IN AN ORGANIZED HEALTH CARE EDUCATION/TRAINING PROGRAM

## 2019-11-14 ENCOUNTER — COMMUNICATION - HEALTHEAST (OUTPATIENT)
Dept: FAMILY MEDICINE | Facility: CLINIC | Age: 49
End: 2019-11-14

## 2019-11-15 ENCOUNTER — AMBULATORY - HEALTHEAST (OUTPATIENT)
Dept: FAMILY MEDICINE | Facility: CLINIC | Age: 49
End: 2019-11-15

## 2019-11-15 DIAGNOSIS — J38.00 VOCAL CORD PARALYSIS: ICD-10-CM

## 2019-11-23 ENCOUNTER — COMMUNICATION - HEALTHEAST (OUTPATIENT)
Dept: FAMILY MEDICINE | Facility: CLINIC | Age: 49
End: 2019-11-23

## 2019-11-23 DIAGNOSIS — M10.9 GOUT: ICD-10-CM

## 2019-11-26 ENCOUNTER — HOSPITAL ENCOUNTER (OUTPATIENT)
Dept: SPEECH THERAPY | Facility: CLINIC | Age: 49
Setting detail: THERAPIES SERIES
End: 2019-11-26
Attending: OTOLARYNGOLOGY
Payer: COMMERCIAL

## 2019-11-26 ENCOUNTER — COMMUNICATION - HEALTHEAST (OUTPATIENT)
Dept: FAMILY MEDICINE | Facility: CLINIC | Age: 49
End: 2019-11-26

## 2019-11-26 PROCEDURE — 92507 TX SP LANG VOICE COMM INDIV: CPT | Mod: GN | Performed by: STUDENT IN AN ORGANIZED HEALTH CARE EDUCATION/TRAINING PROGRAM

## 2019-11-26 NOTE — PROGRESS NOTES
Outpatient Speech Language Pathology Progress Note     Patient: Yadiel Roth  : 1970    Beginning/End Dates of Reporting Period:  2019 to 2019; 5 therapy sessions since evaluation    Referring Provider: Drew Valiente MD    Therapy Diagnosis: Dysphonia, Left vocal fold paralysis    Client Self Report: Patient reports that he has scheduled an ENT consult for 12/3/19 to discuss potential medical/surgical interventions.  He notes no significant improvements in his voice and cough symptoms.     Objective Measurements:      No coughing observed during today's session.  With use of voice techniques, pt demonstrating voice quality characterized by consistent moderate-severe breathiness, frequent intermittent mild-moderate roughness and strain, and intermittent brief aphonic breaks.  Volume continues to be moderately reduced.    Given min-mod cues/models from SLP to use previously trained voice techniques, pt read aloud 4-6 sentence paragraphs and participated in conversation with SLP about his current voice/cough symptoms with voice quality as noted above.  SLP answered pt's questions about the thyroplasty procedure, and pt verbalized understanding.  SLP reviewed preivously trained techniques to improve topical hydration, in order to reduce laryngeal irritation from thickened secretions.  Pt verbalized understanding.        Goals:  Goal Identifier Generalization   Goal Description Patient will report a week of typical activities in which dysphonia and effort do not exceed a level of 3 out of 10, 80% of the time, so that patient is able to meet his vocal demands at work and at home.   Target Date 20   Date Met      Progress:  Goal not met per patient report and objective measures above.     Goal Identifier Voice quality   Goal Description In a 20-minute speech task, patient will demonstrate roughness, breathiness, strain, diplophonia, and aphonia that do not exceed a level of 3 out of 10, 80%  of the time by SLP judgment, so that patient is able to meet his voice quality demands.   Target Date 02/01/20   Date Met      Progress:  Minimal progress d/t likely continued left vocal fold paralysis per objective measures noted above.     Goal Identifier Vocal function   Goal Description In order to improve laryngeal strength, flexibility, and coordination for daily vocal tasks, patient will extend average maximum phonation time in exercise 4 of modified Vocal Function Exercises to 10 seconds without strain when given min assist.   Target Date 02/01/20   Date Met      Progress: Goal not targeted this reporting period.  Pt unable to achieve consistent enough phonation using semi-occluded vocal tract techniques in order to complete these exercises.     Goal Identifier Hygiene   Goal Description Patient will learn, demonstrate, and implement use of 2-3 vocal hygiene strategies (e.g. hydration, alternative cough strategies), to promote reduced laryngeal irritation.    Target Date 11/01/19   Date Met      Progress: Progressing, not met.  Patient regularly implements alternative cough strategies during coughing fits at home per report, but notes that strategies are not reliably helpful in reducing the cough.  Patient benefits from frequent review of hydration and alternative cough techniques in session.     Progress Toward Goals:    Progress limited due to likely continued left vocal fold paralysis and limited glottal closure, even with therapy techniques.  Patient has demonstrated some modest improvements in voice quality, specifically reductions in roughness and strain, with use of the techniques trained in therapy; however, patient has persistent moderate-severe strain, aphonic breaks, and reduced vocal loudness, likely secondary to continued poor glottal closure from left vocal fold paralysis.  Patient continues to benefit from at least min-mod cues from SLP to implement the voice techniques into a variety of  speaking tasks.  Patient also reports experiencing significant vocal fatigue, and increased coughing with extended voice use.  Coughing fits will last 5-10 minutes on average, but can last for up to 15 minutes, and occasionally result in vomiting and loss of consciousness.  Patient is unable to meet the extensive speaking demands required of his job due to his continued dysphonia and severe coughing episodes.  Recommend continued disability leave (return to work date not yet known), continued speech therapy per current POC, and ENT consult for potential medical/surgical interventions.       Plan:  Continue therapy per current plan of care.  Other: ENT consult for potential medical/surgical interventions, currently scheduled for 12/3/19.    Discharge:  No      Allison Alpers, B.A. (music), M.A., CCC-SLP  Speech-Language Pathologist  Certificate of Vocology  [facility]  [phone]

## 2019-12-03 ENCOUNTER — OFFICE VISIT - HEALTHEAST (OUTPATIENT)
Dept: OTOLARYNGOLOGY | Facility: CLINIC | Age: 49
End: 2019-12-03

## 2019-12-03 DIAGNOSIS — J38.00 VOCAL CORD PARALYSIS: ICD-10-CM

## 2019-12-04 ENCOUNTER — OFFICE VISIT - HEALTHEAST (OUTPATIENT)
Dept: FAMILY MEDICINE | Facility: CLINIC | Age: 49
End: 2019-12-04

## 2019-12-04 DIAGNOSIS — I10 ESSENTIAL HYPERTENSION: ICD-10-CM

## 2019-12-04 DIAGNOSIS — J38.00 VOCAL CORD PARALYSIS: ICD-10-CM

## 2019-12-05 ENCOUNTER — COMMUNICATION - HEALTHEAST (OUTPATIENT)
Dept: OTOLARYNGOLOGY | Facility: CLINIC | Age: 49
End: 2019-12-05

## 2019-12-19 ENCOUNTER — OFFICE VISIT - HEALTHEAST (OUTPATIENT)
Dept: CARDIOLOGY | Facility: CLINIC | Age: 49
End: 2019-12-19

## 2019-12-19 DIAGNOSIS — I71.21 THORACIC ASCENDING AORTIC ANEURYSM (H): ICD-10-CM

## 2019-12-19 DIAGNOSIS — Z86.79 HISTORY OF REPAIR OF THORACIC AORTIC ANEURYSM: ICD-10-CM

## 2019-12-19 DIAGNOSIS — Z98.890 HISTORY OF REPAIR OF THORACIC AORTIC ANEURYSM: ICD-10-CM

## 2019-12-19 DIAGNOSIS — J38.00 VOCAL CORD PARALYSIS: ICD-10-CM

## 2019-12-19 ASSESSMENT — MIFFLIN-ST. JEOR: SCORE: 2039.24

## 2019-12-20 ENCOUNTER — COMMUNICATION - HEALTHEAST (OUTPATIENT)
Dept: PULMONOLOGY | Facility: OTHER | Age: 49
End: 2019-12-20

## 2019-12-20 ENCOUNTER — AMBULATORY - HEALTHEAST (OUTPATIENT)
Dept: PULMONOLOGY | Facility: OTHER | Age: 49
End: 2019-12-20

## 2019-12-20 DIAGNOSIS — R06.00 DYSPNEA: ICD-10-CM

## 2019-12-24 ENCOUNTER — COMMUNICATION - HEALTHEAST (OUTPATIENT)
Dept: FAMILY MEDICINE | Facility: CLINIC | Age: 49
End: 2019-12-24

## 2019-12-27 ENCOUNTER — OFFICE VISIT - HEALTHEAST (OUTPATIENT)
Dept: FAMILY MEDICINE | Facility: CLINIC | Age: 49
End: 2019-12-27

## 2019-12-27 DIAGNOSIS — Z86.79 HISTORY OF REPAIR OF THORACIC AORTIC ANEURYSM: ICD-10-CM

## 2019-12-27 DIAGNOSIS — Z98.890 HISTORY OF REPAIR OF THORACIC AORTIC ANEURYSM: ICD-10-CM

## 2019-12-27 DIAGNOSIS — J38.00 VOCAL CORD PARALYSIS: ICD-10-CM

## 2019-12-27 DIAGNOSIS — I10 ESSENTIAL HYPERTENSION: ICD-10-CM

## 2019-12-27 DIAGNOSIS — I71.21 THORACIC ASCENDING AORTIC ANEURYSM (H): ICD-10-CM

## 2019-12-27 DIAGNOSIS — E11.69 TYPE 2 DIABETES MELLITUS WITH OTHER SPECIFIED COMPLICATION, WITHOUT LONG-TERM CURRENT USE OF INSULIN (H): ICD-10-CM

## 2019-12-27 DIAGNOSIS — Z01.818 PREOP EXAMINATION: ICD-10-CM

## 2019-12-27 LAB
ALBUMIN SERPL-MCNC: 3.7 G/DL (ref 3.5–5)
ALP SERPL-CCNC: 94 U/L (ref 45–120)
ALT SERPL W P-5'-P-CCNC: 23 U/L (ref 0–45)
ANION GAP SERPL CALCULATED.3IONS-SCNC: 11 MMOL/L (ref 5–18)
AST SERPL W P-5'-P-CCNC: 19 U/L (ref 0–40)
BILIRUB SERPL-MCNC: 0.3 MG/DL (ref 0–1)
BUN SERPL-MCNC: 12 MG/DL (ref 8–22)
CALCIUM SERPL-MCNC: 9.1 MG/DL (ref 8.5–10.5)
CHLORIDE BLD-SCNC: 109 MMOL/L (ref 98–107)
CHOLEST SERPL-MCNC: 207 MG/DL
CO2 SERPL-SCNC: 22 MMOL/L (ref 22–31)
CREAT SERPL-MCNC: 1.36 MG/DL (ref 0.7–1.3)
ERYTHROCYTE [DISTWIDTH] IN BLOOD BY AUTOMATED COUNT: 15 % (ref 11–14.5)
FASTING STATUS PATIENT QL REPORTED: YES
GFR SERPL CREATININE-BSD FRML MDRD: 55 ML/MIN/1.73M2
GLUCOSE BLD-MCNC: 103 MG/DL (ref 70–125)
HBA1C MFR BLD: 6.1 % (ref 3.5–6)
HCT VFR BLD AUTO: 44.2 % (ref 40–54)
HDLC SERPL-MCNC: 29 MG/DL
HGB BLD-MCNC: 14.5 G/DL (ref 14–18)
LDLC SERPL CALC-MCNC: 134 MG/DL
MCH RBC QN AUTO: 29 PG (ref 27–34)
MCHC RBC AUTO-ENTMCNC: 32.8 G/DL (ref 32–36)
MCV RBC AUTO: 89 FL (ref 80–100)
PLATELET # BLD AUTO: 269 THOU/UL (ref 140–440)
PMV BLD AUTO: 6.8 FL (ref 7–10)
POTASSIUM BLD-SCNC: 4.1 MMOL/L (ref 3.5–5)
PROT SERPL-MCNC: 7.4 G/DL (ref 6–8)
RBC # BLD AUTO: 4.99 MILL/UL (ref 4.4–6.2)
SODIUM SERPL-SCNC: 142 MMOL/L (ref 136–145)
TRIGL SERPL-MCNC: 219 MG/DL
WBC: 4.3 THOU/UL (ref 4–11)

## 2019-12-27 ASSESSMENT — MIFFLIN-ST. JEOR: SCORE: 2020.3

## 2019-12-30 ENCOUNTER — COMMUNICATION - HEALTHEAST (OUTPATIENT)
Dept: FAMILY MEDICINE | Facility: CLINIC | Age: 49
End: 2019-12-30

## 2019-12-30 ENCOUNTER — ANESTHESIA - HEALTHEAST (OUTPATIENT)
Dept: SURGERY | Facility: CLINIC | Age: 49
End: 2019-12-30

## 2019-12-30 ENCOUNTER — AMBULATORY - HEALTHEAST (OUTPATIENT)
Dept: FAMILY MEDICINE | Facility: CLINIC | Age: 49
End: 2019-12-30

## 2019-12-30 DIAGNOSIS — E78.5 HYPERLIPIDEMIA, UNSPECIFIED HYPERLIPIDEMIA TYPE: ICD-10-CM

## 2019-12-30 LAB
ATRIAL RATE - MUSE: 85 BPM
DIASTOLIC BLOOD PRESSURE - MUSE: NORMAL
INTERPRETATION ECG - MUSE: NORMAL
P AXIS - MUSE: 59 DEGREES
PR INTERVAL - MUSE: 160 MS
QRS DURATION - MUSE: 90 MS
QT - MUSE: 364 MS
QTC - MUSE: 433 MS
R AXIS - MUSE: 10 DEGREES
SYSTOLIC BLOOD PRESSURE - MUSE: NORMAL
T AXIS - MUSE: 60 DEGREES
VENTRICULAR RATE- MUSE: 85 BPM

## 2019-12-31 ENCOUNTER — SURGERY - HEALTHEAST (OUTPATIENT)
Dept: SURGERY | Facility: CLINIC | Age: 49
End: 2019-12-31

## 2019-12-31 ASSESSMENT — MIFFLIN-ST. JEOR: SCORE: 2056.02

## 2020-01-06 ENCOUNTER — HOSPITAL ENCOUNTER (OUTPATIENT)
Dept: CARDIOLOGY | Facility: CLINIC | Age: 50
Discharge: HOME | End: 2020-01-06
Attending: SURGERY

## 2020-01-06 DIAGNOSIS — I71.21 THORACIC ASCENDING AORTIC ANEURYSM (H): ICD-10-CM

## 2020-01-06 ASSESSMENT — MIFFLIN-ST. JEOR: SCORE: 2056.02

## 2020-01-07 LAB
AORTIC ARCH: 3.2 CM
AORTIC ROOT: 4.3 CM
AORTIC VALVE MEAN VELOCITY: 81.4 CM/S
ASCENDING AORTA: 3.4 CM
AV DIMENSIONLESS INDEX VTI: 0.7
AV MEAN GRADIENT: 3 MMHG
AV PEAK GRADIENT: 5.7 MMHG
AV VALVE AREA: 2.5 CM2
AV VELOCITY RATIO: 0.7
BSA FOR ECHO PROCEDURE: 2.43 M2
CV BLOOD PRESSURE: ABNORMAL MMHG
CV ECHO HEIGHT: 69 IN
CV ECHO WEIGHT: 268 LBS
DOP CALC AO PEAK VEL: 119 CM/S
DOP CALC AO VTI: 21.2 CM
DOP CALC LVOT AREA: 3.46 CM2
DOP CALC LVOT DIAMETER: 2.1 CM
DOP CALC LVOT PEAK VEL: 79.1 CM/S
DOP CALC LVOT STROKE VOLUME: 53.7 CM3
DOP CALCLVOT PEAK VEL VTI: 15.5 CM
EJECTION FRACTION: 63 % (ref 55–75)
FRACTIONAL SHORTENING: 26.9 % (ref 28–44)
INTERVENTRICULAR SEPTUM IN END DIASTOLE: 1.31 CM (ref 0.6–1)
IVS/PW RATIO: 1
LA AREA 1: 20 CM2
LA AREA 2: 20 CM2
LEFT ATRIUM LENGTH: 4.9 CM
LEFT ATRIUM SIZE: 4.2 CM
LEFT ATRIUM VOLUME INDEX: 28.6 ML/M2
LEFT ATRIUM VOLUME: 69.4 ML
LEFT VENTRICLE CARDIAC INDEX: 1.5 L/MIN/M2
LEFT VENTRICLE CARDIAC OUTPUT: 3.8 L/MIN
LEFT VENTRICLE DIASTOLIC VOLUME INDEX: 34.9 CM3/M2 (ref 34–74)
LEFT VENTRICLE DIASTOLIC VOLUME: 84.7 CM3 (ref 62–150)
LEFT VENTRICLE HEART RATE: 70 BPM
LEFT VENTRICLE MASS INDEX: 73.3 G/M2
LEFT VENTRICLE SYSTOLIC VOLUME INDEX: 12.9 CM3/M2 (ref 11–31)
LEFT VENTRICLE SYSTOLIC VOLUME: 31.4 CM3 (ref 21–61)
LEFT VENTRICULAR INTERNAL DIMENSION IN DIASTOLE: 3.83 CM (ref 4.2–5.8)
LEFT VENTRICULAR INTERNAL DIMENSION IN SYSTOLE: 2.8 CM (ref 2.5–4)
LEFT VENTRICULAR MASS: 178.2 G
LEFT VENTRICULAR OUTFLOW TRACT MEAN GRADIENT: 2 MMHG
LEFT VENTRICULAR OUTFLOW TRACT MEAN VELOCITY: 60.8 CM/S
LEFT VENTRICULAR OUTFLOW TRACT PEAK GRADIENT: 3 MMHG
LEFT VENTRICULAR POSTERIOR WALL IN END DIASTOLE: 1.32 CM (ref 0.6–1)
LV STROKE VOLUME INDEX: 22.1 ML/M2
MITRAL VALVE DECELERATION SLOPE: 1840 MM/S2
MITRAL VALVE E/A RATIO: 0.8
MITRAL VALVE PRESSURE HALF-TIME: 92 MS
MV AVERAGE E/E' RATIO: 6.7 CM/S
MV DECELERATION TIME: 314 MS
MV E'TISSUE VEL-LAT: 9.67 CM/S
MV E'TISSUE VEL-MED: 7.64 CM/S
MV LATERAL E/E' RATIO: 6
MV MEDIAL E/E' RATIO: 7.6
MV PEAK A VELOCITY: 73.2 CM/S
MV PEAK E VELOCITY: 57.7 CM/S
MV VALVE AREA PRESSURE 1/2 METHOD: 2.4 CM2
NUC REST DIASTOLIC VOLUME INDEX: 4288 LBS
NUC REST SYSTOLIC VOLUME INDEX: 69 IN
TRICUSPID REGURGITATION PEAK PRESSURE GRADIENT: 26.4 MMHG
TRICUSPID VALVE PEAK REGURGITANT VELOCITY: 257 CM/S

## 2020-01-16 ENCOUNTER — AMBULATORY - HEALTHEAST (OUTPATIENT)
Dept: VASCULAR SURGERY | Facility: CLINIC | Age: 50
End: 2020-01-16

## 2020-01-16 DIAGNOSIS — I71.21 THORACIC ASCENDING AORTIC ANEURYSM (H): ICD-10-CM

## 2020-01-20 ENCOUNTER — COMMUNICATION - HEALTHEAST (OUTPATIENT)
Dept: VASCULAR SURGERY | Facility: CLINIC | Age: 50
End: 2020-01-20

## 2020-01-22 ENCOUNTER — COMMUNICATION - HEALTHEAST (OUTPATIENT)
Dept: CARDIOLOGY | Facility: CLINIC | Age: 50
End: 2020-01-22

## 2020-01-22 ENCOUNTER — HOSPITAL ENCOUNTER (OUTPATIENT)
Dept: CT IMAGING | Facility: CLINIC | Age: 50
Discharge: HOME | End: 2020-01-22
Attending: SURGERY

## 2020-01-22 DIAGNOSIS — I71.21 THORACIC ASCENDING AORTIC ANEURYSM (H): ICD-10-CM

## 2020-01-22 DIAGNOSIS — Z98.890 S/P ASCENDING AORTIC ANEURYSM REPAIR: ICD-10-CM

## 2020-01-22 DIAGNOSIS — Z86.79 S/P ASCENDING AORTIC ANEURYSM REPAIR: ICD-10-CM

## 2020-01-23 ENCOUNTER — OFFICE VISIT - HEALTHEAST (OUTPATIENT)
Dept: VASCULAR SURGERY | Facility: CLINIC | Age: 50
End: 2020-01-23

## 2020-01-23 DIAGNOSIS — I71.21 THORACIC ASCENDING AORTIC ANEURYSM (H): ICD-10-CM

## 2020-01-23 ASSESSMENT — MIFFLIN-ST. JEOR: SCORE: 2028.35

## 2020-01-29 ENCOUNTER — RECORDS - HEALTHEAST (OUTPATIENT)
Dept: ADMINISTRATIVE | Facility: OTHER | Age: 50
End: 2020-01-29

## 2020-01-29 ENCOUNTER — OFFICE VISIT - HEALTHEAST (OUTPATIENT)
Dept: PULMONOLOGY | Facility: OTHER | Age: 50
End: 2020-01-29

## 2020-01-29 ENCOUNTER — RECORDS - HEALTHEAST (OUTPATIENT)
Dept: PULMONOLOGY | Facility: OTHER | Age: 50
End: 2020-01-29

## 2020-01-29 DIAGNOSIS — R06.09 DYSPNEA ON EXERTION: ICD-10-CM

## 2020-01-29 DIAGNOSIS — R06.00 DYSPNEA, UNSPECIFIED: ICD-10-CM

## 2020-01-29 LAB — HGB BLD-MCNC: 15.2 G/DL

## 2020-01-29 ASSESSMENT — MIFFLIN-ST. JEOR: SCORE: 1892.73

## 2020-07-25 ENCOUNTER — HOSPITAL ENCOUNTER (OUTPATIENT)
Dept: CT IMAGING | Facility: CLINIC | Age: 50
Discharge: HOME | End: 2020-07-25
Attending: SURGERY

## 2020-07-25 DIAGNOSIS — I71.21 THORACIC ASCENDING AORTIC ANEURYSM (H): ICD-10-CM

## 2020-07-30 ENCOUNTER — OFFICE VISIT - HEALTHEAST (OUTPATIENT)
Dept: VASCULAR SURGERY | Facility: CLINIC | Age: 50
End: 2020-07-30

## 2020-07-30 DIAGNOSIS — I71.21 THORACIC ASCENDING AORTIC ANEURYSM (H): ICD-10-CM

## 2020-08-06 ENCOUNTER — COMMUNICATION - HEALTHEAST (OUTPATIENT)
Dept: CARDIOLOGY | Facility: CLINIC | Age: 50
End: 2020-08-06

## 2020-09-24 ENCOUNTER — COMMUNICATION - HEALTHEAST (OUTPATIENT)
Dept: VASCULAR SURGERY | Facility: CLINIC | Age: 50
End: 2020-09-24
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
## 2021-05-28 NOTE — TELEPHONE ENCOUNTER
Who is calling:  Dr. Shy Mccall Disability insurance   Reason for Call:  Wanting to discuss with Dr. Griffin about leave of absence of work for the patient.  Date of last appointment with primary care: 03.29.19  Okay to leave a detailed message: Yes
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
## 2021-05-28 NOTE — TELEPHONE ENCOUNTER
Who is calling:  Dr Silverman  Reason for Call:  Would like to talk with Dr Elias vigil today.

## 2021-05-28 NOTE — TELEPHONE ENCOUNTER
I was able to speak to Dr. Cazares, neurologist and updated him about the patient's clinical situation, reason for leave of absences anticipated further leave of absence with potential upcoming surgery.

## 2021-05-28 NOTE — TELEPHONE ENCOUNTER
Yadiel HERNANDEZ [facility]  [address] [place] [address]  [phone] (home)     Procedure cardiologist:  Dr. Reynolds or Smooth  PCP:  Titi Griffin MD  H&P completed by:  Robert Mosquera- 4/3/19  Admit date 5/2/19  Arrival time:  0930  Anticoagulation: None  CPAP: No  Previous PCI: No  Bypass Grafts: No  Renal Issues: No  Diabetic?: No  Device?: No  Type:  n/a    Angiogram Teaching    Reason for Visit:  Telephone call to discuss pre-procedure education in preparation for: Coronary angiogram    Procedure Prep:  Primary Cardiologist note dated: CV Surgeon  EKG results obtained, dated: am of procedure  Hemogram results obtained: am of procedure  Basic Metabolic Panel results obtained: am of procedure  Lipid Profile results obtained: defer    Patient Education  Explained indications/risks for diagnostic evaluation, including one or more of the following:  left heart catheterization, right heart catheterization, coronary angiogram, left ventriculogram, percutaneous arteritomy closure, less than 0.1% of acute myocardial infarction and CVA or death or any of the following to the degree that it could threaten life:  allergic reaction, arrhythmia, renal failure, hemorrhage, thrombosis and infection  Patient states understanding of procedure and risks and agrees to proceed.    Pre-procedure instructions  Patient instructed to be NPO after midnight.  Patient instructed to shower the evening before or the morning of the procedure.  Patient instructed to arrange for transportation home following procedure from a responsible family member of friend. No driving for at least 24 hours.  Patient instructed to have a responsible adult with them for 24 hours post-procedure.  Post-procedure follow up process.  Conscious sedation discussed.    Pre-procedure medication instructions  Patient instructed on antiplatelet medication.  Continue medications as scheduled, with a small amount of water on the day of the procedure unless  indicated.  Patient instructed to take 325 mg of Aspirin am of procedure: Yes  Other medication: instructed to only take allopurinol and metoprolol a.m. of the procedure.    *PATIENTS RECORDS AVAILABLE IN [facility] UNLESS OTHERWISE INDICATED*      Patient Active Problem List   Diagnosis     Gout     Hypertension     Aneurysm Of The Aortic Root     LVH (left ventricular hypertrophy)     Degenerative cervical disc     Carpal tunnel syndrome     Fatty liver     Prediabetes     Morbid obesity (H)     Thoracic ascending aortic aneurysm (H)       Current Outpatient Medications   Medication Sig Dispense Refill     allopurinol (ZYLOPRIM) 300 MG tablet Take 1 tablet (300 mg total) by mouth daily. 90 tablet 3     amLODIPine (NORVASC) 10 MG tablet Take 1 tablet (10 mg total) by mouth daily. 90 tablet 3     aspirin 325 MG tablet Take 325 mg by mouth daily.       atorvastatin (LIPITOR) 40 MG tablet Take 1 tablet (40 mg total) by mouth at bedtime. 90 tablet 3     indomethacin (INDOCIN) 50 MG capsule TAKE ONE CAPSULE BY MOUTH THREE TIMES DAILY WITH MEALS 30 capsule 1     lisinopril-hydrochlorothiazide (ZESTORETIC) 20-12.5 mg per tablet Take 2 tablets by mouth daily. 60 tablet 5     metoprolol succinate (TOPROL XL) 50 MG 24 hr tablet Take 1 tablet (50 mg total) by mouth daily. 90 tablet 1     No current facility-administered medications for this visit.        No Known Allergies      FIDEL Raman

## 2021-05-29 NOTE — TELEPHONE ENCOUNTER
Provider Communication  Who is calling:  Dr Knight  Facility in which provider is associated:  [facility] disability claims  Reason for call:  Provider called regarding a disability claim for this patient.  He needs to talk to Dr Elais vigil, as he needs to make a decision for the claim by tomorrow, 6/20/19  Urgency for return call:  ASAP  Okay to leave detailed message?:  Yes

## 2021-05-29 NOTE — PROGRESS NOTES
"Spiritual Care Note    Spiritual Assessment:   referred to patient on his heart testing day. No family with patient today but patient states his wife will be present on day of surgery. Patient shares he has known about his medical issue for several years now and now is the time is needs to be taken care of. Patient shares he is taking all the information in and that, \"It is a lot to take in.\" Patient doesn't appear overwhelmed but minimally concerned as he prepares for surgery. Patient seems eager to be on the other side of surgery. When asked patient states he is Rastafari but hasn't joined a kevyn community since moving to [place] with his family. Patient thanked the  for coming and welcome support.     Care Provided: Patient denies spiritual/emotional needs at this time.  offered support, encouragement and information on  availability.     Plan of Care: Spiritual care will continue to follow as part of patient's care team.    MELODY Castro, BCC    "

## 2021-05-29 NOTE — PROGRESS NOTES
Pt alone in room instructed on Resp, Therapys role in his recovery; IS. Heart pillow, flutter, and weaning, ventilator. All questions answered.
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
## 2021-05-29 NOTE — ANESTHESIA PROCEDURE NOTES
Central line    Start time: 6/10/2019 7:48 AM  End time: 6/10/2019 7:58 AM  Patient location: OR Post-induction  Indications: central pressure monitoring and vascular access  Performing Anesthesiologist: Demetra Farrell MD  Pre-procedure Checklist  Completed: patient identified, site marked, risks, benefits, and alternatives discussed, timeout performed, consent obtained, hand hygiene performed, all elements of maximal sterile barriers used including cap, mask, gown, sterile gloves, and large sheet and skin prep agent completely dried prior to procedure    Procedure Details:  Preparation: 2% chlorhexidine  Location details: right internal jugular  Site selection rationale: access  Catheter type: Introducer with Sciota-Alisson  Introducer type: MAC  Lumens:double lumen  Withdrawn and locked at: 52Number of attempts: 1  Ultrasound evaluation of access site: yes  Vessel patent by US exam  Concurrent real time visualization of needle entry  manometry confirmation of venous access    Post-procedure:   line sutured and Antimicrobial disks with CHG applied  Assessment: free fluid flow and blood return through all ports  Complications: none

## 2021-05-29 NOTE — ANESTHESIA CARE TRANSFER NOTE
Last vitals:   Vitals:    06/10/19 1602   BP: 115/55   Pulse: 80   Resp: 20   Temp: (!) 35.1  C (95.2  F)   SpO2: 100%     Patient's level of consciousness is sedated until ready to extubate  Spontaneous respirations: no: intubated/sedated  Maintains airway independently: no: intubated/sedated  Dentition unchanged: yes  Oropharynx: endotracheal tube in place    QCDR Measures:  ASA# 20 - Surgical Safety Checklist: WHO surgical safety checklist completed prior to induction    PQRS# 430 - Adult PONV Prevention: 4558F-1P - Medical reason for NOT administering combination therapy  ASA# 8 - Peds PONV Prevention: NA - Not pediatric patient, not GA or 2 or more risk factors NOT present  PQRS# 424 - Alicja-op Temp Management: 4559F - At least one body temp DOCUMENTED => 35.5C or 95.9F within required timeframe  PQRS# 426 - PACU Transfer Protocol: - Transfer of care checklist used  ASA# 14 - Acute Post-op Pain: NA - Patient under age 10y or did not go to PACU     Direct sign on by anesthesiologist and CRNA to ICU intensivist, NP, RN, RT.

## 2021-05-29 NOTE — ANESTHESIA PROCEDURE NOTES
SANDEEP    Patient location during procedure: OR  Start time: 6/10/2019 8:18 AM  Staffing:  Performing  Anesthesiologist: Demetra Farrell MD  SANDEEP:  Type/Reason: Monitoring SANDEEP  Technique: blind insertion  Difficulty: easy  Anesthesia Monitoring: see additional note

## 2021-05-29 NOTE — TELEPHONE ENCOUNTER
Our part of forms were filled out and then paperwork was faxed in. Pt was notified and originals put at FD for him to .

## 2021-05-29 NOTE — ANESTHESIA PROCEDURE NOTES
Arterial Line  Reason for Procedure: hemodynamic monitoring  Patient location during procedure: OR pre-induction  Start time: 6/10/2019 8:06 AM  End time: 6/10/2019 8:10 AM  Staffing:  Performing  Anesthesiologist: Demetra Farrell MD  Sterile Precautions:  sterile barriers used during insertion: cap, mask, sterile gloves, large sheet, and hand hygiene used.  Arterial Line:     Laterality: right  Location: radial  Prepped with: ChloroPrep    Needle gauge: 20 G  Number of Attempts: 1  Secured with: tape, transparent dressing and pressure dressing  Flushed with: saline    Ultrasound evaluation of access site: yes  Vessel patent by US exam    Concurrent real time visualization of needle entry

## 2021-05-29 NOTE — ANESTHESIA PROCEDURE NOTES
Arterial Line  Reason for Procedure: hemodynamic monitoring  Patient location during procedure: OR pre-induction  Start time: 6/10/2019 7:30 AM  End time: 6/10/2019 7:34 AM  Staffing:  Performing  Anesthesiologist: Demetra Farrell MD  Sterile Precautions:  sterile barriers used during insertion: cap, mask, sterile gloves, large sheet, and hand hygiene used.  Arterial Line:     Laterality: left  Location: brachial  Prepped with: ChloroPrep    Needle gauge: 20 G  Number of Attempts: 1  Secured with: transparent dressing, pressure dressing and tape  Flushed with: saline  1% lidocaine local anesthesia used for skin prep.   See MAR for additional medications given.  Ultrasound evaluation of access site: yes  Vessel patent by US exam    Concurrent real time visualization of needle entry

## 2021-05-29 NOTE — ANESTHESIA POSTPROCEDURE EVALUATION
Patient: Yadiel Roth  ASCENDING AORTA AND AORTIC ARCH ANEURYSM REPAIR  Anesthesia type: general    Patient location: ICU  Last vitals:   Vitals Value Taken Time   /55 6/10/2019  4:02 PM   Temp 37.9  C (100.2  F) 6/11/2019  7:00 AM   Pulse 68 6/11/2019  7:15 AM   Resp 20 6/11/2019  7:00 AM   SpO2 94 % 6/11/2019  7:15 AM   Vitals shown include unvalidated device data.  Post vital signs: stable  Level of consciousness: awake and responds to simple questions  Post-anesthesia pain: pain controlled  Post-anesthesia nausea and vomiting: no  Pulmonary: unassisted, return to baseline  Cardiovascular: stable and blood pressure at baseline  Hydration: adequate  Anesthetic events: no    QCDR Measures:  ASA# 11 - Alicja-op Cardiac Arrest: ASA11B - Patient did NOT experience unanticipated cardiac arrest  ASA# 12 - Alicja-op Mortality Rate: ASA12B - Patient did NOT die  ASA# 13 - PACU Re-Intubation Rate: ASA13B - Patient did NOT require a new airway mgmt  ASA# 10 - Composite Anes Safety: ASA10A - No serious adverse event    Additional Notes:  Remains intubated on POD 1.  Failed wean, but expects to wean successfully this AM.

## 2021-05-30 VITALS — BODY MASS INDEX: 38.4 KG/M2 | WEIGHT: 260 LBS

## 2021-05-30 VITALS — BODY MASS INDEX: 38.51 KG/M2 | WEIGHT: 260 LBS | HEIGHT: 69 IN

## 2021-05-30 VITALS — WEIGHT: 260 LBS | BODY MASS INDEX: 38.4 KG/M2

## 2021-05-30 VITALS — BODY MASS INDEX: 38.54 KG/M2 | WEIGHT: 261 LBS

## 2021-05-30 NOTE — PROGRESS NOTES
"  Assessment:       1. S/P ascending aortic and aortic arch aneurysm repair with a 32mm Gelweave she 06/10/2019   amLODIPine (NORVASC) 5 MG tablet     BP 98/60 (Patient Site: Left Arm, Patient Position: Sitting, Cuff Size: Adult Large)   Pulse 96   Temp 98.3  F (36.8  C) (Oral)   Resp 16   Ht 5' 9\" (1.753 m)   Wt (!) 240 lb 4.8 oz (109 kg)   BMI 35.49 kg/m      Plan:       S/P ascending aortic and aortic arch aneurysm repair with a 32mm Gelweave she 06/10/2019   Patient was discharged to acute care rehab on 06/17/2019  Discharged to home on 06/27/2019 and presents today for a postop CV surgery follow up  Patient developed postop aphasia that is persistent and he is scheduled to see an HENT specialist in 2-3 weeks  He has also experienced coughing spells that have lead to syncope  He denies difficulties swallowing but there maybe vocal cord incompetency resulting in the slow aspiration of oral secretion leading to profound coughing  Denies occasional incisional chest pain that is now less often, denies shortness of breath  Sternal incision is well approximated and healing well, chest tubes insertion site is still open with scanty drainage, advised to leave open to air to dry  Lung sounds clear and no sternal instability is appreciated  Appetite is still poor, bowel movent is regular and he voids without difficulties.  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     allopurinol (ZYLOPRIM) 300 MG tablet Take 1 tablet (300 mg total) by mouth daily.     aspirin 81 mg chewable tablet Chew 1 tablet (81 mg total) daily.     atorvastatin (LIPITOR) 40 MG tablet Take 1 tablet (40 mg total) by mouth at bedtime.     docusate sodium (COLACE) 100 MG capsule Take 1 capsule (100 mg total) by mouth 2 (two) times a day as needed for constipation.     furosemide (LASIX) 40 MG tablet Take 1 tablet (40 mg total) by mouth daily. For 2 weeks, then stop     metoprolol tartrate (LOPRESSOR) 100 MG tablet Take 1 tablet (100 mg total) by mouth 2 (two) times a day.     multivitamin with minerals (THERA-M) 9 mg iron-400 mcg Tab tablet Take 1 tablet by mouth daily.     potassium chloride (K-DUR,KLOR-CON) 20 MEQ tablet Take 1 tablet (20 mEq total) by mouth 2 (two) times a day. For 2 weeks, then stop     tamsulosin (FLOMAX) 0.4 mg cap Take 1 capsule (0.4 mg total) by mouth Daily after breakfast.     amLODIPine (NORVASC) 5 MG tablet Take 1 tablet (5 mg total) by mouth daily.        Subjective:        Patient ID: Yadiel Roth is a 49 y.o. male.    Chief Complaint:  HPI  The following portions of the patient's history were reviewed and updated as appropriate: allergies, current medications, past family history, past medical history, past social history, past surgical history and problem list.  Mr Roth is a 49 years old male who underwent an ascending aortic and aortic arch aneurysm repair with a 32mm Gelweave she 06/10/2019.   Patient was discharged to acute care rehab on 06/17/2019 and then discharged to home on 06/27/2019. He presents today for a postop CV surgery follow up. BP 98/60, reduced Norvasc 10 mg to Norvasc 5 mg daily.  Patient developed postop aphasia that is persistent and he is scheduled to see an HENT specialist in 2-3 weeks.  He has also experienced coughing spells that have lead to syncope while in  the hospital and also at home.  He denies difficulties swallowing but there maybe vocal cord incompetency resulting in the slow aspiration of oral secretion leading to profound coughing.  Denies occasional incisional chest pain that is now less often and denies shortness of breath.  Sternal incision is well approximated and healing well, however chest tubes insertion site is still open with scanty drainage, advised to leave open to air to dry.  Lung sounds clear and no sternal instability is appreciated.  Appetite is still poor, bowel movent is regular and he voids without difficulties.  Saw his PCP Titi Perez MD today 07/02/2019. Scheduled to start outpatient cardiac rehab on 07/09/2019. He is also scheduled to see Dr Sandoval on 08/05/2019.  Would have referred patient to speech pathologist, but since patient is seeing a HENT specialist, will defer to them to formulate the treatment plan  Okay to start driving by 07/10/2019.  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    Review of Systems   Constitution: Negative.   HENT: Positive for hoarse voice.    Eyes: Negative.    Cardiovascular: Negative.    Respiratory: Negative.    Hematologic/Lymphatic: Negative.    Skin: Negative.    Musculoskeletal: Negative.    Gastrointestinal: Negative.    Genitourinary: Negative.    Neurological: Negative.    Psychiatric/Behavioral: Negative.           Objective:     Physical Exam   Constitutional: He appears healthy. No distress.   HENT:   Nose: Nose normal.   Mouth/Throat: Dentition is normal. Oropharynx is clear.   Eyes: Pupils are equal, round, and reactive to light. Conjunctivae are normal.   Neck: Normal range of motion and thyroid normal. Neck supple.   Pulmonary/Chest: Effort normal and breath sounds normal. He has no wheezes. He has no rales. He exhibits no tenderness.   Abdominal: Soft. Bowel  sounds are normal.   Musculoskeletal: Normal range of motion.   Neurological: He is alert and oriented to person, place, and time. He has normal motor skills, normal reflexes and intact cranial nerves. Gait normal.   Skin: Skin is warm and dry.
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
## 2021-05-30 NOTE — TELEPHONE ENCOUNTER
Patient informed that Mimi from [facility] has been trying to reach him. Did advise patient to call Mimi at [phone]

## 2021-05-30 NOTE — TELEPHONE ENCOUNTER
Received MTM referral from provider     Patient was not reachable after several attempts, will route to MTM Pharmacist/Provider as an FYI. Left MTM scheduling information on patients voicemail.    Thank you for the referral,    Soila Stokes, MTM Coordinator

## 2021-05-30 NOTE — TELEPHONE ENCOUNTER
Who is calling:  Mimi PARRA case manager with [facility]   Reason for Call:  Caller has not been able to reach patient.  Caller is requesting the clinic to reach out to patient and request him to follow up with caller.   Caller stated This is a volunteer service for the patient to help him manage his care.    Date of last appointment with primary care: 7/2/19  Okay to leave a detailed message: Yes

## 2021-05-31 VITALS — WEIGHT: 263 LBS | BODY MASS INDEX: 38.95 KG/M2 | HEIGHT: 69 IN

## 2021-05-31 VITALS — HEIGHT: 69 IN | WEIGHT: 262 LBS | BODY MASS INDEX: 38.8 KG/M2

## 2021-05-31 VITALS — BODY MASS INDEX: 38.84 KG/M2 | WEIGHT: 263 LBS

## 2021-05-31 VITALS — WEIGHT: 263 LBS | BODY MASS INDEX: 38.84 KG/M2

## 2021-05-31 VITALS — HEIGHT: 69 IN | BODY MASS INDEX: 38.95 KG/M2 | WEIGHT: 263 LBS

## 2021-05-31 VITALS — BODY MASS INDEX: 40.02 KG/M2 | WEIGHT: 271 LBS

## 2021-05-31 NOTE — PATIENT INSTRUCTIONS - HE
Anticipate needing to see you again sometime in September for follow-up, possibly additional paperwork.

## 2021-05-31 NOTE — TELEPHONE ENCOUNTER
Yadiel dropped off forms to be filled out.  aleshia said they never received any.  Please call patient when faxed in

## 2021-05-31 NOTE — TELEPHONE ENCOUNTER
Refill Approved    Rx renewed per Medication Renewal Policy. Medication was last renewed on 6/17/19, last OV 8/21/19.    Maria Ines Sesay, [facility] Triage/Med Refill 9/1/2019     Requested Prescriptions   Pending Prescriptions Disp Refills     metoprolol succinate (TOPROL-XL) 50 MG 24 hr tablet [Pharmacy Med Name: METOPROLOL SUCC ER 50 MG TAB] 90 tablet 1     Sig: TAKE 1 TABLET BY MOUTH EVERY DAY       Beta-Blockers Refill Protocol Passed - 9/1/2019 12:29 AM        Passed - PCP or prescribing provider visit in past 12 months or next 3 months     Last office visit with prescriber/PCP: 8/21/2019 Titi Griffin MD OR same dept: 8/21/2019 Titi Griffin MD OR same specialty: 8/21/2019 Titi Griffin MD  Last physical: Visit date not found Last MTM visit: Visit date not found   Next visit within 3 mo: Visit date not found  Next physical within 3 mo: Visit date not found  Prescriber OR PCP: Titi Griffin MD  Last diagnosis associated with med order: 1. Hypertension, unspecified type  - metoprolol succinate (TOPROL-XL) 50 MG 24 hr tablet [Pharmacy Med Name: METOPROLOL SUCC ER 50 MG TAB]; TAKE 1 TABLET BY MOUTH EVERY DAY  Dispense: 90 tablet; Refill: 1    If protocol passes may refill for 12 months if within 3 months of last provider visit (or a total of 15 months).             Passed - Blood pressure filed in past 12 months     BP Readings from Last 1 Encounters:   08/22/19 132/78
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
## 2021-06-01 VITALS — WEIGHT: 264.3 LBS | BODY MASS INDEX: 39.03 KG/M2

## 2021-06-01 VITALS — BODY MASS INDEX: 39.58 KG/M2 | WEIGHT: 268 LBS

## 2021-06-01 VITALS — WEIGHT: 265 LBS | BODY MASS INDEX: 39.13 KG/M2

## 2021-06-01 VITALS — WEIGHT: 268 LBS | BODY MASS INDEX: 39.58 KG/M2

## 2021-06-01 NOTE — TELEPHONE ENCOUNTER
Name of form/paperwork: Other:  Short term disability  THe blank froms were just faxed to clinic.  Please do not complete until patient is seen in clinic.  The STD was denied with first forms so patient needs to be seen to go over details to claify forms.  Have you been seen for this request: Patient is going to call to make the appointment  Do we have the form: Yes- From [facility] but hold for provider to complete more completely with visit.   When is form needed by: when seen  How would you like the form returned: give to patient  Fax Number: NA  Patient Notified form requests are processed in 3-5 business days: No  (If patient needs form sooner, please note that in this message.)  Okay to leave a detailed message? Yes [phone]

## 2021-06-01 NOTE — PATIENT INSTRUCTIONS - HE
I will comment on laboratory studies.  I assume will would need to be seen you this fall because of paperwork and so forth
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
## 2021-06-01 NOTE — TELEPHONE ENCOUNTER
Left message for patient to call back. If patient calls back, please relay message below.    Have not received short term disability forms. Can patient please bring them in with him when he comes to his appointment.
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
## 2021-06-02 VITALS — WEIGHT: 267.44 LBS | BODY MASS INDEX: 39.49 KG/M2

## 2021-06-02 VITALS — WEIGHT: 267.5 LBS | BODY MASS INDEX: 39.5 KG/M2

## 2021-06-02 VITALS — WEIGHT: 266 LBS | BODY MASS INDEX: 39.28 KG/M2

## 2021-06-02 VITALS — WEIGHT: 267.7 LBS | BODY MASS INDEX: 39.53 KG/M2

## 2021-06-02 VITALS — WEIGHT: 267.1 LBS | BODY MASS INDEX: 39.44 KG/M2

## 2021-06-02 VITALS — WEIGHT: 269 LBS | BODY MASS INDEX: 39.84 KG/M2 | HEIGHT: 69 IN

## 2021-06-02 VITALS — BODY MASS INDEX: 39.43 KG/M2 | WEIGHT: 267 LBS

## 2021-06-02 VITALS — WEIGHT: 268.4 LBS | BODY MASS INDEX: 39.64 KG/M2

## 2021-06-02 VITALS — WEIGHT: 265 LBS | BODY MASS INDEX: 39.13 KG/M2
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
## 2021-06-03 VITALS — WEIGHT: 265.7 LBS | BODY MASS INDEX: 39.24 KG/M2

## 2021-06-03 VITALS
DIASTOLIC BLOOD PRESSURE: 102 MMHG | BODY MASS INDEX: 38.22 KG/M2 | OXYGEN SATURATION: 97 % | WEIGHT: 258.8 LBS | HEART RATE: 95 BPM | SYSTOLIC BLOOD PRESSURE: 164 MMHG

## 2021-06-03 VITALS — BODY MASS INDEX: 35.34 KG/M2 | WEIGHT: 239.3 LBS

## 2021-06-03 VITALS — WEIGHT: 246 LBS | BODY MASS INDEX: 36.43 KG/M2 | HEIGHT: 69 IN

## 2021-06-03 VITALS — WEIGHT: 265 LBS | BODY MASS INDEX: 39.25 KG/M2 | HEIGHT: 69 IN

## 2021-06-03 VITALS — BODY MASS INDEX: 39.58 KG/M2 | HEIGHT: 69 IN | WEIGHT: 267.2 LBS

## 2021-06-03 VITALS
BODY MASS INDEX: 37.07 KG/M2 | WEIGHT: 251 LBS | SYSTOLIC BLOOD PRESSURE: 121 MMHG | HEART RATE: 95 BPM | OXYGEN SATURATION: 97 % | DIASTOLIC BLOOD PRESSURE: 81 MMHG

## 2021-06-03 VITALS
TEMPERATURE: 97.5 F | OXYGEN SATURATION: 98 % | BODY MASS INDEX: 38.26 KG/M2 | DIASTOLIC BLOOD PRESSURE: 85 MMHG | WEIGHT: 259.1 LBS | SYSTOLIC BLOOD PRESSURE: 140 MMHG | HEART RATE: 81 BPM

## 2021-06-03 VITALS — BODY MASS INDEX: 39.13 KG/M2 | WEIGHT: 265 LBS

## 2021-06-03 VITALS — WEIGHT: 266.7 LBS | HEIGHT: 69 IN | BODY MASS INDEX: 39.5 KG/M2

## 2021-06-03 VITALS — WEIGHT: 264.4 LBS | BODY MASS INDEX: 39.16 KG/M2 | HEIGHT: 69 IN

## 2021-06-03 VITALS — WEIGHT: 245.8 LBS | BODY MASS INDEX: 36.3 KG/M2

## 2021-06-03 VITALS — HEIGHT: 69 IN | BODY MASS INDEX: 35.59 KG/M2 | WEIGHT: 240.3 LBS

## 2021-06-03 NOTE — TELEPHONE ENCOUNTER
Refill Given    Refill given per Policy, patient informed they are overdue for Labs   OV    Nkechi Reeder, [facility] Triage/Med Refill 11/23/2019    Requested Prescriptions   Pending Prescriptions Disp Refills     allopurinol (ZYLOPRIM) 300 MG tablet [Pharmacy Med Name: ALLOPURINOL 300 MG TABLET] 90 tablet 3     Sig: TAKE 1 TABLET BY MOUTH EVERY DAY       Allopurinol/Febuxostat Refill Protocol  Failed - 11/23/2019  8:11 AM        Failed - LFT or AST or ALT in last 12 months     Albumin   Date Value Ref Range Status   11/03/2017 3.7 3.5 - 5.0 g/dL Final     Bilirubin, Total   Date Value Ref Range Status   11/03/2017 0.7 0.0 - 1.0 mg/dL Final     Alkaline Phosphatase   Date Value Ref Range Status   11/03/2017 78 45 - 120 U/L Final     AST   Date Value Ref Range Status   11/03/2017 23 0 - 40 U/L Final     ALT   Date Value Ref Range Status   11/03/2017 37 0 - 45 U/L Final     Protein, Total   Date Value Ref Range Status   11/03/2017 7.8 6.0 - 8.0 g/dL Final                Passed - Visit with PCP or prescribing provider visit in past 12 months     Last office visit with prescriber/PCP: 10/30/2019 Titi Griffin MD OR same dept: 10/30/2019 Titi Griffin MD OR same specialty: 10/30/2019 Titi Griffin MD  Last physical: Visit date not found Last MTM visit: Visit date not found   Next visit within 3 mo: Visit date not found  Next physical within 3 mo: Visit date not found  Prescriber OR PCP: Titi Griffin MD  Last diagnosis associated with med order: 1. Gout  - allopurinol (ZYLOPRIM) 300 MG tablet [Pharmacy Med Name: ALLOPURINOL 300 MG TABLET]; TAKE 1 TABLET BY MOUTH EVERY DAY  Dispense: 90 tablet; Refill: 3    If protocol passes may refill for 12 months if within 3 months of last provider visit (or a total of 15 months).

## 2021-06-03 NOTE — TELEPHONE ENCOUNTER
Referral Request  Type of referral: ENT   Who s requesting: Patient  Why the request:  Vocal cord Paralysis , can hardly talk    Have you been seen for this request: Yes 10/30/19 , 10/7/19 , 9/18/19   Does patient have a preference on a group/provider ?   MD preference ??   Okay to leave a detailed message?  Yes                PLAN:  1.  Paperwork filled out allowing the patient 3 more weeks of leave.  2.  Patient continues to work with speech therapy and ENT, on the vocal cord paralysis, he has not yet had an injection but this has been discussed.  3.  Reassurance about the upper respiratory tract infection, symptomatic treatment.  4.  3-week follow-up.     No orders of the defined types were placed in this encounter.     There are no discontinued medications.  No follow-ups on file.

## 2021-06-03 NOTE — TELEPHONE ENCOUNTER
Who is calling:  Patient   Reason for Call:  Patient states he is going to see a diffrent Doctor for his voice at 10:30am today patient requesting a copy of attending physician statement . Patient will come and pick it up from clinic please keep it ready at .  Date of last appointment with primary care: 10/30/19  Okay to leave a detailed message: No

## 2021-06-03 NOTE — PROGRESS NOTES
HISTORY OF PRESENT ILLNESS  Asked to see by Dr. Griffin for hoarseness. Post heart surgery the patient experienced a left vocal cord paralysis. Since surgery he has voice has been breathy and he has had problems with heavy coughing. Patient reports that he believes that he has vasovagal syncope. He sometimes has problems with swallowing. It feels like it is going down the wrong tube and it occasionally causes him to throw up. He feels that it is more comfortable to sleep upright on a couch then in the bed. He is currently seeing a speech therapist. In addition it was recommended    REVIEW OF SYSTEMS  Review of Systems: a 10-system review was performed. Pertinent positives are noted in the HPI and on a separate scanned document in the chart.    PMH, PSH, FH and SH has documented in the EHR.      EXAM    CONSTITUTIONAL  General Appearance:  Normal, well developed, well nourished, no obvious distress  Ability to Communicate:  communicates appropriately.    HEAD AND FACE  Appearance and Symmetry:  Normal, no scalp or facial scarring or suspicious lesions.  Paranasal sinuses tenderness:  Normal, Paranasal sinuses non tender    EARS  Clinical speech reception threshold:  Normal, able to hear normal speech.  Auricle:  Normal, Auricles without scars, lesions, masses.  External auditory canal:  Normal, External auditory canal normal.  Tympanic membrane:  Normal, Tympanic membranes normal without swelling or erythema.  Tympanic membrane mobility:  Normal, Normal tympanic membrane mobility.    NOSE (speculum or scope)  Architecture:  Normal, Grossly normal external nasal architecture with no masses or lesions.  Mucosa:  Normal mucosa, No polyps or masses.  Septum:  Normal, Septum non-obstructing.  Turbinates:  Normal, No turbinate abnormalities    ORAL CAVITY AND OROPHARYNX  Lips:  Normal.  Dental and gingiva:  Normal, No obvious dental or gingival disease.  Mucosa:  Normal, Moist mucous membranes.  Tongue:  Normal, Tongue  mobile with no mucosal abnormalities  Hard and soft palate:  Normal, Hard and soft palate without cleft or mucosal lesions.  Oral pharynx:  Normal, Posterior pharynx without lesions or remarkable asymmetry.  Saliva:  Normal, Clear saliva.  Masses:  Normal, No palpable masses or pathologically enlarged lymph nodes.    NECK  Masses/lymph nodes:  Normal, No worrisome neck masses or lymph nodes.  Salivary glands:  Normal, Parotid and submandibular glands.  Trachea and larynx position:  Normal, Trachea and larynx midline.  Thyroid:  Normal, No thyroid abnormality.  Tenderness:  Normal, No cervical tenderness.  Suppleness:  Normal, Neck supple    NEUROLOGICAL  Speech pattern:  Normal, Proasaic    RESPIRATORY  Symmetry and Respiratory effort:  Normal, Symmetric chest movement and expansion with no increased intercostal retractions or use of accessory muscles.     IMPRESSION  History of left vocal cord paralysis with severe hoarseness. Patient declined laryngoscopy.    RECOMMENDATION  I discussed options with the patient including vocal cord injection with a temporary gel that resorbs over time should his function return. I discussed the procedure, goals and risks. He would like to proceed. We will set this up in the near future.    Bulmaro Saunders MD

## 2021-06-04 VITALS
SYSTOLIC BLOOD PRESSURE: 138 MMHG | HEIGHT: 69 IN | DIASTOLIC BLOOD PRESSURE: 93 MMHG | BODY MASS INDEX: 38.53 KG/M2 | OXYGEN SATURATION: 97 % | HEART RATE: 101 BPM | WEIGHT: 260.13 LBS | TEMPERATURE: 97.8 F

## 2021-06-04 VITALS
HEIGHT: 69 IN | BODY MASS INDEX: 34.36 KG/M2 | SYSTOLIC BLOOD PRESSURE: 138 MMHG | DIASTOLIC BLOOD PRESSURE: 88 MMHG | OXYGEN SATURATION: 94 % | WEIGHT: 232 LBS | RESPIRATION RATE: 14 BRPM | HEART RATE: 100 BPM

## 2021-06-04 VITALS
HEIGHT: 69 IN | WEIGHT: 264.3 LBS | RESPIRATION RATE: 16 BRPM | SYSTOLIC BLOOD PRESSURE: 150 MMHG | DIASTOLIC BLOOD PRESSURE: 90 MMHG | HEART RATE: 88 BPM | BODY MASS INDEX: 39.14 KG/M2

## 2021-06-04 VITALS
HEIGHT: 69 IN | DIASTOLIC BLOOD PRESSURE: 80 MMHG | RESPIRATION RATE: 16 BRPM | BODY MASS INDEX: 38.79 KG/M2 | HEART RATE: 84 BPM | SYSTOLIC BLOOD PRESSURE: 120 MMHG | WEIGHT: 261.9 LBS | TEMPERATURE: 98 F

## 2021-06-04 VITALS — WEIGHT: 268 LBS | HEIGHT: 69 IN | BODY MASS INDEX: 39.69 KG/M2

## 2021-06-04 VITALS
BODY MASS INDEX: 39.09 KG/M2 | OXYGEN SATURATION: 99 % | WEIGHT: 264.7 LBS | HEART RATE: 92 BPM | SYSTOLIC BLOOD PRESSURE: 143 MMHG | DIASTOLIC BLOOD PRESSURE: 81 MMHG

## 2021-06-04 NOTE — TELEPHONE ENCOUNTER
New Appointment Needed  What is the reason for the visit:    Pre-Op Appt Request  When is the surgery? :  12/31/19  Where is the surgery?:   [facility]  Who is the surgeon? :  Unknown  What type of surgery is being done?: Throat surgery  Provider Preference: Any available  How soon do you need to be seen?: As soon as possible  Waitlist offered?: No  Okay to leave a detailed message:  Yes

## 2021-06-04 NOTE — ANESTHESIA PREPROCEDURE EVALUATION
Anesthesia Evaluation      Patient summary reviewed     Airway   Mallampati: II  Neck ROM: full   Pulmonary - negative ROS and normal exam                          Cardiovascular - normal exam  (+) hypertension, ,      Neuro/Psych    (+) neuromuscular disease,      Endo/Other    (+) diabetes mellitus, obesity,      GI/Hepatic/Renal            Dental - normal exam                        Anesthesia Plan  Planned anesthetic: general endotracheal    ASA 3     Anesthetic plan and risks discussed with: patient    Post-op plan: routine recovery
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
## 2021-06-04 NOTE — TELEPHONE ENCOUNTER
Left detailed message for patient regarding appointment.    Opening we have is 12/27/19 at 10:30am. Please schedule pre-op appointment with  at that time or with any other provider.

## 2021-06-04 NOTE — ANESTHESIA CARE TRANSFER NOTE
Last vitals:   Vitals:    12/31/19 0900   BP: 140/74   Pulse:    Resp: 16   Temp: 36.3  C (97.3  F)   SpO2:      Patient's level of consciousness is drowsy  Spontaneous respirations: yes  Maintains airway independently: yes  Dentition unchanged: yes  Oropharynx: oropharynx clear of all foreign objects    QCDR Measures:  ASA# 20 - Surgical Safety Checklist: WHO surgical safety checklist completed prior to induction    PQRS# 430 - Adult PONV Prevention: 4558F - Pt received => 2 anti-emetic agents (different classes) preop & intraop  ASA# 8 - Peds PONV Prevention: 4558F - Pt received => 2 anti-emetic agents (different classes) preop & intraop  PQRS# 424 - [place]ja-op Temp Management: 4559F - At least one body temp DOCUMENTED => 35.5C or 95.9F within required timeframe  PQRS# 426 - PACU Transfer Protocol: - Transfer of care checklist used  ASA# 14 - Acute Post-op Pain: ASA14B - Patient did NOT experience pain >= 7 out of 10
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
## 2021-06-04 NOTE — PATIENT INSTRUCTIONS - HE
I will comment on laboratory studies separately.  See us back in 3 months.  I did restart you on atorvastatin and faxed in a new prescription.

## 2021-06-04 NOTE — ANESTHESIA POSTPROCEDURE EVALUATION
Patient: Yadiel Roth  INJECTION, VOCAL CORD, LARYNGOSCOPIC  Please order the radiesse gel that is temporary.  Anesthesia type: general    Patient location: PACU  Last vitals:   Vitals Value Taken Time   /68 12/31/2019 10:15 AM   Temp 36.3  C (97.3  F) 12/31/2019  9:00 AM   Pulse 70 12/31/2019 10:26 AM   Resp 16 12/31/2019 10:00 AM   SpO2 97 % 12/31/2019 10:26 AM   Vitals shown include unvalidated device data.  Post vital signs: stable  Level of consciousness: awake and responds to simple questions  Post-anesthesia pain: pain controlled  Post-anesthesia nausea and vomiting: no  Pulmonary: unassisted, return to baseline  Cardiovascular: stable and blood pressure at baseline  Hydration: adequate  Anesthetic events: no    QCDR Measures:  ASA# 11 - Alicja-op Cardiac Arrest: ASA11B - Patient did NOT experience unanticipated cardiac arrest  ASA# 12 - Alicja-op Mortality Rate: ASA12B - Patient did NOT die  ASA# 13 - PACU Re-Intubation Rate: ASA13B - Patient did NOT require a new airway mgmt  ASA# 10 - Composite Anes Safety: ASA10A - No serious adverse event    Additional Notes:

## 2021-06-04 NOTE — PROGRESS NOTES
Cardiac surgery    Mr. Roth is a 49 year-old man who is status post repair of an ascending aortic and aortic arch aneurysm with a 32 mm Gelweave great and elephant trunk on 6/10/2019. Unfortunately, he has had problems with left vocal cord paralysis causing hoarseness since then, and this has not improved much with treatment by an ENT surgeon. I believe the cord was injected but he is still hoarse. He is scheduled for left vocal cord injection in the coming weeks by Dr. Bulmaro Saunders. He also has pain in his right infraclavicular region related to cannulation there. He is not interested in seeing a pain specialist at this time. He should have long term follow up with Dr. Radha Bonner, a vascular surgeon for the remainder of his aorta, but he does not want to have any intervention if he can avoid it. He will need a follow up CTA in June 2020.  I reviewed his most recent CT scan (6/2019) and it looks fine. His descending thoracic aorta measures 4.6 cm. There is an elephant trunk graft seen in the proximal descending (this was misread as a dissection flap on the radiology reading of the CT) that would provide a landing zone should he need a descending thoracic aortic stent graft in the future.    I will see him again in 3 months to reassess and coordinate care.    Daryl Preston MD PhD

## 2021-06-05 ENCOUNTER — RECORDS - HEALTHEAST (OUTPATIENT)
Dept: FAMILY MEDICINE | Facility: CLINIC | Age: 51
End: 2021-06-05

## 2021-06-05 DIAGNOSIS — I71.20 ANEURYSM OF THORACIC AORTA (H): ICD-10-CM

## 2021-06-05 NOTE — PROGRESS NOTES
CT comp 1/22 [facility]. CONSULT. previous pt. Referred by ZEFERINO ROSADO- status post repair of an ascending aortic and aortic arch aneurysm on 6/10/19. Descending thoracic aorta measures 4.6 cm. Asa, statin    He states his vocal cord was damaged during surgery.

## 2021-06-05 NOTE — TELEPHONE ENCOUNTER
----- Message from Mann MARINO RN sent at 1/16/2020  4:19 PM CST -----  Please schedule CT prior to seeing Faizer.
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
## 2021-06-05 NOTE — TELEPHONE ENCOUNTER
LEBRON to schedule CT before seeing Faizer on Thursday.   Writer did leave CC #: [phone]  As well as Vascular #: [phone]
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
## 2021-06-05 NOTE — PROGRESS NOTES
He would not typically need a CT scan of the abdomen pelvis in addition to the CT scan of the chest, I also want to clarify if this is something I am expected to do or the surgeon to order the scan?  
musa to assist scheduling a CT scan before consult with Ha on 01/23/20, MPK 1/17/20 2611  
patient

## 2021-06-05 NOTE — PATIENT INSTRUCTIONS - HE
It was good to meet you in clinic today. This is what we discussed:    1. The chest CT and lung function tests show some mild restriction on the lungs from the aorta and the surgery.  2. Your underlying lungs are normal.  3. There is also some evidence of upper airway obstruction from the left vocal cord paralysis.  4. Continue with your exercise regimen and speech therapy.  5. Continuing to use the incentive spirometer to try to open up the lungs.  6. Call any time with questions or concerning symptoms.    Robert Bah MD  Pulmonary and Critical Care Medicine  [facility]  Office [phone]
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
## 2021-06-10 NOTE — TELEPHONE ENCOUNTER
M for pt explaining that he can continue to follow with Dr. Bonner and call CV office if he starts having any issues. Contact info confirmed.    ----- Message from Daryl Preston MD sent at 8/6/2020  8:40 AM CDT -----  Elma    I don't think I need to see him right now. We just need to keep him on the radar when he has follow up with Radha.    Thank you  Bill  ----- Message -----  From: Estefany Schmidt RN  Sent: 8/5/2020   8:50 AM CDT  To: Daryl Preston MD    He just had his follow up with Ha and his CT completed. CT looks grossly unchanged and they measure his descending at 58 mm.     Let me know if you'd like to see or speak to him or neither. :)    Elma  ----- Message -----  From: Estefany Schmidt RN  Sent: 7/24/2020  To: Estefany Schmidt RN    [facility] 7/23 Ha and CT - [facility] follow up.    ----- Message -----  From: Daryl Preston MD  Sent: 3/13/2020   9:07 AM CDT  To: FIDEL Camargo    Yes I think a CT without contrast is ok. Also, let's wait until after the visit with Dr. Bonner before I see the patient. I am really just keeping an eye on him to help out and coordinate care.    Thank you  Bill  ----- Message -----  From: Estefany Schmidt RN  Sent: 3/12/2020   1:00 PM CDT  To: Daryl Preston MD    You wanted to see him back in follow up in June 2020. He has a CT without contrast scheduled in July from Dr. Bonner. Will this be sufficient for you to see what you need to see or should I order a CT with contrast for him before he sees us again?    Elma Ellis  ----- Message -----  From: Estefany Schmidt RN  Sent: 1/7/2020  To: Estefany Schmidt RN    [facility] review:    Echo 1/6

## 2021-06-10 NOTE — PROGRESS NOTES
".Yadiel Roth is a 50 y.o. male who is being evaluated via a billable telephone visit.      The patient has been notified of following:     \"This telephone visit will be conducted via a call between you and your physician/provider. We have found that certain health care needs can be provided without the need for a physical exam.  This service lets us provide the care you need with a short phone conversation.  If a prescription is necessary we can send it directly to your pharmacy.  If lab work is needed we can place an order for that and you can then stop by our lab to have the test done at a later time.    Telephone visits are billed at different rates depending on your insurance coverage. During this emergency period, for some insurers they may be billed the same as an in-person visit.  Please reach out to your insurance provider with any questions.    If during the course of the call the physician/provider feels a telephone visit is not appropriate, you will not be charged for this service.\"    Patient has given verbal consent to a Telephone visit? Yes    Patient would like to receive their AVS by AVS Preference: Mail a copy.    6m f/u. residual descending thoracic aortic aneurysm with maximal diameter 5.2 cm. No vitals taken today.     Dia Garza RN    "

## 2021-06-10 NOTE — PATIENT INSTRUCTIONS - HE
PLEASE CHECK YOUR BLOOD PRESSURE DAILY AND DOCUMENT. WE WILL DISCUSS AT YOUR FOLLOW-UP IN ONE MONTH.
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
## 2021-06-11 NOTE — TELEPHONE ENCOUNTER
Attempted to call pt x4 for VV today with Dr. Bonner. No answer. Message left to call back to reschedule.

## 2021-06-12 NOTE — PROGRESS NOTES
48 yo obese male with a 5.4 cm ASCENDING thoracic aneurysm involving the root and the arch. Pt med Hx includes HTN, HLD, Gout and chest pain w/ some heart failure. PT NEEDS TO SEE CARDIOLOGY.
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
## 2021-06-12 NOTE — PROGRESS NOTES
Mr. Roth was erroneously sent to my clinic for his 5.4 cm arch aortic aneurysm.  I relayed his imaging to Dr. Preston who agreed that this patient needs to be seen by him in his clinic.  On review he had not had an echocardiogram this past year so I ordered one to expedite the visit.  I apologized to the patient for this error and we plan not to bill for 2 days visit.
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
## 2021-06-17 NOTE — PATIENT INSTRUCTIONS - HE
Patient Instructions by Mann Gutierrez RN at 1/23/2020  2:00 PM     Author: Mann Gutierrez RN Service: -- Author Type: Registered Nurse    Filed: 1/23/2020  2:54 PM Encounter Date: 1/23/2020 Status: Addendum    : Mann Gutierrez RN (Registered Nurse)    Related Notes: Original Note by Mann Gutierrez RN (Registered Nurse) filed at 1/23/2020  2:49 PM       Please control your blood pressure and keep under 120 for the top number. Stay on Norvasc.     Patient Education     Established High Blood Pressure    High blood pressure (hypertension) is a chronic disease. Often, healthcare providers dont know what causes it. But it can be caused by certain health conditions and medicines.  If you have high blood pressure, you may not have any symptoms. If you do have symptoms, they may include headache, dizziness, changes in your vision, chest pain, and shortness of breath. But even without symptoms, high blood pressure thats not treated raises your risk for heart attack, heart failure, and stroke. High blood pressure is a serious health risk and shouldnt be ignored.  Blood pressure measurements are given as 2 numbers. Systolic blood pressure is the upper number. This is the pressure when the heart contracts. Diastolic blood pressure is the lower number. This is the pressure when the heart relaxes between beats. You will see your blood pressure readings written together. For example, a person with a systolic pressure of 118 and a diastolic pressure of 78 will have 118/78 written in the medical record.  Blood pressure is categorized as normal, elevated, or stage 1 or stage 2 high blood pressure:    Normal blood pressure is systolic of less than 120 and diastolic of less than 80 (120/80)    Elevated blood pressure is systolic of 120 to 129 and diastolic less than 80    Stage 1 high blood pressure is systolic is 130 to 139 or diastolic between 80 to 89    Stage 2 high blood pressure is when systolic is 140 or higher or the diastolic is 90  or higher  Home care  If you have high blood pressure, follow these home care guidelines to help lower your blood pressure. If you are taking medicines for high blood pressure, these methods may reduce or end your need for medicines in the future.    Start a weight-loss program if you are overweight.    Cut back on how much salt you get in your diet. Heres how to do this:  ? Dont eat foods that have a lot of salt. These include olives, pickles, smoked meats, and salted potato chips.  ? Dont add salt to your food at the table.  ? Use only small amounts of salt when cooking.    Start an exercise program. Talk with your healthcare provider about the type of exercise program that would be best for you. It doesn't have to be hard. Even brisk walking for 20 minutes 3 times a week is a good form of exercise.    Dont take medicines that stimulate the heart. This includes many over-the-counter cold and sinus decongestant pills and sprays, as well as diet pills. Check the warnings about high blood pressure on the label. Before buying any over-the-counter medicines or supplements, always ask the pharmacist about the product's potential interaction with your high blood pressure and your high blood pressure medicines.    Stimulants such as amphetamine or cocaine could be deadly for someone with high blood pressure. Never take these.    Limit how much caffeine you get in your diet. Switch to caffeine-free products.    Stop smoking. If you are a long-time smoker, this can be hard. Talk to your healthcare provider about medicines and nicotine replacement options to help you. Also, enroll in a stop-smoking program to make it more likely that you will quit for good.    Learn how to handle stress. This is an important part of any program to lower blood pressure. Learn about relaxation methods like meditation, yoga, or biofeedback.    If your provider prescribed medicines, take them exactly as directed. Missing doses may cause your  blood pressure get out of control.    If you miss a dose or doses, check with your healthcare provider or pharmacist about what to do.    Consider buying an automatic blood pressure machine to check your blood pressure at home. Ask your provider for a recommendation. You can get one of these at most pharmacies.     The American Heart Association recommends the following guidelines for home blood pressure monitoring:    Don't smoke or drink coffee for 30 minutes before taking your blood pressure.    Go to the bathroom before the test.    Relax for 5 minutes before taking the measurement.    Sit with your back supported (don't sit on a couch or soft chair); keep your feet on the floor uncrossed. Place your arm on a solid flat surface (like a table) with the upper part of the arm at heart level. Place the middle of the cuff directly above the bend of the elbow. Check the monitor's instruction manual for an illustration.    Take multiple readings. When you measure, take 2 to 3 readings one minute apart and record all of the results.    Take your blood pressure at the same time every day, or as your healthcare provider recommends.    Record the date, time, and blood pressure reading.    Take the record with you to your next medical appointment. If your blood pressure monitor has a built-in memory, simply take the monitor with you to your next appointment.    Call your provider if you have several high readings. Don't be frightened by a single high blood pressure reading, but if you get several high readings, check in with your healthcare provider.    Note: When blood pressure reaches a systolic (top number) of 180 or higher OR diastolic (bottom number) of 110 or higher, seek emergency medical treatment.  Follow-up care  You will need to see your healthcare provider regularly. This is to check your blood pressure and to make changes to your medicines. Make a follow-up appointment as directed. Bring the record of your home  "blood pressure readings to the appointment.  When to seek medical advice  Call your healthcare provider right away if any of these occur:    Blood pressure reaches a systolic (upper number) of 180 or higher OR a diastolic (bottom number) of 110 or higher    Chest pain or shortness of breath    Severe headache    Throbbing or rushing sound in the ears    Nosebleed    Sudden severe pain in your belly (abdomen)    Extreme drowsiness, confusion, or fainting    Dizziness or spinning sensation (vertigo)    Weakness of an arm or leg or one side of the face    You have problems speaking or seeing   Date Last Reviewed: 12/1/2016 2000-2017 "[employer]. [address] [place] [address]. All rights reserved. This information is not intended as a substitute for professional medical care. Always follow your healthcare professional's instructions.         Patient Education     CT Scan     During the test, relax and remain as still as you can.   CT scan is a test that combines X-rays and computer scans. The result is a detailed picture that can show problems with soft tissues (such as the lining of your sinuses), organs (such as your kidneys or lungs), blood vessels, and bones.  Tell the technologist   Be sure to tell the technologist if you:    Have allergies or kidney problems    Take diabetes medicine    Are pregnant or think you may be    Ate or drank anything before the test  Before your test    Be sure to tell your healthcare provider if you have ever had a reaction to contrast material (\"X-ray dye\"). If you have had a reaction, you may need to take medicine before your scan, so be sure to tell your provider ahead of time.     Be sure to mention the medicines you take. Ask if it's OK to take them before the test.     Follow any directions youre given for not eating or drinking before the procedure. Your provider will give you instructions if required. You may be required to drink contrast by mouth " before arriving for the study depending on the type of exam you are having. Your provider or the imaging site will provide this for you.    The length of the procedure may vary, depending on your condition and your provider's practices.    Arrive on time to check in.    When you arrive, you may be asked to change into a hospital gown. Remove all metal near the part of your body that will be scanned, including jewelry, eyeglasses, and dentures. Women may need to remove any bra that has metal underwire.     During your test    You may be given contrast through an IV (intravenous) line or by mouth.    You will lie on a table. The table slides into the CT scanner.    The technologist will ask you to hold your breath for a few seconds during your scan.    After your test    You can go back to your normal diet and activities right away. Any contrast will pass naturally through your body within a day.    Before leaving, you may need to wait briefly while your images are being reviewed. Your healthcare provider will discuss the test results with you during a follow-up appointment or over the phone.    Your next appointment is:__________________    Date Last Reviewed: 6/1/2019 2000-2019 [employer]. [address] [place] [address], [place] [address]. All rights reserved. This information is not intended as a substitute for professional medical care. Always follow your healthcare professional's instructions.
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
## 2021-06-18 NOTE — LETTER
Letter by Titi Griffin MD at      Author: Titi Griffin MD Service: -- Author Type: --    Filed:  Encounter Date: 3/4/2019 Status: (Other)       March 4, 2019     Patient: Yadiel Roth   YOB: 1970   Date of Visit: 3/4/2019       To Whom It May Concern:    It is my medical opinion that Yadiel Roth may return to work on March 5, 2019.  Please excuse from work February 28 - March 4 due to abdominal issues and diarrhea.    If you have any questions or concerns, please don't hesitate to call.    Sincerely,        Electronically signed by Titi Griffin MD
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
## 2021-06-19 NOTE — LETTER
Letter by Titi Griffin MD at      Author: Titi Griffin MD Service: -- Author Type: --    Filed:  Encounter Date: 9/18/2019 Status: (Other)         Yadiel Zu[address]iga  [place] [address]             September 18, 2019         Dear Mr. Roth,    Below are the results from your recent visit: No excess protein in the urine.  Kidney tests are normal.  Dramatic improvement in your diabetes, the A1c is now at 6.3, at this point I would not put you on any medication for this.  Cholesterol shows elevation of triglycerides and slightly low HDL, focus on good diet and exercise for this.    Resulted Orders   Microalbumin, Random Urine   Result Value Ref Range    Microalbumin, Random Urine 1.59 0.00 - 1.99 mg/dL    Creatinine, Urine 188.0 mg/dL    Microalbumin/Creatinine Ratio Random Urine 8.5 <=19.9 mg/g    Narrative    Microalbumin, Random Urine  <2.0 mg/dL . . . . . . . . Normal  3.0-30.0 mg/dL . . . . . . Microalbuminuria  >30.0 mg/dL . . . . . .  . Clinical Proteinuria    Microalbumin/Creatinine Ratio, Random Urine  <20 mg/g . . . . .. . . . Normal   mg/g . . . . . . . Microalbuminuria  >300 mg/g . . . . . . . . Clinical Proteinuria       Basic Metabolic Panel   Result Value Ref Range    Sodium 141 136 - 145 mmol/L    Potassium 4.2 3.5 - 5.0 mmol/L    Chloride 108 (H) 98 - 107 mmol/L    CO2 23 22 - 31 mmol/L    Anion Gap, Calculation 10 5 - 18 mmol/L    Glucose 98 70 - 125 mg/dL    Calcium 9.6 8.5 - 10.5 mg/dL    BUN 14 8 - 22 mg/dL    Creatinine 1.16 0.70 - 1.30 mg/dL    GFR MDRD Af Amer >60 >60 mL/min/1.73m2    GFR MDRD Non Af Amer >60 >60 mL/min/1.73m2    Narrative    Fasting Glucose reference range is 70-99 mg/dL per  American Diabetes Association (ADA) guidelines.   Glycosylated Hemoglobin A1c   Result Value Ref Range    Hemoglobin A1c 6.3 (H) 3.5 - 6.0 %   Lipid Cascade   Result Value Ref Range    Cholesterol 184 <=199 mg/dL    Triglycerides 223 (H) <=149 mg/dL    HDL Cholesterol  38 (L) >=40 mg/dL    LDL Calculated 101 <=129 mg/dL    Patient Fasting > 8hrs? Yes             Please call with questions or contact us using [employer].    Sincerely,        Electronically signed by Titi Griffin MD

## 2021-06-19 NOTE — LETTER
Letter by Titi Griffin MD at      Author: Titi Griffin MD Service: -- Author Type: --    Filed:  Encounter Date: 3/20/2019 Status: (Other)         Yadiel Roth  [address]  [place] [address]             March 20, 2019         Dear Mr. Roth,    Below are the results from your recent visit: CT scan of the head is completely normal, of course is reassuring that the headaches are not caused by something going on within the brain or skull.    Resulted Orders   CT Head Without Contrast    Narrative    EXAM: CT HEAD WO CONTRAST  LOCATION: [facility]  DATE/TIME: 3/20/2019 8:35 AM    INDICATION: New onset bilateral temple headaches, light senstivie,  COMPARISON: None.  TECHNIQUE: Routine without IV contrast. Multiplanar reformats. Dose reduction techniques were used.    FINDINGS:  INTRACRANIAL CONTENTS: No intracranial hemorrhage, extraaxial collection, or mass effect.  No CT evidence of acute infarct. Normal parenchymal density for age. The ventricles and sulci are normal for age.     VISUALIZED ORBITS/SINUSES/MASTOIDS: No significant orbital abnormality. No significant paranasal sinus mucosal disease. No significant middle ear or mastoid effusion.    OSSEOUS STRUCTURES/SOFT TISSUES: No significant abnormality.      Impression    CONCLUSION:  1.  Normal for age.  2.  No CT finding of a mass, infarct or hemorrhage.            Please call with questions or contact us using [employer].    Sincerely,        Electronically signed by Titi Griffin MD

## 2021-06-19 NOTE — LETTER
Letter by Titi Griffin MD at      Author: Titi Griffin MD Service: -- Author Type: --    Filed:  Encounter Date: 3/31/2019 Status: (Other)         Yadiel Zu[address]iga  [place] [address]             March 31, 2019         Dear Mr. Roth,    Below are the results from your recent visit:  Kidney tests are normal, blood counts are also normal.      Resulted Orders   Basic Metabolic Panel   Result Value Ref Range    Sodium 140 136 - 145 mmol/L    Potassium 4.0 3.5 - 5.0 mmol/L    Chloride 104 98 - 107 mmol/L    CO2 25 22 - 31 mmol/L    Anion Gap, Calculation 11 5 - 18 mmol/L    Glucose 148 (H) 70 - 125 mg/dL    Calcium 9.8 8.5 - 10.5 mg/dL    BUN 9 8 - 22 mg/dL    Creatinine 1.11 0.70 - 1.30 mg/dL    GFR MDRD Af Amer >60 >60 mL/min/1.73m2    GFR MDRD Non Af Amer >60 >60 mL/min/1.73m2    Narrative    Fasting Glucose reference range is 70-99 mg/dL per  American Diabetes Association (ADA) guidelines.   HM2(CBC w/o Differential)   Result Value Ref Range    WBC 4.9 4.0 - 11.0 thou/uL    RBC 5.35 4.40 - 6.20 mill/uL    Hemoglobin 16.1 14.0 - 18.0 g/dL    Hematocrit 47.9 40.0 - 54.0 %    MCV 90 80 - 100 fL    MCH 30.1 27.0 - 34.0 pg    MCHC 33.6 32.0 - 36.0 g/dL    RDW 13.3 11.0 - 14.5 %    Platelets 271 140 - 440 thou/uL    MPV 6.7 (L) 7.0 - 10.0 fL         Please call with questions or contact us using [employer].    Sincerely,        Electronically signed by Titi Griffin MD

## 2021-06-19 NOTE — LETTER
Letter by Titi Griffin MD at      Author: Titi Griffin MD Service: -- Author Type: --    Filed:  Encounter Date: 8/7/2019 Status: (Other)         Yadiel Roth  [address]  [place] [address]             August 7, 2019         Dear Mr. Roth,    Our records indicate that you are due for your annual diabetic eye exam.  If you have already completed this exam within this year please help us get a copy of the exam so we can update your records. They can be faxed to [phone]. If you have not completed this yet this year, please call your eye clinic and schedule an appointment.    Our records indicate that you are do for a micro albumin.  If you have these labs done at another facility, please have them send us a copy at [phone].  If you have not had them completed yet, please use my chart or call the clinic at [phone] and schedule an appointment with your provider.    Please call with questions or contact us using [employer].    Sincerely,        Electronically signed by Titi Griffin MD

## 2021-06-19 NOTE — LETTER
Letter by Titi Griffin MD at      Author: Titi Griffin MD Service: -- Author Type: --    Filed:  Encounter Date: 3/20/2019 Status: (Other)         Yadiel Roth  [address] [place] [facility] [address]             March 20, 2019         Dear Mr. Roth,    Below are the results from your recent visit: The known aneurysm is now up to 5.7 cm this is an increase, at this size I do want you to go back and see cardiothoracic surgery to place that referral further evaluation as to whether or not you need surgical intervention.    Resulted Orders   CT Chest With Contrast    Narrative    EXAM: CT CHEST W CONTRAST  LOCATION: [facility]  DATE/TIME: 3/20/2019 8:39 AM    INDICATION: Known history of thoracic aneurysm  COMPARISON: 5/11/2018  TECHNIQUE: Helical images were obtained through the chest during injection of IV contrast. Multiplanar reformats were obtained. Dose reduction techniques were used.    IV CONTRAST: Iohexol (Omni) 100 mL    FINDINGS:   LUNGS AND PLEURA: Lungs are clear except for mild atelectasis surrounding the descending thoracic aorta. No pleural effusion    MEDIASTINUM: Mild apparent increase in dilatation involving the aneurysmal ascending aorta and aortic arch: Short axis dimension the level of the sinuses of Valsalva 3.8 cm; sinotubular junction 3.7 cm; Mid ascending aorta 5.7 cm; at the level of the   right brachiocephalic artery 5.7 cm; just beyond the origin of left subclavian artery 4.7 cm; mid descending 5.1 cm; low descending 3.7 cm.  No aortic dissection.    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 Normal-sized heart, no pericardial effusion.    LIMITED UPPER ABDOMEN: Fatty infiltration of liver. Visible abdominal aorta normal in caliber.    MUSCULOSKELETAL: Negative.      Impression    CONCLUSION:   1.  Mild increased dilatation of thoracic aorta with mid ascending aorta now measuring 5.7 cm.  2.  Moderate  dilatation of much of the remaining thoracic aorta stable or minimally more prominent            Please call with questions or contact us using MyChart.    Sincerely,        Electronically signed by Titi Griffin MD

## 2021-06-19 NOTE — LETTER
Letter by Titi Griffin MD at      Author: Titi Griffin MD Service: -- Author Type: --    Filed:  Encounter Date: 3/29/2019 Status: (Other)         Yadiel Roth  [address]  [place] [address]             March 29, 2019         Dear Mr. Roht,    Below are the results from your recent visit: EKG shows normal sinus rhythm, the not just that nonspecific, and is probably not at all significant and will not affect surgery.    Resulted Orders   Electrocardiogram Perform and Read   Result Value Ref Range    SYSTOLIC BLOOD PRESSURE  mmHg    DIASTOLIC BLOOD PRESSURE  mmHg    VENTRICULAR RATE 73 BPM    ATRIAL RATE 73 BPM    P-R INTERVAL 166 ms    QRS DURATION 90 ms    Q-T INTERVAL 390 ms    QTC CALCULATION (BEZET) 429 ms    P Axis 46 degrees    R AXIS -23 degrees    T AXIS 32 degrees    MUSE DIAGNOSIS       Normal sinus rhythm  Nonspecific T wave abnormality  Abnormal ECG  When compared with ECG of 23-APR-2016 09:03,  No significant change was found  Confirmed by RAMESH TERRAZAS, TWAN LOC:SJ ([id number]) on 3/29/2019 3:07:32 PM              Please call with questions or contact us using [employer].    Sincerely,        Electronically signed by Titi Griffin MD

## 2021-06-19 NOTE — LETTER
Letter by Titi Griffin MD at      Author: Titi Griffin MD Service: -- Author Type: --    Filed:  Encounter Date: 3/20/2019 Status: (Other)         Yadiel Roth  [address]  [place] [address]             March 20, 2019         Dear Mr. Roth,    Below are the results from your recent visit: The only lab abnormality decrease in CRP which is a marker of inflammation, has been elevated in the past is not clear to me if this represents some type of acute infection or inflammation, white count is normal and another marker of inflammation sed rate is also normal, this would indicate more likely something acute you may have some type of viral illness.    If on the other hand your headaches do continue even into next week then I think it is time for you to see a neurologist.  Let me know.    Resulted Orders   Basic Metabolic Panel   Result Value Ref Range    Sodium 140 136 - 145 mmol/L    Potassium 4.0 3.5 - 5.0 mmol/L    Chloride 103 98 - 107 mmol/L    CO2 28 22 - 31 mmol/L    Anion Gap, Calculation 9 5 - 18 mmol/L    Glucose 134 (H) 70 - 125 mg/dL    Calcium 9.9 8.5 - 10.5 mg/dL    BUN 12 8 - 22 mg/dL    Creatinine 1.24 0.70 - 1.30 mg/dL    GFR MDRD Af Amer >60 >60 mL/min/1.73m2    GFR MDRD Non Af Amer >60 >60 mL/min/1.73m2    Narrative    Fasting Glucose reference range is 70-99 mg/dL per  American Diabetes Association (ADA) guidelines.   HM2(CBC w/o Differential)   Result Value Ref Range    WBC 5.7 4.0 - 11.0 thou/uL    RBC 5.16 4.40 - 6.20 mill/uL    Hemoglobin 15.0 14.0 - 18.0 g/dL    Hematocrit 45.0 40.0 - 54.0 %    MCV 87 80 - 100 fL    MCH 29.1 27.0 - 34.0 pg    MCHC 33.4 32.0 - 36.0 g/dL    RDW 14.1 11.0 - 14.5 %    Platelets 256 140 - 440 thou/uL    MPV 6.5 (L) 7.0 - 10.0 fL   C-Reactive Protein   Result Value Ref Range    CRP 2.1 (H) 0.0 - 0.8 mg/dL   Erythrocyte Sedimentation Rate   Result Value Ref Range    Sed Rate 13 0 - 15 mm/hr            Please call with questions or contact us  using [employer]    Sincerely,        Electronically signed by Titi Griffin MD

## 2021-06-19 NOTE — LETTER
Letter by Titi Griffin MD at      Author: Titi Griffin MD Service: -- Author Type: --    Filed:  Encounter Date: 7/5/2019 Status: (Other)         07/05/19     Yadiel Roth  [address]  [place] [address]          Dear Yadiel,       Dr. Griffin has recommended you schedule a Medication Therapy Management (MTM) appointment. MTM is designed to help you get the most of out of your medications.     During an MTM appointment, you will meet with a specially trained pharmacist to review all of your medications, both prescription and over-the-counter. They will make sure your medications are the best choice for you, safe, and working well. The MTM pharmacist works together with you and your doctor to help you understand your medications, solve any problems related to your medications, and help you meet your health goals.     To make an appointment, please call the MTM scheduling line at [phone] and toll free [phone].      We look forward to hearing from you!    Sincerely,       Opal Nicolas, PharmD, BCACP  Medication Management Pharmacist  [facility]
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
## 2021-06-20 NOTE — LETTER
Letter by Titi Griffin MD at      Author: Titi Griffin MD Service: -- Author Type: --    Filed:  Encounter Date: 12/30/2019 Status: Signed         Yadiel [address]  [place] [address]             December 30, 2019         Dear Mr. Roth,    Below are the results from your recent visit: Diabetes remains extremely well controlled A1c at 6.1, currently you do not need to be on any medication for this focus on good diet and exercise.    Your cholesterol is elevated, in the past you have been on a medication called atorvastatin, but I want you to get back on that and I refax it into your pharmacy.    Kidney test called creatinine is slightly elevated at 1.36, this is just a slight elevation it may not be significant but will continue to follow and monitor.    Blood counts are normal, no anemia.    Resulted Orders   HM2(CBC w/o Differential)   Result Value Ref Range    WBC 4.3 4.0 - 11.0 thou/uL    RBC 4.99 4.40 - 6.20 mill/uL    Hemoglobin 14.5 14.0 - 18.0 g/dL    Hematocrit 44.2 40.0 - 54.0 %    MCV 89 80 - 100 fL    MCH 29.0 27.0 - 34.0 pg    MCHC 32.8 32.0 - 36.0 g/dL    RDW 15.0 (H) 11.0 - 14.5 %    Platelets 269 140 - 440 thou/uL    MPV 6.8 (L) 7.0 - 10.0 fL   Comprehensive Metabolic Panel   Result Value Ref Range    Sodium 142 136 - 145 mmol/L    Potassium 4.1 3.5 - 5.0 mmol/L    Chloride 109 (H) 98 - 107 mmol/L    CO2 22 22 - 31 mmol/L    Anion Gap, Calculation 11 5 - 18 mmol/L    Glucose 103 70 - 125 mg/dL    BUN 12 8 - 22 mg/dL    Creatinine 1.36 (H) 0.70 - 1.30 mg/dL    GFR MDRD Af Amer >60 >60 mL/min/1.73m2    GFR MDRD Non Af Amer 55 (L) >60 mL/min/1.73m2    Bilirubin, Total 0.3 0.0 - 1.0 mg/dL    Calcium 9.1 8.5 - 10.5 mg/dL    Protein, Total 7.4 6.0 - 8.0 g/dL    Albumin 3.7 3.5 - 5.0 g/dL    Alkaline Phosphatase 94 45 - 120 U/L    AST 19 0 - 40 U/L    ALT 23 0 - 45 U/L    Narrative    Fasting Glucose reference range is 70-99 mg/dL per  American Diabetes Association (ADA)  guidelines.   Lipid Cascade   Result Value Ref Range    Cholesterol 207 (H) <=199 mg/dL    Triglycerides 219 (H) <=149 mg/dL    HDL Cholesterol 29 (L) >=40 mg/dL    LDL Calculated 134 (H) <=129 mg/dL    Patient Fasting > 8hrs? Yes    Glycosylated Hemoglobin A1c   Result Value Ref Range    Hemoglobin A1c 6.1 (H) 3.5 - 6.0 %            Please call with questions or contact us using TeleFl[employer].    Sincerely,        Electronically signed by Titi Griffin MD

## 2021-06-20 NOTE — LETTER
Letter by Robert Bah MD at      Author: Robert Bah MD Service: -- Author Type: --    Filed:  Encounter Date: 12/20/2019 Status: Signed         Yadiel Roth  [address]  [place] [address]    December 20, 2019    Dear Mr. Roth,    Welcome to [facility]! Your appointment information is below.   Please bring the following to your appointment:    Insurance Card, so we may scan it for our records    Drivers license or valid ID, so we may scan it for our records    Co-pay (as applicable per your insurance plan)    A current list of your medications including over the counter products such as vitamins and supplements    Your medical records including copies of X-Ray films if you are transferring your care from another clinic.  If you do not have your records, please fill out the release of information form and we will request those records.     Provider: Robert Bah MD  Appointment Date: January 29, 2020  Arrival Time: 9:00AM for PFT and 10:00AM for Consult    Location: [facility]         [address]        [facility] [place], [address]    **Please allow adequate time for your commute and parking. If you are more than 10 minutes late, you may be asked to reschedule.     If you need to cancel or reschedule your appointment, please notify us at least 24 hours prior to your appointment time so we are able to make this time available for another patient.    Thank you for choosing the [facility] for your health care needs. If you have any questions, please do not hesitate to contact us at any time at   [phone]. We look forward to caring for you.     Sincerely,     [facility] staff
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
## 2021-06-24 NOTE — TELEPHONE ENCOUNTER
Who is calling:  Patient  Reason for Call:  Patient started a new medication at [facility] 2/27/2019 and it had side effects that he has been unable to return to work as planned last Thursday, 2/28/2019.    Patient was having abdominal pain and diarrhea from starting metoprolol causing him to not be able to make it to work 2/28/2019-3/4/2019.    Side effects have subsided but he needs a doctors note from being off the above dates or will need paperwork refilled out being he was suppose to return to work on 2/28/2019.    Patient does feel like he can go to work tomorrow.    Please let patient know if Titi Griffin MD can generate note for him and he will  the letter at the clinic.   Date of last appointment with primary care: 2/27/2019  Has the patient been recently seen:  Yes  Okay to leave a detailed message: Yes
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
## 2021-07-03 NOTE — ANESTHESIA PREPROCEDURE EVALUATION
Anesthesia Preprocedure Evaluation by Jocelyn Greco MD at 6/7/2019 12:52 PM     Author: Jocelyn Greco MD Service: -- Author Type: Physician    Filed: 6/9/2019  5:51 PM Date of Service: 6/7/2019 12:52 PM Status: Signed    : Jocelyn Greco MD (Physician)       Anesthesia Evaluation      Patient summary reviewed   No history of anesthetic complications     Airway   Mallampati: III  Neck ROM: full   Pulmonary     breath sounds clear to auscultation  (+) sleep apnea (sleep study 1 yr ago inconclusive),                          Cardiovascular   Exercise tolerance: > or = 4 METS  (+) hypertension, , hypercholesterolemia,     Rhythm: regular        Neuro/Psych    (+) neuromuscular disease (B/L UE and LE paresthesias intermittently - thought likely due to aortic aneurysm),      Comments: Headaches - improved with BP control    Endo/Other    (+) arthritis (gout), obesity,      GI/Hepatic/Renal    (-) GERD     Other findings:   ECHO 6/5/19:    Summary       Normal left ventricular size. Mild left ventricular hypertrophy.    Left ventricle ejection fraction is lower limits of normal. The calculated left ventricular ejection fraction is 55%.    Normal right ventricular size and systolic function.    Moderate to severe enlargement of the aortic root,ascending aorta and transverse aorta. The ascending aorta measures approximately 5.1 to 5.4 cm. Aortic arch measures approximately 5.5 to 5.7 cm.    No significant valvular abnormality noted.    When compared to the previous study dated 9/11/2017, no obvious significant change.    Consider CT or MRI to better define the thoracic aorta.      Cath 5/2/19:    Conclusion          The LM vessel was moderate and is considered normal.    Estimated blood loss was <20 ml.    The LAD vessel was moderate and is considered normal .    The left circumflex was moderate and is considered normal.    The RCA was moderate and is considered normal.      Results for SUSI PADILLA  DAVID (MRN [id number])    6/7/2019 08:58  Sodium: 138  Potassium: 4.4  Chloride: 103  CO2: 25  Anion Gap, Calculation: 10  BUN: 8  Creatinine: 1.10  GFR MDRD Af Amer: >60  GFR MDRD Non Af Amer: >60  Calcium: 9.7  Magnesium: 2.1  Glucose: 276 (H)  Hemoglobin A1c: 8.9 (H)  Prealbumin: 21.8  INR: 0.96  WBC: 5.7  RBC: 5.12  Hemoglobin: 15.0  Hematocrit: 45.1  MCV: 88  MCH: 29.3  MCHC: 33.3  RDW: 13.4  Platelets: 247  MPV: 8.8  ABORh: O POS  Antibody Screen: Negative        Dental    (+) poor dentition                           Anesthesia Plan  Planned anesthetic: general endotracheal      SANDEEP  CVC  PAC  A-line    ASA 4   Induction: intravenous   Anesthetic plan and risks discussed with: patient  Anesthesia plan special considerations: antiemetics, CVP line, arterial catheterization, pulmonary artery catheterization,   Post-op plan: extended intubation/vent support

## 2021-07-14 PROBLEM — J96.00 ARF (ACUTE RESPIRATORY FAILURE) (H): Status: RESOLVED | Noted: 2019-06-10 | Resolved: 2019-07-02

## 2021-08-03 PROBLEM — I71.20 THORACIC AORTIC ANEURYSM WITHOUT RUPTURE (H): Status: RESOLVED | Noted: 2017-05-26 | Resolved: 2017-08-15

## 2021-08-03 PROBLEM — D68.9 COAGULOPATHY (H): Status: RESOLVED | Noted: 2019-06-10 | Resolved: 2019-07-02

## 2021-08-14 ENCOUNTER — HEALTH MAINTENANCE LETTER (OUTPATIENT)
Age: 51
End: 2021-08-14

## 2021-10-04 ENCOUNTER — HEALTH MAINTENANCE LETTER (OUTPATIENT)
Age: 51
End: 2021-10-04

## 2022-03-20 ENCOUNTER — HEALTH MAINTENANCE LETTER (OUTPATIENT)
Age: 52
End: 2022-03-20

## 2022-09-11 ENCOUNTER — HEALTH MAINTENANCE LETTER (OUTPATIENT)
Age: 52
End: 2022-09-11

## 2022-10-13 NOTE — TELEPHONE ENCOUNTER
Patient was contacted to schedule surgery.    Provider:   Location: [place]  Date: 12/31/2019  Time of arrival: 0600    Pre-op instructions and confirmation sheet mailed     Never

## 2022-12-25 NOTE — TELEPHONE ENCOUNTER
Code MONIQUE New Appointment Needed  What is the reason for the visit:    follow up on fmla paperwork  Provider Preference: PCP only  How soon do you need to be seen?: 3/29, doctor notes from appointments he had on 1/22,3/19,needs paperwork filled out by 4/1.  Waitlist offered?: No  Okay to leave a detailed message:  Yes

## 2023-10-07 ENCOUNTER — HEALTH MAINTENANCE LETTER (OUTPATIENT)
Age: 53
End: 2023-10-07

## 2024-07-09 ENCOUNTER — OFFICE VISIT (OUTPATIENT)
Dept: FAMILY MEDICINE | Facility: CLINIC | Age: 54
End: 2024-07-09
Payer: COMMERCIAL

## 2024-07-09 VITALS
TEMPERATURE: 98 F | BODY MASS INDEX: 36.77 KG/M2 | WEIGHT: 249 LBS | SYSTOLIC BLOOD PRESSURE: 122 MMHG | OXYGEN SATURATION: 97 % | DIASTOLIC BLOOD PRESSURE: 100 MMHG

## 2024-07-09 DIAGNOSIS — E66.01 MORBID OBESITY (H): ICD-10-CM

## 2024-07-09 DIAGNOSIS — G43.909 MIGRAINE WITHOUT STATUS MIGRAINOSUS, NOT INTRACTABLE, UNSPECIFIED MIGRAINE TYPE: ICD-10-CM

## 2024-07-09 DIAGNOSIS — K76.0 FATTY LIVER: ICD-10-CM

## 2024-07-09 DIAGNOSIS — Z98.890 S/P AORTIC ANEURYSM REPAIR: ICD-10-CM

## 2024-07-09 DIAGNOSIS — G56.00 CARPAL TUNNEL SYNDROME, UNSPECIFIED LATERALITY: ICD-10-CM

## 2024-07-09 DIAGNOSIS — E11.69 TYPE 2 DIABETES MELLITUS WITH OTHER SPECIFIED COMPLICATION, WITHOUT LONG-TERM CURRENT USE OF INSULIN (H): ICD-10-CM

## 2024-07-09 DIAGNOSIS — M50.30 DEGENERATIVE CERVICAL DISC: ICD-10-CM

## 2024-07-09 DIAGNOSIS — Z86.79 HISTORY OF REPAIR OF THORACIC AORTIC ANEURYSM: ICD-10-CM

## 2024-07-09 DIAGNOSIS — Z98.890 HISTORY OF REPAIR OF THORACIC AORTIC ANEURYSM: ICD-10-CM

## 2024-07-09 DIAGNOSIS — I71.21 ANEURYSM OF ASCENDING AORTA WITHOUT RUPTURE (H): ICD-10-CM

## 2024-07-09 DIAGNOSIS — I10 ESSENTIAL HYPERTENSION: ICD-10-CM

## 2024-07-09 DIAGNOSIS — E78.2 MIXED HYPERLIPIDEMIA: Primary | ICD-10-CM

## 2024-07-09 DIAGNOSIS — I51.7 LVH (LEFT VENTRICULAR HYPERTROPHY): ICD-10-CM

## 2024-07-09 DIAGNOSIS — M10.9 GOUT, UNSPECIFIED CAUSE, UNSPECIFIED CHRONICITY, UNSPECIFIED SITE: ICD-10-CM

## 2024-07-09 DIAGNOSIS — J38.00 VOCAL CORD PARALYSIS: ICD-10-CM

## 2024-07-09 DIAGNOSIS — Z86.79 S/P AORTIC ANEURYSM REPAIR: ICD-10-CM

## 2024-07-09 PROBLEM — E78.5 HYPERLIPIDEMIA: Status: ACTIVE | Noted: 2019-12-30

## 2024-07-09 PROBLEM — E11.9 DIABETES MELLITUS, TYPE 2 (H): Status: ACTIVE | Noted: 2019-07-02

## 2024-07-09 PROCEDURE — 99204 OFFICE O/P NEW MOD 45 MIN: CPT | Performed by: FAMILY MEDICINE

## 2024-07-09 PROCEDURE — G2211 COMPLEX E/M VISIT ADD ON: HCPCS | Performed by: FAMILY MEDICINE

## 2024-07-09 RX ORDER — AMLODIPINE BESYLATE 10 MG/1
10 TABLET ORAL EVERY EVENING
Qty: 90 TABLET | Refills: 0 | Status: SHIPPED | OUTPATIENT
Start: 2024-07-09

## 2024-07-09 RX ORDER — METOPROLOL TARTRATE 100 MG
100 TABLET ORAL 2 TIMES DAILY
Qty: 180 TABLET | Refills: 0 | Status: SHIPPED | OUTPATIENT
Start: 2024-07-09

## 2024-07-09 RX ORDER — ATORVASTATIN CALCIUM 40 MG/1
40 TABLET, FILM COATED ORAL AT BEDTIME
Qty: 90 TABLET | Refills: 0 | Status: SHIPPED | OUTPATIENT
Start: 2024-07-09

## 2024-07-09 RX ORDER — ALLOPURINOL 100 MG/1
100 TABLET ORAL DAILY
Qty: 90 TABLET | Refills: 0 | Status: SHIPPED | OUTPATIENT
Start: 2024-07-09

## 2024-07-09 NOTE — PROGRESS NOTES
Assessment & Plan     (E78.2) Hyperlipidemia  (primary encounter diagnosis)  Comment: Patient was previously on atorvastatin  Plan: atorvastatin (LIPITOR) 40 MG tablet             (M10.9) Gout, unspecified cause, unspecified chronicity, unspecified site  Comment: Allopurinol has control  Plan: allopurinol (ZYLOPRIM) 100 MG tablet             (Z98.890,  Z86.79) S/P aortic aneurysm repair  Comment: With this surgical repair control of hypertension is critical  Plan:      (I10) Essential hypertension  Comment: Suboptimal control  Plan: amLODIPine (NORVASC) 10 MG tablet, metoprolol         tartrate (LOPRESSOR) 100 MG tablet             (M50.30) Degenerative cervical disc  Comment: Chronic longstanding with occasional exacerbations  Plan:      (G56.00) Carpal tunnel syndrome, unspecified laterality  Comment: Noted previously  Plan:      (E11.69) Type 2 diabetes mellitus with other specified complication, without long-term current use of insulin (H)  Comment: Attempting to control this without medication  Plan:      (Z98.890,  Z86.79) History of repair of thoracic aortic aneurysm  Comment: As above  Plan:      (I71.21) Aneurysm of ascending aorta without rupture (H24)  Comment:    Plan:      PLAN:  1.  Medications renewed as above without changes.  2.  Of note the patient has not been taking any of his regular medications, for quite some time.  3.  The patient is going to check with his insurance about whether physical therapy or chiropractic is covered for his cervical disc disease.  4.  Patient is due for a physical in the next 2 to 3 months.  5.  The longitudinal plan of care for the diagnosis(es)/condition(s) as documented were addressed during this visit. Due to the added complexity in care, I will continue to support lois in the subsequent management and with ongoing continuity of care.                  Subjective   lois is a 54 year old, presenting for the following health issues:  Shoulder Problem (Left  shoulder ), Medication Refills, and Hand/wrist Stiffness (Fingers)      7/9/2024     7:33 AM   Additional Questions   Roomed by Roseann LOCKE CMA     History of Present Illness       Reason for visit:  Shoulder and neck pain on left side  Symptom onset:  3-4 weeks ago  Symptom intensity:  Moderate  Symptom progression:  Staying the same  Had these symptoms before:  No  What makes it worse:  Sleeping on left side or weight on that side  What makes it better:  Heat and ice or pain reliever    He eats 2-3 servings of fruits and vegetables daily.He consumes 3 sweetened beverage(s) daily.He exercises with enough effort to increase his heart rate 60 or more minutes per day.  He exercises with enough effort to increase his heart rate 3 or less days per week.   He is taking medications regularly.     Patient comes in for refill medication and to follow-up on some of his comorbidities.    Of note I have not seen the patient actually since 2019, he tried to stay off of basically all of his medication by trying to eat better exercise and so forth but he is of the opinion now that he really needs to be back on at least some of his medications including some blood pressure medication and other medications as listed.    Patient reports that one of the sources of his stress actually has this marriage they were  for quite some time they are back together but there is still some difficulties that they are having.    Patient has a longstanding history of cervical disc disease with radicular symptoms into the left arm it has been acting up for the past 6 weeks or so causing some pain or discomfort.  This is always been managed conservatively, I told the patient I would like him to check with his insurance to find out what benefits he has failed the physical therapy or chiropractic manipulation because I think either of them would be helpful.    Patient does have a significant amount of comorbidity including type 2 diabetes  mellitus he had a repair of a thoracic artery aneurysm among many other medical issues.                      Objective    BP (!) 122/100   Temp 98  F (36.7  C)   Wt 112.9 kg (249 lb)   SpO2 97%   BMI 36.77 kg/m    Body mass index is 36.77 kg/m .  Physical Exam               Signed Electronically by: Titi Griffin MD

## 2024-07-13 ENCOUNTER — HEALTH MAINTENANCE LETTER (OUTPATIENT)
Age: 54
End: 2024-07-13

## 2024-10-14 ENCOUNTER — APPOINTMENT (OUTPATIENT)
Dept: INTERVENTIONAL RADIOLOGY/VASCULAR | Facility: CLINIC | Age: 54
End: 2024-10-14
Attending: SURGERY
Payer: COMMERCIAL

## 2024-10-14 ENCOUNTER — OFFICE VISIT (OUTPATIENT)
Dept: FAMILY MEDICINE | Facility: CLINIC | Age: 54
End: 2024-10-14
Payer: COMMERCIAL

## 2024-10-14 ENCOUNTER — ANESTHESIA (OUTPATIENT)
Dept: SURGERY | Facility: CLINIC | Age: 54
End: 2024-10-14
Payer: COMMERCIAL

## 2024-10-14 ENCOUNTER — HOSPITAL ENCOUNTER (INPATIENT)
Facility: CLINIC | Age: 54
LOS: 4 days | Discharge: HOME | End: 2024-10-18
Attending: THORACIC SURGERY (CARDIOTHORACIC VASCULAR SURGERY) | Admitting: INTERNAL MEDICINE
Payer: COMMERCIAL

## 2024-10-14 ENCOUNTER — ANESTHESIA EVENT (OUTPATIENT)
Dept: SURGERY | Facility: CLINIC | Age: 54
End: 2024-10-14
Payer: COMMERCIAL

## 2024-10-14 ENCOUNTER — HOSPITAL ENCOUNTER (EMERGENCY)
Facility: CLINIC | Age: 54
Discharge: ANOTHER HOSPITAL | End: 2024-10-14
Attending: STUDENT IN AN ORGANIZED HEALTH CARE EDUCATION/TRAINING PROGRAM | Admitting: STUDENT IN AN ORGANIZED HEALTH CARE EDUCATION/TRAINING PROGRAM
Payer: COMMERCIAL

## 2024-10-14 ENCOUNTER — APPOINTMENT (OUTPATIENT)
Dept: CT IMAGING | Facility: CLINIC | Age: 54
End: 2024-10-14
Payer: COMMERCIAL

## 2024-10-14 ENCOUNTER — APPOINTMENT (OUTPATIENT)
Dept: GENERAL RADIOLOGY | Facility: CLINIC | Age: 54
End: 2024-10-14
Attending: SURGERY
Payer: COMMERCIAL

## 2024-10-14 ENCOUNTER — APPOINTMENT (OUTPATIENT)
Dept: CT IMAGING | Facility: CLINIC | Age: 54
End: 2024-10-14
Attending: STUDENT IN AN ORGANIZED HEALTH CARE EDUCATION/TRAINING PROGRAM
Payer: COMMERCIAL

## 2024-10-14 VITALS
TEMPERATURE: 98.2 F | RESPIRATION RATE: 18 BRPM | HEART RATE: 112 BPM | SYSTOLIC BLOOD PRESSURE: 139 MMHG | BODY MASS INDEX: 35.15 KG/M2 | OXYGEN SATURATION: 98 % | WEIGHT: 238 LBS | DIASTOLIC BLOOD PRESSURE: 88 MMHG

## 2024-10-14 VITALS
HEIGHT: 69 IN | WEIGHT: 238 LBS | OXYGEN SATURATION: 95 % | RESPIRATION RATE: 18 BRPM | SYSTOLIC BLOOD PRESSURE: 130 MMHG | DIASTOLIC BLOOD PRESSURE: 92 MMHG | HEART RATE: 87 BPM | BODY MASS INDEX: 35.25 KG/M2 | TEMPERATURE: 97.8 F

## 2024-10-14 DIAGNOSIS — Z86.79 HISTORY OF REPAIR OF THORACIC AORTIC ANEURYSM: Primary | ICD-10-CM

## 2024-10-14 DIAGNOSIS — U07.1 INFECTION DUE TO 2019 NOVEL CORONAVIRUS: ICD-10-CM

## 2024-10-14 DIAGNOSIS — I71.11 RUPTURED ANEURYSM OF ASCENDING AORTA (H): ICD-10-CM

## 2024-10-14 DIAGNOSIS — U07.1 COVID-19: ICD-10-CM

## 2024-10-14 DIAGNOSIS — Z98.890 HISTORY OF REPAIR OF THORACIC AORTIC ANEURYSM: Primary | ICD-10-CM

## 2024-10-14 DIAGNOSIS — I71.23 ANEURYSM OF DESCENDING THORACIC AORTA WITHOUT RUPTURE (H): ICD-10-CM

## 2024-10-14 DIAGNOSIS — R00.0 TACHYCARDIA: ICD-10-CM

## 2024-10-14 DIAGNOSIS — I71.13 RUPTURED ANEURYSM OF DESCENDING THORACIC AORTA (H): ICD-10-CM

## 2024-10-14 DIAGNOSIS — I71.00 DISSECTION OF AORTA, UNSPECIFIED PORTION OF AORTA (H): ICD-10-CM

## 2024-10-14 DIAGNOSIS — R04.2 HEMOPTYSIS: Primary | ICD-10-CM

## 2024-10-14 LAB
ABO/RH(D): NORMAL
ACT BLD: 236 SECONDS (ref 74–150)
ACT BLD: 253 SECONDS (ref 74–150)
ACT BLD: 266 SECONDS (ref 74–150)
ALBUMIN SERPL BCG-MCNC: 3.7 G/DL (ref 3.5–5.2)
ALP SERPL-CCNC: 89 U/L (ref 40–150)
ALT SERPL W P-5'-P-CCNC: 22 U/L (ref 0–70)
ANION GAP SERPL CALCULATED.3IONS-SCNC: 10 MMOL/L (ref 7–15)
ANION GAP SERPL CALCULATED.3IONS-SCNC: 12 MMOL/L (ref 7–15)
ANTIBODY SCREEN: NEGATIVE
APTT PPP: 31 SECONDS (ref 22–38)
AST SERPL W P-5'-P-CCNC: 17 U/L (ref 0–45)
ATRIAL RATE - MUSE: 109 BPM
BASE EXCESS BLDA CALC-SCNC: 3.6 MMOL/L (ref -3–3)
BASE EXCESS BLDA CALC-SCNC: 4 MMOL/L (ref -3–3)
BASOPHILS # BLD AUTO: 0 10E3/UL (ref 0–0.2)
BASOPHILS NFR BLD AUTO: 0 %
BILIRUB SERPL-MCNC: 0.8 MG/DL
BLD PROD TYP BPU: NORMAL
BLOOD COMPONENT TYPE: NORMAL
BUN SERPL-MCNC: 10.9 MG/DL (ref 6–20)
BUN SERPL-MCNC: 11.2 MG/DL (ref 6–20)
CA-I BLD-MCNC: 4.4 MG/DL (ref 4.4–5.2)
CA-I BLD-MCNC: 4.5 MG/DL (ref 4.4–5.2)
CALCIUM SERPL-MCNC: 8.8 MG/DL (ref 8.8–10.4)
CALCIUM SERPL-MCNC: 9.2 MG/DL (ref 8.8–10.4)
CHLORIDE SERPL-SCNC: 102 MMOL/L (ref 98–107)
CHLORIDE SERPL-SCNC: 102 MMOL/L (ref 98–107)
CODING SYSTEM: NORMAL
CREAT SERPL-MCNC: 1.06 MG/DL (ref 0.67–1.17)
CREAT SERPL-MCNC: 1.06 MG/DL (ref 0.67–1.17)
CROSSMATCH: NORMAL
DIASTOLIC BLOOD PRESSURE - MUSE: NORMAL MMHG
EGFRCR SERPLBLD CKD-EPI 2021: 83 ML/MIN/1.73M2
EGFRCR SERPLBLD CKD-EPI 2021: 83 ML/MIN/1.73M2
EOSINOPHIL # BLD AUTO: 0.1 10E3/UL (ref 0–0.7)
EOSINOPHIL NFR BLD AUTO: 1 %
ERYTHROCYTE [DISTWIDTH] IN BLOOD BY AUTOMATED COUNT: 13.7 % (ref 10–15)
EST. AVERAGE GLUCOSE BLD GHB EST-MCNC: 137 MG/DL
FLUAV RNA SPEC QL NAA+PROBE: NEGATIVE
FLUBV RNA RESP QL NAA+PROBE: NEGATIVE
GLUCOSE BLD-MCNC: 104 MG/DL (ref 70–99)
GLUCOSE BLD-MCNC: 123 MG/DL (ref 70–99)
GLUCOSE BLDC GLUCOMTR-MCNC: 104 MG/DL (ref 70–99)
GLUCOSE SERPL-MCNC: 100 MG/DL (ref 70–99)
GLUCOSE SERPL-MCNC: 117 MG/DL (ref 70–99)
HBA1C MFR BLD: 6.4 %
HCO3 BLDA-SCNC: 28 MMOL/L (ref 21–28)
HCO3 BLDA-SCNC: 28 MMOL/L (ref 21–28)
HCO3 SERPL-SCNC: 28 MMOL/L (ref 22–29)
HCO3 SERPL-SCNC: 28 MMOL/L (ref 22–29)
HCT VFR BLD AUTO: 42 % (ref 40–53)
HGB BLD-MCNC: 11.4 G/DL (ref 13.3–17.7)
HGB BLD-MCNC: 12.1 G/DL (ref 13.3–17.7)
HGB BLD-MCNC: 12.7 G/DL (ref 13.3–17.7)
HGB BLD-MCNC: 13.9 G/DL (ref 13.3–17.7)
HOLD SPECIMEN: NORMAL
IMM GRANULOCYTES # BLD: 0 10E3/UL
IMM GRANULOCYTES NFR BLD: 0 %
INR PPP: 1.09 (ref 0.85–1.15)
INTERPRETATION ECG - MUSE: NORMAL
ISSUE DATE AND TIME: NORMAL
LACTATE BLD-SCNC: 1 MMOL/L (ref 0.7–2)
LACTATE BLD-SCNC: 1 MMOL/L (ref 0.7–2)
LYMPHOCYTES # BLD AUTO: 1.3 10E3/UL (ref 0.8–5.3)
LYMPHOCYTES NFR BLD AUTO: 18 %
MAGNESIUM SERPL-MCNC: 1.9 MG/DL (ref 1.7–2.3)
MAGNESIUM SERPL-MCNC: 2 MG/DL (ref 1.7–2.3)
MCH RBC QN AUTO: 28.4 PG (ref 26.5–33)
MCHC RBC AUTO-ENTMCNC: 33.1 G/DL (ref 31.5–36.5)
MCV RBC AUTO: 86 FL (ref 78–100)
MONOCYTES # BLD AUTO: 0.6 10E3/UL (ref 0–1.3)
MONOCYTES NFR BLD AUTO: 8 %
NEUTROPHILS # BLD AUTO: 5.1 10E3/UL (ref 1.6–8.3)
NEUTROPHILS NFR BLD AUTO: 72 %
NRBC # BLD AUTO: 0 10E3/UL
NRBC BLD AUTO-RTO: 0 /100
O2/TOTAL GAS SETTING VFR VENT: 50 %
O2/TOTAL GAS SETTING VFR VENT: 50 %
OXYHGB MFR BLDA: 94 % (ref 92–100)
OXYHGB MFR BLDA: 94 % (ref 92–100)
P AXIS - MUSE: 66 DEGREES
PCO2 BLDA: 37 MM HG (ref 35–45)
PCO2 BLDA: 43 MM HG (ref 35–45)
PH BLDA: 7.43 [PH] (ref 7.35–7.45)
PH BLDA: 7.48 [PH] (ref 7.35–7.45)
PHOSPHATE SERPL-MCNC: 3.2 MG/DL (ref 2.5–4.5)
PLATELET # BLD AUTO: 221 10E3/UL (ref 150–450)
PO2 BLDA: 74 MM HG (ref 80–105)
PO2 BLDA: 78 MM HG (ref 80–105)
POTASSIUM BLD-SCNC: 3.5 MMOL/L (ref 3.4–5.3)
POTASSIUM BLD-SCNC: 3.6 MMOL/L (ref 3.4–5.3)
POTASSIUM SERPL-SCNC: 3.8 MMOL/L (ref 3.4–5.3)
POTASSIUM SERPL-SCNC: 3.8 MMOL/L (ref 3.4–5.3)
PR INTERVAL - MUSE: 136 MS
PROT SERPL-MCNC: 6.9 G/DL (ref 6.4–8.3)
QRS DURATION - MUSE: 90 MS
QT - MUSE: 362 MS
QTC - MUSE: 487 MS
R AXIS - MUSE: 30 DEGREES
RADIOLOGIST FLAGS: ABNORMAL
RADIOLOGIST FLAGS: ABNORMAL
RBC # BLD AUTO: 4.89 10E6/UL (ref 4.4–5.9)
RSV RNA SPEC NAA+PROBE: NEGATIVE
SAO2 % BLDA: 96 % (ref 96–97)
SAO2 % BLDA: 96 % (ref 96–97)
SARS-COV-2 RNA RESP QL NAA+PROBE: POSITIVE
SODIUM BLD-SCNC: 143 MMOL/L (ref 135–145)
SODIUM BLD-SCNC: 143 MMOL/L (ref 135–145)
SODIUM SERPL-SCNC: 140 MMOL/L (ref 135–145)
SODIUM SERPL-SCNC: 142 MMOL/L (ref 135–145)
SPECIMEN EXPIRATION DATE: NORMAL
SYSTOLIC BLOOD PRESSURE - MUSE: NORMAL MMHG
T AXIS - MUSE: 78 DEGREES
TROPONIN T SERPL HS-MCNC: 10 NG/L
TROPONIN T SERPL HS-MCNC: 9 NG/L
TROPONIN T SERPL HS-MCNC: 9 NG/L
UNIT ABO/RH: NORMAL
UNIT NUMBER: NORMAL
UNIT STATUS: NORMAL
UNIT TYPE ISBT: 5100
VENTRICULAR RATE- MUSE: 109 BPM
WBC # BLD AUTO: 7.1 10E3/UL (ref 4–11)

## 2024-10-14 PROCEDURE — 999N000083 HC STATISTIC IR STAFF TIME IN THE OR

## 2024-10-14 PROCEDURE — 85730 THROMBOPLASTIN TIME PARTIAL: CPT

## 2024-10-14 PROCEDURE — C1769 GUIDE WIRE: HCPCS | Performed by: SURGERY

## 2024-10-14 PROCEDURE — 83735 ASSAY OF MAGNESIUM: CPT

## 2024-10-14 PROCEDURE — 85025 COMPLETE CBC W/AUTO DIFF WBC: CPT

## 2024-10-14 PROCEDURE — 71275 CT ANGIOGRAPHY CHEST: CPT | Mod: 26 | Performed by: STUDENT IN AN ORGANIZED HEALTH CARE EDUCATION/TRAINING PROGRAM

## 2024-10-14 PROCEDURE — 86923 COMPATIBILITY TEST ELECTRIC: CPT | Performed by: ANESTHESIOLOGY

## 2024-10-14 PROCEDURE — 99140 ANES COMP EMERGENCY COND: CPT | Performed by: ANESTHESIOLOGY

## 2024-10-14 PROCEDURE — 74174 CTA ABD&PLVS W/CONTRAST: CPT | Mod: 26 | Performed by: STUDENT IN AN ORGANIZED HEALTH CARE EDUCATION/TRAINING PROGRAM

## 2024-10-14 PROCEDURE — 250N000009 HC RX 250

## 2024-10-14 PROCEDURE — 71275 CT ANGIOGRAPHY CHEST: CPT | Mod: XS

## 2024-10-14 PROCEDURE — 85610 PROTHROMBIN TIME: CPT

## 2024-10-14 PROCEDURE — 33881 EVASC RPR TA NDGFT XCOV LSA: CPT

## 2024-10-14 PROCEDURE — 37252 INTRVASC US NONCORONARY 1ST: CPT | Mod: 62 | Performed by: SURGERY

## 2024-10-14 PROCEDURE — 272N000001 HC OR GENERAL SUPPLY STERILE: Performed by: SURGERY

## 2024-10-14 PROCEDURE — C1889 IMPLANT/INSERT DEVICE, NOC: HCPCS | Performed by: SURGERY

## 2024-10-14 PROCEDURE — C1725 CATH, TRANSLUMIN NON-LASER: HCPCS | Performed by: SURGERY

## 2024-10-14 PROCEDURE — 84100 ASSAY OF PHOSPHORUS: CPT

## 2024-10-14 PROCEDURE — 33881 EVASC RPR TA NDGFT XCOV LSA: CPT | Mod: 62 | Performed by: SURGERY

## 2024-10-14 PROCEDURE — 84484 ASSAY OF TROPONIN QUANT: CPT

## 2024-10-14 PROCEDURE — 250N000011 HC RX IP 250 OP 636: Mod: JZ | Performed by: STUDENT IN AN ORGANIZED HEALTH CARE EDUCATION/TRAINING PROGRAM

## 2024-10-14 PROCEDURE — 250N000025 HC SEVOFLURANE, PER MIN: Performed by: SURGERY

## 2024-10-14 PROCEDURE — 255N000002 HC RX 255 OP 636: Performed by: SURGERY

## 2024-10-14 PROCEDURE — 86900 BLOOD TYPING SEROLOGIC ABO: CPT | Performed by: STUDENT IN AN ORGANIZED HEALTH CARE EDUCATION/TRAINING PROGRAM

## 2024-10-14 PROCEDURE — 33881 EVASC RPR TA NDGFT XCOV LSA: CPT | Performed by: ANESTHESIOLOGY

## 2024-10-14 PROCEDURE — 36415 COLL VENOUS BLD VENIPUNCTURE: CPT | Performed by: STUDENT IN AN ORGANIZED HEALTH CARE EDUCATION/TRAINING PROGRAM

## 2024-10-14 PROCEDURE — 74174 CTA ABD&PLVS W/CONTRAST: CPT

## 2024-10-14 PROCEDURE — 93005 ELECTROCARDIOGRAM TRACING: CPT | Performed by: STUDENT IN AN ORGANIZED HEALTH CARE EDUCATION/TRAINING PROGRAM

## 2024-10-14 PROCEDURE — 96365 THER/PROPH/DIAG IV INF INIT: CPT | Mod: 59

## 2024-10-14 PROCEDURE — 250N000011 HC RX IP 250 OP 636

## 2024-10-14 PROCEDURE — 02VW3DZ RESTRICTION OF THORACIC AORTA, DESCENDING WITH INTRALUMINAL DEVICE, PERCUTANEOUS APPROACH: ICD-10-PCS | Performed by: SURGERY

## 2024-10-14 PROCEDURE — 99223 1ST HOSP IP/OBS HIGH 75: CPT

## 2024-10-14 PROCEDURE — 83036 HEMOGLOBIN GLYCOSYLATED A1C: CPT

## 2024-10-14 PROCEDURE — C2628 CATHETER, OCCLUSION: HCPCS | Performed by: SURGERY

## 2024-10-14 PROCEDURE — 370N000017 HC ANESTHESIA TECHNICAL FEE, PER MIN: Performed by: SURGERY

## 2024-10-14 PROCEDURE — 250N000011 HC RX IP 250 OP 636: Performed by: STUDENT IN AN ORGANIZED HEALTH CARE EDUCATION/TRAINING PROGRAM

## 2024-10-14 PROCEDURE — 99291 CRITICAL CARE FIRST HOUR: CPT | Mod: 25

## 2024-10-14 PROCEDURE — 75957 PR ENDOVASC REPAIR THOR AORTA EXCL SUBCLAVIAN: CPT | Mod: 26 | Performed by: SURGERY

## 2024-10-14 PROCEDURE — 83605 ASSAY OF LACTIC ACID: CPT

## 2024-10-14 PROCEDURE — 34713 PERQ ACCESS & CLSR FEM ART: CPT | Mod: 62 | Performed by: SURGERY

## 2024-10-14 PROCEDURE — C1894 INTRO/SHEATH, NON-LASER: HCPCS | Performed by: SURGERY

## 2024-10-14 PROCEDURE — 71275 CT ANGIOGRAPHY CHEST: CPT

## 2024-10-14 PROCEDURE — 85018 HEMOGLOBIN: CPT

## 2024-10-14 PROCEDURE — C1753 CATH, INTRAVAS ULTRASOUND: HCPCS | Performed by: SURGERY

## 2024-10-14 PROCEDURE — P9016 RBC LEUKOCYTES REDUCED: HCPCS | Performed by: ANESTHESIOLOGY

## 2024-10-14 PROCEDURE — 250N000009 HC RX 250: Performed by: SURGERY

## 2024-10-14 PROCEDURE — C1760 CLOSURE DEV, VASC: HCPCS | Performed by: SURGERY

## 2024-10-14 PROCEDURE — 85347 COAGULATION TIME ACTIVATED: CPT

## 2024-10-14 PROCEDURE — 80048 BASIC METABOLIC PNL TOTAL CA: CPT

## 2024-10-14 PROCEDURE — 82330 ASSAY OF CALCIUM: CPT

## 2024-10-14 PROCEDURE — 87637 SARSCOV2&INF A&B&RSV AMP PRB: CPT

## 2024-10-14 PROCEDURE — 99207 PR NO CHARGE LOS: CPT | Performed by: INTERNAL MEDICINE

## 2024-10-14 PROCEDURE — 258N000003 HC RX IP 258 OP 636

## 2024-10-14 PROCEDURE — C1887 CATHETER, GUIDING: HCPCS | Performed by: SURGERY

## 2024-10-14 PROCEDURE — 99140 ANES COMP EMERGENCY COND: CPT

## 2024-10-14 PROCEDURE — B24BZZ3 ULTRASONOGRAPHY OF HEART WITH AORTA, INTRAVASCULAR: ICD-10-PCS | Performed by: SURGERY

## 2024-10-14 PROCEDURE — C1773 RET DEV, INSERTABLE: HCPCS | Performed by: SURGERY

## 2024-10-14 PROCEDURE — 36415 COLL VENOUS BLD VENIPUNCTURE: CPT

## 2024-10-14 PROCEDURE — 82962 GLUCOSE BLOOD TEST: CPT

## 2024-10-14 PROCEDURE — 36200 PLACE CATHETER IN AORTA: CPT | Mod: GC | Performed by: SURGERY

## 2024-10-14 PROCEDURE — 99215 OFFICE O/P EST HI 40 MIN: CPT | Performed by: PHYSICIAN ASSISTANT

## 2024-10-14 PROCEDURE — 999N000181 XR SURGERY CARM FLUORO GREATER THAN 5 MIN W STILLS: Mod: TC

## 2024-10-14 PROCEDURE — 360N000086 HC SURGERY LEVEL 6 W/ FLUORO, PER MIN: Performed by: SURGERY

## 2024-10-14 PROCEDURE — C1768 GRAFT, VASCULAR: HCPCS | Performed by: SURGERY

## 2024-10-14 PROCEDURE — 250N000011 HC RX IP 250 OP 636: Performed by: SURGERY

## 2024-10-14 PROCEDURE — 93005 ELECTROCARDIOGRAM TRACING: CPT

## 2024-10-14 PROCEDURE — 200N000002 HC R&B ICU UMMC

## 2024-10-14 DEVICE — IMPLANTABLE DEVICE: Type: IMPLANTABLE DEVICE | Site: AORTA | Status: FUNCTIONAL

## 2024-10-14 RX ORDER — SODIUM CHLORIDE, SODIUM LACTATE, POTASSIUM CHLORIDE, CALCIUM CHLORIDE 600; 310; 30; 20 MG/100ML; MG/100ML; MG/100ML; MG/100ML
INJECTION, SOLUTION INTRAVENOUS CONTINUOUS PRN
Status: DISCONTINUED | OUTPATIENT
Start: 2024-10-14 | End: 2024-10-15

## 2024-10-14 RX ORDER — FENTANYL CITRATE 50 UG/ML
INJECTION, SOLUTION INTRAMUSCULAR; INTRAVENOUS PRN
Status: DISCONTINUED | OUTPATIENT
Start: 2024-10-14 | End: 2024-10-15

## 2024-10-14 RX ORDER — HYDROMORPHONE HCL IN WATER/PF 6 MG/30 ML
0.2 PATIENT CONTROLLED ANALGESIA SYRINGE INTRAVENOUS
Status: DISCONTINUED | OUTPATIENT
Start: 2024-10-14 | End: 2024-10-16

## 2024-10-14 RX ORDER — NICOTINE POLACRILEX 4 MG
15-30 LOZENGE BUCCAL
Status: DISCONTINUED | OUTPATIENT
Start: 2024-10-14 | End: 2024-10-18 | Stop reason: HOSPADM

## 2024-10-14 RX ORDER — HEPARIN SODIUM 1000 [USP'U]/ML
INJECTION, SOLUTION INTRAVENOUS; SUBCUTANEOUS PRN
Status: DISCONTINUED | OUTPATIENT
Start: 2024-10-14 | End: 2024-10-15

## 2024-10-14 RX ORDER — ESMOLOL HYDROCHLORIDE 20 MG/ML
50-300 INJECTION, SOLUTION INTRAVENOUS CONTINUOUS
Status: DISCONTINUED | OUTPATIENT
Start: 2024-10-14 | End: 2024-10-15

## 2024-10-14 RX ORDER — NALOXONE HYDROCHLORIDE 0.4 MG/ML
0.2 INJECTION, SOLUTION INTRAMUSCULAR; INTRAVENOUS; SUBCUTANEOUS
Status: DISCONTINUED | OUTPATIENT
Start: 2024-10-14 | End: 2024-10-18 | Stop reason: HOSPADM

## 2024-10-14 RX ORDER — IOPAMIDOL 755 MG/ML
75 INJECTION, SOLUTION INTRAVASCULAR ONCE
Status: COMPLETED | OUTPATIENT
Start: 2024-10-14 | End: 2024-10-14

## 2024-10-14 RX ORDER — COVID-19 ANTIGEN TEST
KIT MISCELLANEOUS
Status: ON HOLD | COMMUNITY
End: 2024-10-16

## 2024-10-14 RX ORDER — PROTAMINE SULFATE 10 MG/ML
INJECTION, SOLUTION INTRAVENOUS PRN
Status: DISCONTINUED | OUTPATIENT
Start: 2024-10-14 | End: 2024-10-15

## 2024-10-14 RX ORDER — PROPOFOL 10 MG/ML
INJECTION, EMULSION INTRAVENOUS PRN
Status: DISCONTINUED | OUTPATIENT
Start: 2024-10-14 | End: 2024-10-15

## 2024-10-14 RX ORDER — IODIXANOL 320 MG/ML
INJECTION, SOLUTION INTRAVASCULAR PRN
Status: DISCONTINUED | OUTPATIENT
Start: 2024-10-14 | End: 2024-10-15 | Stop reason: HOSPADM

## 2024-10-14 RX ORDER — ESMOLOL HYDROCHLORIDE 20 MG/ML
50-200 INJECTION, SOLUTION INTRAVENOUS CONTINUOUS
Status: DISCONTINUED | OUTPATIENT
Start: 2024-10-14 | End: 2024-10-14 | Stop reason: HOSPADM

## 2024-10-14 RX ORDER — NALOXONE HYDROCHLORIDE 0.4 MG/ML
0.4 INJECTION, SOLUTION INTRAMUSCULAR; INTRAVENOUS; SUBCUTANEOUS
Status: DISCONTINUED | OUTPATIENT
Start: 2024-10-14 | End: 2024-10-18 | Stop reason: HOSPADM

## 2024-10-14 RX ORDER — ACETAMINOPHEN 325 MG/1
650 TABLET ORAL EVERY 6 HOURS PRN
Status: DISCONTINUED | OUTPATIENT
Start: 2024-10-14 | End: 2024-10-18 | Stop reason: HOSPADM

## 2024-10-14 RX ORDER — IOPAMIDOL 755 MG/ML
90 INJECTION, SOLUTION INTRAVASCULAR ONCE
Status: COMPLETED | OUTPATIENT
Start: 2024-10-14 | End: 2024-10-14

## 2024-10-14 RX ORDER — METHOCARBAMOL 500 MG/1
500 TABLET, FILM COATED ORAL 4 TIMES DAILY PRN
Status: DISCONTINUED | OUTPATIENT
Start: 2024-10-14 | End: 2024-10-18 | Stop reason: HOSPADM

## 2024-10-14 RX ORDER — DEXTROSE MONOHYDRATE 25 G/50ML
25-50 INJECTION, SOLUTION INTRAVENOUS
Status: DISCONTINUED | OUTPATIENT
Start: 2024-10-14 | End: 2024-10-18 | Stop reason: HOSPADM

## 2024-10-14 RX ORDER — LIDOCAINE HYDROCHLORIDE 20 MG/ML
INJECTION, SOLUTION INFILTRATION; PERINEURAL PRN
Status: DISCONTINUED | OUTPATIENT
Start: 2024-10-14 | End: 2024-10-15

## 2024-10-14 RX ORDER — DEXAMETHASONE SODIUM PHOSPHATE 4 MG/ML
INJECTION, SOLUTION INTRA-ARTICULAR; INTRALESIONAL; INTRAMUSCULAR; INTRAVENOUS; SOFT TISSUE PRN
Status: DISCONTINUED | OUTPATIENT
Start: 2024-10-14 | End: 2024-10-15

## 2024-10-14 RX ORDER — CEFAZOLIN SODIUM/WATER 2 G/20 ML
SYRINGE (ML) INTRAVENOUS PRN
Status: DISCONTINUED | OUTPATIENT
Start: 2024-10-14 | End: 2024-10-15

## 2024-10-14 RX ADMIN — SODIUM CHLORIDE, POTASSIUM CHLORIDE, SODIUM LACTATE AND CALCIUM CHLORIDE: 600; 310; 30; 20 INJECTION, SOLUTION INTRAVENOUS at 20:26

## 2024-10-14 RX ADMIN — PROPOFOL 30 MG: 10 INJECTION, EMULSION INTRAVENOUS at 20:55

## 2024-10-14 RX ADMIN — PHENYLEPHRINE HYDROCHLORIDE 100 MCG: 10 INJECTION INTRAVENOUS at 21:04

## 2024-10-14 RX ADMIN — PHENYLEPHRINE HYDROCHLORIDE 50 MCG: 10 INJECTION INTRAVENOUS at 22:59

## 2024-10-14 RX ADMIN — ESMOLOL HYDROCHLORIDE IN SODIUM CHLORIDE 50 MCG/KG/MIN: 20 INJECTION INTRAVENOUS at 15:06

## 2024-10-14 RX ADMIN — SODIUM CHLORIDE, POTASSIUM CHLORIDE, SODIUM LACTATE AND CALCIUM CHLORIDE: 600; 310; 30; 20 INJECTION, SOLUTION INTRAVENOUS at 22:46

## 2024-10-14 RX ADMIN — MIDAZOLAM 2 MG: 1 INJECTION INTRAMUSCULAR; INTRAVENOUS at 20:37

## 2024-10-14 RX ADMIN — PHENYLEPHRINE HYDROCHLORIDE 100 MCG: 10 INJECTION INTRAVENOUS at 20:42

## 2024-10-14 RX ADMIN — PHENYLEPHRINE HYDROCHLORIDE 150 MCG: 10 INJECTION INTRAVENOUS at 21:51

## 2024-10-14 RX ADMIN — PHENYLEPHRINE HYDROCHLORIDE 200 MCG: 10 INJECTION INTRAVENOUS at 20:49

## 2024-10-14 RX ADMIN — Medication 100 MG: at 21:01

## 2024-10-14 RX ADMIN — Medication 2 G: at 21:04

## 2024-10-14 RX ADMIN — PHENYLEPHRINE HYDROCHLORIDE 100 MCG: 10 INJECTION INTRAVENOUS at 20:53

## 2024-10-14 RX ADMIN — SUCCINYLCHOLINE CHLORIDE 120 MG: 20 INJECTION, SOLUTION INTRAMUSCULAR; INTRAVENOUS; PARENTERAL at 20:37

## 2024-10-14 RX ADMIN — Medication 10 MG: at 22:10

## 2024-10-14 RX ADMIN — PHENYLEPHRINE HYDROCHLORIDE 100 MCG: 10 INJECTION INTRAVENOUS at 20:45

## 2024-10-14 RX ADMIN — PHENYLEPHRINE HYDROCHLORIDE 100 MCG: 10 INJECTION INTRAVENOUS at 23:57

## 2024-10-14 RX ADMIN — DEXAMETHASONE SODIUM PHOSPHATE 4 MG: 4 INJECTION, SOLUTION INTRA-ARTICULAR; INTRALESIONAL; INTRAMUSCULAR; INTRAVENOUS; SOFT TISSUE at 21:25

## 2024-10-14 RX ADMIN — IOPAMIDOL 90 ML: 755 INJECTION, SOLUTION INTRAVENOUS at 17:46

## 2024-10-14 RX ADMIN — HEPARIN SODIUM 12000 UNITS: 1000 INJECTION INTRAVENOUS; SUBCUTANEOUS at 22:07

## 2024-10-14 RX ADMIN — PHENYLEPHRINE HYDROCHLORIDE 100 MCG: 10 INJECTION INTRAVENOUS at 20:40

## 2024-10-14 RX ADMIN — FENTANYL CITRATE 50 MCG: 50 INJECTION INTRAMUSCULAR; INTRAVENOUS at 23:33

## 2024-10-14 RX ADMIN — PROPOFOL 140 MG: 10 INJECTION, EMULSION INTRAVENOUS at 20:37

## 2024-10-14 RX ADMIN — PHENYLEPHRINE HYDROCHLORIDE 100 MCG: 10 INJECTION INTRAVENOUS at 20:59

## 2024-10-14 RX ADMIN — PHENYLEPHRINE HYDROCHLORIDE 100 MCG: 10 INJECTION INTRAVENOUS at 21:22

## 2024-10-14 RX ADMIN — PROTAMINE SULFATE 30 MG: 10 INJECTION, SOLUTION INTRAVENOUS at 23:42

## 2024-10-14 RX ADMIN — Medication 200 MG: at 23:47

## 2024-10-14 RX ADMIN — PHENYLEPHRINE HYDROCHLORIDE 200 MCG: 10 INJECTION INTRAVENOUS at 21:02

## 2024-10-14 RX ADMIN — LIDOCAINE HYDROCHLORIDE 100 MG: 20 INJECTION, SOLUTION INFILTRATION; PERINEURAL at 20:37

## 2024-10-14 RX ADMIN — FENTANYL CITRATE 100 MCG: 50 INJECTION INTRAMUSCULAR; INTRAVENOUS at 20:37

## 2024-10-14 RX ADMIN — IOPAMIDOL 75 ML: 755 INJECTION, SOLUTION INTRAVENOUS at 14:12

## 2024-10-14 RX ADMIN — SODIUM CHLORIDE, SODIUM LACTATE, POTASSIUM CHLORIDE, CALCIUM CHLORIDE: 600; 310; 30; 20 INJECTION, SOLUTION INTRAVENOUS at 20:45

## 2024-10-14 RX ADMIN — PHENYLEPHRINE HYDROCHLORIDE 0.3 MCG/KG/MIN: 10 INJECTION INTRAVENOUS at 20:49

## 2024-10-14 ASSESSMENT — COLUMBIA-SUICIDE SEVERITY RATING SCALE - C-SSRS
1. IN THE PAST MONTH, HAVE YOU WISHED YOU WERE DEAD OR WISHED YOU COULD GO TO SLEEP AND NOT WAKE UP?: NO
6. HAVE YOU EVER DONE ANYTHING, STARTED TO DO ANYTHING, OR PREPARED TO DO ANYTHING TO END YOUR LIFE?: NO
2. HAVE YOU ACTUALLY HAD ANY THOUGHTS OF KILLING YOURSELF IN THE PAST MONTH?: NO

## 2024-10-14 ASSESSMENT — ACTIVITIES OF DAILY LIVING (ADL)
ADLS_ACUITY_SCORE: 37
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
## 2024-10-14 NOTE — ED NOTES
Expected Patient Referral to ED  12:13 PM    Referring Clinic/Provider:    danny Navarrete assistant [facility] walk-in clinic.      Reason for referral/Clinical facts:    URI symptoms along with other family members for a number of weeks.  Several days now with shortness of breath.  Red cough was noted today that he initially thought was fruit punch, but feels it is blood.  Mild tachycardia at 112.  Coming by private vehicle.        Recommendations provided:  Send to ED for further evaluation    Caller was informed that this institution does possess the capabilities and/or resources to provide for patient and should be transferred to our facility.    Discussed that if direct admit is sought and any hurdles are encountered, this ED would be happy to see the patient and evaluate.    Informed caller that recommendations provided are recommendations based only on the facts provided and that they responsible to accept or reject the advice, or to seek a formal in person consultation as needed and that this ED will see/treat patient should they arrive.      Jose R Zhou MD  [facility] EMERGENCY ROOM  [address] [id number] [phone]       Jose R Zhou MD  10/14/24 8169
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
## 2024-10-14 NOTE — ED TRIAGE NOTES
PT states he has had a cough for the pas 2-3 weeks. Today he stated coughing up blood clots. PT states he also has left anterior chest pain. PT was at the clinic they sent him here for  PE workup.  Rate his chest pain a 2/10.     Triage Assessment (Adult)       Row Name 10/14/24 1244          Triage Assessment    Airway WDL WDL        Respiratory WDL    Respiratory WDL X;rhythm/pattern;cough     Cough Frequency infrequent     Cough Type productive        Skin Circulation/Temperature WDL    Skin Circulation/Temperature WDL WDL        Cardiac WDL    Cardiac WDL X;rhythm     Pulse Rate & Regularity tachycardic        Peripheral/Neurovascular WDL    Peripheral Neurovascular WDL WDL        Cognitive/Neuro/Behavioral WDL    Cognitive/Neuro/Behavioral WDL WDL

## 2024-10-15 ENCOUNTER — APPOINTMENT (OUTPATIENT)
Dept: OCCUPATIONAL THERAPY | Facility: CLINIC | Age: 54
End: 2024-10-15
Attending: STUDENT IN AN ORGANIZED HEALTH CARE EDUCATION/TRAINING PROGRAM
Payer: COMMERCIAL

## 2024-10-15 ENCOUNTER — APPOINTMENT (OUTPATIENT)
Dept: GENERAL RADIOLOGY | Facility: CLINIC | Age: 54
End: 2024-10-15
Payer: COMMERCIAL

## 2024-10-15 ENCOUNTER — APPOINTMENT (OUTPATIENT)
Dept: GENERAL RADIOLOGY | Facility: CLINIC | Age: 54
End: 2024-10-15
Attending: STUDENT IN AN ORGANIZED HEALTH CARE EDUCATION/TRAINING PROGRAM
Payer: COMMERCIAL

## 2024-10-15 LAB
ACT BLD: 144 SECONDS (ref 74–150)
ALBUMIN SERPL BCG-MCNC: 3.2 G/DL (ref 3.5–5.2)
ALBUMIN SERPL BCG-MCNC: 3.4 G/DL (ref 3.5–5.2)
ALLEN'S TEST: ABNORMAL
ALLEN'S TEST: ABNORMAL
ALP SERPL-CCNC: 77 U/L (ref 40–150)
ALP SERPL-CCNC: 79 U/L (ref 40–150)
ALT SERPL W P-5'-P-CCNC: 12 U/L (ref 0–70)
ALT SERPL W P-5'-P-CCNC: 17 U/L (ref 0–70)
ANION GAP SERPL CALCULATED.3IONS-SCNC: 10 MMOL/L (ref 7–15)
ANION GAP SERPL CALCULATED.3IONS-SCNC: 11 MMOL/L (ref 7–15)
APTT PPP: 41 SECONDS (ref 22–38)
AST SERPL W P-5'-P-CCNC: 14 U/L (ref 0–45)
AST SERPL W P-5'-P-CCNC: 16 U/L (ref 0–45)
BASE EXCESS BLDA CALC-SCNC: 3.9 MMOL/L (ref -3–3)
BASE EXCESS BLDA CALC-SCNC: 4.4 MMOL/L (ref -3–3)
BILIRUB SERPL-MCNC: 0.7 MG/DL
BILIRUB SERPL-MCNC: 0.8 MG/DL
BUN SERPL-MCNC: 12.5 MG/DL (ref 6–20)
BUN SERPL-MCNC: 12.7 MG/DL (ref 6–20)
CALCIUM SERPL-MCNC: 8.4 MG/DL (ref 8.8–10.4)
CALCIUM SERPL-MCNC: 8.5 MG/DL (ref 8.8–10.4)
CHLORIDE SERPL-SCNC: 105 MMOL/L (ref 98–107)
CHLORIDE SERPL-SCNC: 106 MMOL/L (ref 98–107)
COHGB MFR BLD: 94.3 % (ref 96–97)
COHGB MFR BLD: 96.7 % (ref 96–97)
CREAT SERPL-MCNC: 0.98 MG/DL (ref 0.67–1.17)
CREAT SERPL-MCNC: 1.06 MG/DL (ref 0.67–1.17)
EGFRCR SERPLBLD CKD-EPI 2021: 83 ML/MIN/1.73M2
EGFRCR SERPLBLD CKD-EPI 2021: >90 ML/MIN/1.73M2
ERYTHROCYTE [DISTWIDTH] IN BLOOD BY AUTOMATED COUNT: 13.7 % (ref 10–15)
ERYTHROCYTE [DISTWIDTH] IN BLOOD BY AUTOMATED COUNT: 13.8 % (ref 10–15)
FIBRINOGEN PPP-MCNC: 409 MG/DL (ref 170–510)
GLUCOSE BLDC GLUCOMTR-MCNC: 100 MG/DL (ref 70–99)
GLUCOSE BLDC GLUCOMTR-MCNC: 106 MG/DL (ref 70–99)
GLUCOSE BLDC GLUCOMTR-MCNC: 125 MG/DL (ref 70–99)
GLUCOSE BLDC GLUCOMTR-MCNC: 138 MG/DL (ref 70–99)
GLUCOSE BLDC GLUCOMTR-MCNC: 140 MG/DL (ref 70–99)
GLUCOSE BLDC GLUCOMTR-MCNC: 145 MG/DL (ref 70–99)
GLUCOSE SERPL-MCNC: 135 MG/DL (ref 70–99)
GLUCOSE SERPL-MCNC: 150 MG/DL (ref 70–99)
HCO3 BLD-SCNC: 29 MMOL/L (ref 21–28)
HCO3 BLD-SCNC: 30 MMOL/L (ref 21–28)
HCO3 SERPL-SCNC: 24 MMOL/L (ref 22–29)
HCO3 SERPL-SCNC: 26 MMOL/L (ref 22–29)
HCT VFR BLD AUTO: 34.3 % (ref 40–53)
HCT VFR BLD AUTO: 35.3 % (ref 40–53)
HGB BLD-MCNC: 11.1 G/DL (ref 13.3–17.7)
HGB BLD-MCNC: 11.5 G/DL (ref 13.3–17.7)
INR PPP: 1.25 (ref 0.85–1.15)
LACTATE SERPL-SCNC: 1.3 MMOL/L (ref 0.7–2)
LACTATE SERPL-SCNC: 1.7 MMOL/L (ref 0.7–2)
MAGNESIUM SERPL-MCNC: 1.9 MG/DL (ref 1.7–2.3)
MCH RBC QN AUTO: 28 PG (ref 26.5–33)
MCH RBC QN AUTO: 28.3 PG (ref 26.5–33)
MCHC RBC AUTO-ENTMCNC: 32.4 G/DL (ref 31.5–36.5)
MCHC RBC AUTO-ENTMCNC: 32.6 G/DL (ref 31.5–36.5)
MCV RBC AUTO: 87 FL (ref 78–100)
MCV RBC AUTO: 87 FL (ref 78–100)
O2/TOTAL GAS SETTING VFR VENT: 1 %
O2/TOTAL GAS SETTING VFR VENT: 5 %
PCO2 BLD: 43 MM HG (ref 35–45)
PCO2 BLD: 48 MM HG (ref 35–45)
PH BLD: 7.41 [PH] (ref 7.35–7.45)
PH BLD: 7.43 [PH] (ref 7.35–7.45)
PHOSPHATE SERPL-MCNC: 2.7 MG/DL (ref 2.5–4.5)
PLATELET # BLD AUTO: 156 10E3/UL (ref 150–450)
PLATELET # BLD AUTO: 167 10E3/UL (ref 150–450)
PO2 BLD: 69 MM HG (ref 80–105)
PO2 BLD: 86 MM HG (ref 80–105)
POTASSIUM SERPL-SCNC: 3.7 MMOL/L (ref 3.4–5.3)
POTASSIUM SERPL-SCNC: 4.1 MMOL/L (ref 3.4–5.3)
PROT SERPL-MCNC: 6.6 G/DL (ref 6.4–8.3)
PROT SERPL-MCNC: 6.7 G/DL (ref 6.4–8.3)
RBC # BLD AUTO: 3.96 10E6/UL (ref 4.4–5.9)
RBC # BLD AUTO: 4.07 10E6/UL (ref 4.4–5.9)
SAO2 % BLDA: 92 % (ref 92–100)
SAO2 % BLDA: 94 % (ref 92–100)
SODIUM SERPL-SCNC: 141 MMOL/L (ref 135–145)
SODIUM SERPL-SCNC: 141 MMOL/L (ref 135–145)
TROPONIN T SERPL HS-MCNC: 10 NG/L
TROPONIN T SERPL HS-MCNC: 9 NG/L
WBC # BLD AUTO: 7.1 10E3/UL (ref 4–11)
WBC # BLD AUTO: 8.9 10E3/UL (ref 4–11)

## 2024-10-15 PROCEDURE — 85610 PROTHROMBIN TIME: CPT | Performed by: STUDENT IN AN ORGANIZED HEALTH CARE EDUCATION/TRAINING PROGRAM

## 2024-10-15 PROCEDURE — 93005 ELECTROCARDIOGRAM TRACING: CPT

## 2024-10-15 PROCEDURE — 80053 COMPREHEN METABOLIC PANEL: CPT | Performed by: STUDENT IN AN ORGANIZED HEALTH CARE EDUCATION/TRAINING PROGRAM

## 2024-10-15 PROCEDURE — 85027 COMPLETE CBC AUTOMATED: CPT | Performed by: STUDENT IN AN ORGANIZED HEALTH CARE EDUCATION/TRAINING PROGRAM

## 2024-10-15 PROCEDURE — 250N000011 HC RX IP 250 OP 636: Performed by: STUDENT IN AN ORGANIZED HEALTH CARE EDUCATION/TRAINING PROGRAM

## 2024-10-15 PROCEDURE — 250N000013 HC RX MED GY IP 250 OP 250 PS 637: Performed by: STUDENT IN AN ORGANIZED HEALTH CARE EDUCATION/TRAINING PROGRAM

## 2024-10-15 PROCEDURE — 93010 ELECTROCARDIOGRAM REPORT: CPT | Performed by: INTERNAL MEDICINE

## 2024-10-15 PROCEDURE — 999N000065 XR CHEST PORT 1 VIEW

## 2024-10-15 PROCEDURE — 84155 ASSAY OF PROTEIN SERUM: CPT

## 2024-10-15 PROCEDURE — 250N000011 HC RX IP 250 OP 636

## 2024-10-15 PROCEDURE — 258N000003 HC RX IP 258 OP 636: Performed by: STUDENT IN AN ORGANIZED HEALTH CARE EDUCATION/TRAINING PROGRAM

## 2024-10-15 PROCEDURE — 85730 THROMBOPLASTIN TIME PARTIAL: CPT | Performed by: STUDENT IN AN ORGANIZED HEALTH CARE EDUCATION/TRAINING PROGRAM

## 2024-10-15 PROCEDURE — 71045 X-RAY EXAM CHEST 1 VIEW: CPT | Mod: 26 | Performed by: RADIOLOGY

## 2024-10-15 PROCEDURE — 83735 ASSAY OF MAGNESIUM: CPT

## 2024-10-15 PROCEDURE — 250N000013 HC RX MED GY IP 250 OP 250 PS 637

## 2024-10-15 PROCEDURE — 85027 COMPLETE CBC AUTOMATED: CPT | Performed by: SURGERY

## 2024-10-15 PROCEDURE — 82247 BILIRUBIN TOTAL: CPT

## 2024-10-15 PROCEDURE — 999N000157 HC STATISTIC RCP TIME EA 10 MIN

## 2024-10-15 PROCEDURE — 272N000280 HC DEVICE COMPRESSION CR5

## 2024-10-15 PROCEDURE — 71045 X-RAY EXAM CHEST 1 VIEW: CPT

## 2024-10-15 PROCEDURE — 99233 SBSQ HOSP IP/OBS HIGH 50: CPT | Mod: 24

## 2024-10-15 PROCEDURE — 97530 THERAPEUTIC ACTIVITIES: CPT | Mod: GO

## 2024-10-15 PROCEDURE — 200N000002 HC R&B ICU UMMC

## 2024-10-15 PROCEDURE — 250N000011 HC RX IP 250 OP 636: Mod: JZ | Performed by: STUDENT IN AN ORGANIZED HEALTH CARE EDUCATION/TRAINING PROGRAM

## 2024-10-15 PROCEDURE — 84100 ASSAY OF PHOSPHORUS: CPT

## 2024-10-15 PROCEDURE — 999N000147 HC STATISTIC PT IP EVAL DEFER

## 2024-10-15 PROCEDURE — 85384 FIBRINOGEN ACTIVITY: CPT | Performed by: STUDENT IN AN ORGANIZED HEALTH CARE EDUCATION/TRAINING PROGRAM

## 2024-10-15 PROCEDURE — 84484 ASSAY OF TROPONIN QUANT: CPT

## 2024-10-15 PROCEDURE — 97165 OT EVAL LOW COMPLEX 30 MIN: CPT | Mod: GO

## 2024-10-15 PROCEDURE — 82805 BLOOD GASES W/O2 SATURATION: CPT | Performed by: STUDENT IN AN ORGANIZED HEALTH CARE EDUCATION/TRAINING PROGRAM

## 2024-10-15 PROCEDURE — 83605 ASSAY OF LACTIC ACID: CPT | Performed by: STUDENT IN AN ORGANIZED HEALTH CARE EDUCATION/TRAINING PROGRAM

## 2024-10-15 RX ORDER — ALLOPURINOL 100 MG/1
100 TABLET ORAL DAILY
Status: DISCONTINUED | OUTPATIENT
Start: 2024-10-15 | End: 2024-10-18 | Stop reason: HOSPADM

## 2024-10-15 RX ORDER — PHENYLEPHRINE HCL IN 0.9% NACL 50MG/250ML
.5-2 PLASTIC BAG, INJECTION (ML) INTRAVENOUS CONTINUOUS
Status: DISCONTINUED | OUTPATIENT
Start: 2024-10-15 | End: 2024-10-15

## 2024-10-15 RX ORDER — HYDROXYZINE HYDROCHLORIDE 25 MG/1
25 TABLET, FILM COATED ORAL ONCE
Status: COMPLETED | OUTPATIENT
Start: 2024-10-15 | End: 2024-10-15

## 2024-10-15 RX ORDER — MAGNESIUM OXIDE 400 MG/1
400 TABLET ORAL EVERY 4 HOURS
Status: COMPLETED | OUTPATIENT
Start: 2024-10-15 | End: 2024-10-15

## 2024-10-15 RX ORDER — METOPROLOL TARTRATE 50 MG
100 TABLET ORAL 2 TIMES DAILY
Status: DISCONTINUED | OUTPATIENT
Start: 2024-10-15 | End: 2024-10-18 | Stop reason: HOSPADM

## 2024-10-15 RX ORDER — LABETALOL HYDROCHLORIDE 5 MG/ML
INJECTION, SOLUTION INTRAVENOUS
Status: DISCONTINUED
Start: 2024-10-15 | End: 2024-10-16 | Stop reason: HOSPADM

## 2024-10-15 RX ORDER — SODIUM CHLORIDE, SODIUM LACTATE, POTASSIUM CHLORIDE, CALCIUM CHLORIDE 600; 310; 30; 20 MG/100ML; MG/100ML; MG/100ML; MG/100ML
INJECTION, SOLUTION INTRAVENOUS CONTINUOUS
Status: DISCONTINUED | OUTPATIENT
Start: 2024-10-15 | End: 2024-10-15

## 2024-10-15 RX ORDER — CEFAZOLIN SODIUM 2 G/100ML
2 INJECTION, SOLUTION INTRAVENOUS EVERY 8 HOURS
Status: COMPLETED | OUTPATIENT
Start: 2024-10-15 | End: 2024-10-15

## 2024-10-15 RX ORDER — LABETALOL HYDROCHLORIDE 5 MG/ML
10 INJECTION, SOLUTION INTRAVENOUS
Status: COMPLETED | OUTPATIENT
Start: 2024-10-15 | End: 2024-10-15

## 2024-10-15 RX ORDER — HYDRALAZINE HYDROCHLORIDE 20 MG/ML
10 INJECTION INTRAMUSCULAR; INTRAVENOUS
Status: COMPLETED | OUTPATIENT
Start: 2024-10-15 | End: 2024-10-15

## 2024-10-15 RX ORDER — TAMSULOSIN HYDROCHLORIDE 0.4 MG/1
0.4 CAPSULE ORAL DAILY
Status: DISCONTINUED | OUTPATIENT
Start: 2024-10-16 | End: 2024-10-16

## 2024-10-15 RX ORDER — ASPIRIN 81 MG/1
81 TABLET, CHEWABLE ORAL DAILY
Status: DISCONTINUED | OUTPATIENT
Start: 2024-10-15 | End: 2024-10-18 | Stop reason: HOSPADM

## 2024-10-15 RX ORDER — HYDRALAZINE HYDROCHLORIDE 20 MG/ML
10 INJECTION INTRAMUSCULAR; INTRAVENOUS EVERY 4 HOURS PRN
Status: DISCONTINUED | OUTPATIENT
Start: 2024-10-15 | End: 2024-10-15

## 2024-10-15 RX ORDER — ACETAMINOPHEN 325 MG/1
975 TABLET ORAL EVERY 6 HOURS
Status: DISCONTINUED | OUTPATIENT
Start: 2024-10-15 | End: 2024-10-18 | Stop reason: HOSPADM

## 2024-10-15 RX ORDER — LIDOCAINE 40 MG/G
CREAM TOPICAL
Status: DISCONTINUED | OUTPATIENT
Start: 2024-10-15 | End: 2024-10-18 | Stop reason: HOSPADM

## 2024-10-15 RX ORDER — TAMSULOSIN HYDROCHLORIDE 0.4 MG/1
0.4 CAPSULE ORAL DAILY
Status: DISCONTINUED | OUTPATIENT
Start: 2024-10-15 | End: 2024-10-15

## 2024-10-15 RX ORDER — LABETALOL HYDROCHLORIDE 5 MG/ML
10 INJECTION, SOLUTION INTRAVENOUS EVERY 4 HOURS PRN
Status: DISCONTINUED | OUTPATIENT
Start: 2024-10-15 | End: 2024-10-18 | Stop reason: HOSPADM

## 2024-10-15 RX ORDER — LABETALOL HYDROCHLORIDE 5 MG/ML
10 INJECTION, SOLUTION INTRAVENOUS EVERY 4 HOURS PRN
Status: COMPLETED | OUTPATIENT
Start: 2024-10-15 | End: 2024-10-15

## 2024-10-15 RX ORDER — ATORVASTATIN CALCIUM 40 MG/1
40 TABLET, FILM COATED ORAL AT BEDTIME
Status: DISCONTINUED | OUTPATIENT
Start: 2024-10-15 | End: 2024-10-18 | Stop reason: HOSPADM

## 2024-10-15 RX ORDER — HEPARIN SODIUM 5000 [USP'U]/.5ML
5000 INJECTION, SOLUTION INTRAVENOUS; SUBCUTANEOUS EVERY 8 HOURS
Status: DISCONTINUED | OUTPATIENT
Start: 2024-10-16 | End: 2024-10-18 | Stop reason: HOSPADM

## 2024-10-15 RX ORDER — HYDRALAZINE HYDROCHLORIDE 20 MG/ML
10 INJECTION INTRAMUSCULAR; INTRAVENOUS EVERY 4 HOURS PRN
Status: DISCONTINUED | OUTPATIENT
Start: 2024-10-15 | End: 2024-10-18 | Stop reason: HOSPADM

## 2024-10-15 RX ORDER — NICARDIPINE HCL-0.9% SOD CHLOR 1 MG/10 ML
SYRINGE (ML) INTRAVENOUS PRN
Status: DISCONTINUED | OUTPATIENT
Start: 2024-10-15 | End: 2024-10-15

## 2024-10-15 RX ORDER — PHENYLEPHRINE HCL IN 0.9% NACL 50MG/250ML
.5-2 PLASTIC BAG, INJECTION (ML) INTRAVENOUS CONTINUOUS PRN
Status: DISCONTINUED | OUTPATIENT
Start: 2024-10-15 | End: 2024-10-15

## 2024-10-15 RX ADMIN — HYDRALAZINE HYDROCHLORIDE 10 MG: 20 INJECTION INTRAMUSCULAR; INTRAVENOUS at 10:09

## 2024-10-15 RX ADMIN — CEFAZOLIN SODIUM 2 G: 2 INJECTION, SOLUTION INTRAVENOUS at 13:02

## 2024-10-15 RX ADMIN — HYDRALAZINE HYDROCHLORIDE 10 MG: 20 INJECTION INTRAMUSCULAR; INTRAVENOUS at 16:45

## 2024-10-15 RX ADMIN — ACETAMINOPHEN 975 MG: 325 TABLET, FILM COATED ORAL at 19:56

## 2024-10-15 RX ADMIN — METOPROLOL TARTRATE 100 MG: 50 TABLET, FILM COATED ORAL at 19:56

## 2024-10-15 RX ADMIN — LABETALOL HYDROCHLORIDE 10 MG: 5 INJECTION, SOLUTION INTRAVENOUS at 14:35

## 2024-10-15 RX ADMIN — ACETAMINOPHEN 975 MG: 325 TABLET, FILM COATED ORAL at 07:38

## 2024-10-15 RX ADMIN — SODIUM CHLORIDE, POTASSIUM CHLORIDE, SODIUM LACTATE AND CALCIUM CHLORIDE: 600; 310; 30; 20 INJECTION, SOLUTION INTRAVENOUS at 00:53

## 2024-10-15 RX ADMIN — Medication 200 MCG: at 00:17

## 2024-10-15 RX ADMIN — ACETAMINOPHEN 975 MG: 325 TABLET, FILM COATED ORAL at 01:28

## 2024-10-15 RX ADMIN — ATORVASTATIN CALCIUM 40 MG: 40 TABLET, FILM COATED ORAL at 23:07

## 2024-10-15 RX ADMIN — Medication 100 MCG: at 00:13

## 2024-10-15 RX ADMIN — Medication 1 PACKET: at 06:13

## 2024-10-15 RX ADMIN — HYDROMORPHONE HYDROCHLORIDE 0.2 MG: 0.2 INJECTION, SOLUTION INTRAMUSCULAR; INTRAVENOUS; SUBCUTANEOUS at 03:38

## 2024-10-15 RX ADMIN — Medication 100 MCG: at 00:04

## 2024-10-15 RX ADMIN — Medication 1 PACKET: at 10:11

## 2024-10-15 RX ADMIN — ALLOPURINOL 100 MG: 100 TABLET ORAL at 16:21

## 2024-10-15 RX ADMIN — MAGNESIUM OXIDE TAB 400 MG (241.3 MG ELEMENTAL MG) 400 MG: 400 (241.3 MG) TAB at 10:11

## 2024-10-15 RX ADMIN — HYDROMORPHONE HYDROCHLORIDE 0.2 MG: 0.2 INJECTION, SOLUTION INTRAMUSCULAR; INTRAVENOUS; SUBCUTANEOUS at 06:35

## 2024-10-15 RX ADMIN — ACETAMINOPHEN 975 MG: 325 TABLET, FILM COATED ORAL at 13:02

## 2024-10-15 RX ADMIN — ASPIRIN 81 MG CHEWABLE TABLET 81 MG: 81 TABLET CHEWABLE at 16:21

## 2024-10-15 RX ADMIN — MAGNESIUM OXIDE TAB 400 MG (241.3 MG ELEMENTAL MG) 400 MG: 400 (241.3 MG) TAB at 06:13

## 2024-10-15 RX ADMIN — LABETALOL HYDROCHLORIDE 10 MG: 5 INJECTION, SOLUTION INTRAVENOUS at 03:01

## 2024-10-15 RX ADMIN — HYDROXYZINE HYDROCHLORIDE 25 MG: 25 TABLET ORAL at 03:01

## 2024-10-15 RX ADMIN — METHOCARBAMOL 500 MG: 500 TABLET ORAL at 07:38

## 2024-10-15 RX ADMIN — CEFAZOLIN SODIUM 2 G: 2 INJECTION, SOLUTION INTRAVENOUS at 05:08

## 2024-10-15 ASSESSMENT — ACTIVITIES OF DAILY LIVING (ADL)
ADLS_ACUITY_SCORE: 43
ADLS_ACUITY_SCORE: 43
ADLS_ACUITY_SCORE: 45
ADLS_ACUITY_SCORE: 43
ADLS_ACUITY_SCORE: 37
ADLS_ACUITY_SCORE: 43
ADLS_ACUITY_SCORE: 45
ADLS_ACUITY_SCORE: 43
ADLS_ACUITY_SCORE: 45
ADLS_ACUITY_SCORE: 43
ADLS_ACUITY_SCORE: 37
ADLS_ACUITY_SCORE: 43

## 2024-10-15 NOTE — PLAN OF CARE
4A Physical Therapy - Per chart review and discussion with occupational therapy, Pt ambulating with SBA within room due to precautions limitations, no noted balance deficits or concerns for proximal strength. Plan for OT to continue to follow to address functional mobility as needed. Pt with no skilled inpatient PT needs, Will complete consult and defer evaluation, please reconsult as appropriate if patient has decline in functional mobility requiring further skilled inpatient PT needs. Defer Discharge recommendations to occupational therapy.

## 2024-10-15 NOTE — PROGRESS NOTES
"   10/15/24 1440   Appointment Info   Signing Clinician's Name / Credentials (OT) Cate Kay OTRL   Rehab Comments (OT) SBP < 160, MAP > 80, OT only   Living Environment   People in Home spouse;child(susie), dependent   Current Living Arrangements house   Home Accessibility stairs to enter home;stairs within home   Number of Stairs, Main Entrance 4   Number of Stairs, Within Home, Primary greater than 10 stairs  (16)   Stair Railings, Within Home, Primary railings safe and in good condition   Transportation Anticipated car, drives self;family or friend will provide   Living Environment Comments pt lives in a house with his wife and 2 teenage children. Pt reports 3-4 stairs to enter home, and about 16 to access upstairs office.   Self-Care   Usual Activity Tolerance good   Current Activity Tolerance moderate   Regular Exercise No   Equipment Currently Used at Home none   Fall history within last six months no   Activity/Exercise/Self-Care Comment Pt reports IND at baseline with ADLs, IADLs and mobility. Pt enjoys spending time with his kids, he recently built an \"arcade\" in the basement.   General Information   Onset of Illness/Injury or Date of Surgery 10/15/24   Referring Physician Armando Freitas MD   Patient/Family Therapy Goal Statement (OT) to go home   Additional Occupational Profile Info/Pertinent History of Current Problem per chart Yadiel Roth is a 54 year old male with PMH of thoracic ascending aortic aneurysm s/p repair 6/2019 with Dr. Mosquera c/b postop left vocal cord paralysis, migraines, HTN, dyslipidemia, gout, and DM2. [facility]ed to [facility] with c/o hemoptysis and URI for several days. Found to be COVID +. CT scan obtained for PE eval, found significant dilation of the mid and distal aortic arch measuring 9.1cm. Transferred to [facility] for further evaluation. Underwent TEVAR with Dr. Bonner and Dr. Medina 10/14.   Existing Precautions/Restrictions cardiac   Cognitive Status Examination "   Orientation Status orientation to person, place and time   Cognitive Status Comments Appears intact   Visual Perception   Visual Impairment/Limitations corrective lenses for reading   Sensory   Sensory Quick Adds sensation intact   Posture   Posture not impaired   Range of Motion Comprehensive   General Range of Motion bilateral upper extremity ROM WNL   Strength Comprehensive (MMT)   General Manual Muscle Testing (MMT) Assessment no strength deficits identified   Coordination   Upper Extremity Coordination No deficits were identified   Bed Mobility   Bed Mobility supine-sit   Supine-Sit [place] (Bed Mobility) minimum assist (75% patient effort)   Transfers   Transfers sit-stand transfer;toilet transfer;shower transfer   Sit-Stand Transfer   Sit-Stand [place] (Transfers) supervision;set up   Shower Transfer   Type (Shower Transfer) lateral   [place] Level (Shower Transfer) set up;supervision   Shower Transfer Comments per clinical judgement   Toilet Transfer   Type (Toilet Transfer) sit-stand   [place] Level (Toilet Transfer) independent   Toilet Transfer Comments per clinical judgement   Balance   Balance Assessment no deficits identified   Activities of Daily Living   BADL Assessment/Intervention bathing;lower body dressing;toileting   Bathing Assessment/Intervention   [place] Level (Bathing) set up   Comment, (Bathing) per clinical judgement   Lower Body Dressing Assessment/Training   Comment, (Lower Body Dressing) per clinical judgement   [place] Level (Lower Body Dressing) independent   Toileting   Comment, (Toileting) per clinical judgement   [place] Level (Toileting) independent   Clinical Impression   Criteria for Skilled Therapeutic Interventions Met (OT) Yes, treatment indicated   OT Diagnosis pt is below baseline for ADLs   OT Problem List-Impairments impacting ADL problems related to;activity tolerance impaired;pain   Assessment of Occupational Performance 3-5  Performance Deficits   Identified Performance Deficits home management, work, leisure   Planned Therapy Interventions (OT) IADL retraining;ADL retraining;progressive activity/exercise;home program guidelines;risk factor education   Clinical Decision Making Complexity (OT) problem focused assessment/low complexity   Risk & Benefits of therapy have been explained evaluation/treatment results reviewed;care plan/treatment goals reviewed;risks/benefits reviewed;current/potential barriers reviewed;participants voiced agreement with care plan;participants included;patient   Clinical Impression Comments pt presents below baseline, limited by activity tolerance, and pain. pt will benefit from skilled OT to progress toward PLOF   OT Total Evaluation Time   OT Eval, Low Complexity Minutes (55468) 6   OT Goals   Therapy Frequency (OT) 3 times/week   OT Predicted Duration/Target Date for Goal Attainment 10/31/24   OT Goals Aerobic Activity;OT Goal 1   OT: Perform aerobic activity with stable cardiovascular response intermittent activity;10 minutes;ambulation   OT: Goal 1 pt will ascend/descend x 10 stairs by discharge with SBA and stable vital signs   Interventions   Interventions Quick Adds Therapeutic Activity   Therapeutic Activities   Therapeutic Activity Minutes (47347) 25   Symptoms noted during/after treatment significant change in vital signs;increased pain   Treatment Detail/Skilled Intervention Facilitated ambulation to progress activity tolerance for ADLs. Time spent setting up room/lines to increase pt IND/safety. Pt supine > sit EOB with min A. Pt STS x 3 during session, SBA throughout for lines. Pt ambulates 25ft, 50ft, in room with SBA for lines only. Pt educated on EC strategies for home, with pt expressing understanding. Pt SBPs stable through activity (-165), however once pt sits to rest, BP consistently in 160s-170s. Following seated rest, pt reports mild pressure/pain in chest. RN alerted and  assessing. RN requests pt return to bed. Pt STS and pivots to bed with SBA. Pt sit > supine with SBA. Pt SBP cont to be in 160s-170s. Team arriving to assess.   OT Discharge Planning   OT Plan progress ambulation, stairs as needed (may be okay w/o practicing?)   OT Discharge Recommendation (DC Rec) home with assist   OT Rationale for DC Rec Anticipate pt will progress to be safe to d/c home with family A for heavy tasks   OT Brief overview of current status SBA   Total Session Time   Timed Code Treatment Minutes 25   Total Session Time (sum of timed and untimed services) 31

## 2024-10-15 NOTE — PLAN OF CARE
Goal Outcome Evaluation:      Plan of Care Reviewed With: patient    Overall Patient Progress: improvingOverall Patient Progress: improving    Outcome Evaluation: Pt arrived to unit on 150 mcg/kg/min of esmolol. CTA completed. Pending emergent surgery.    Admitted/transferred from: [facility]  Reason for admission/transfer: dilation of the mid and distal aortic arch pending repair.   2 RN skin assessment: completed by ,RN and MD,RN  Result of skin assessment and interventions/actions: Sacral mepilex placed.  Height, weight, drug calc weight: Done  Patient belongings (see Flowsheet)  MDRO education added to care planN/A  ?

## 2024-10-15 NOTE — PROGRESS NOTES
"Post-op Check    POD#0 s/p emergent TEVAR for contained ruptured descending thoracic aortic aneurysm.    Seen at bedside on arrival to ICU. Denies pain, numbness, or weakness in upper or lower extremities.    BP (!) 89/73   Pulse 84   Temp 100.1  F (37.8  C) (Oral)   Resp 17   Ht 1.753 m (5' 9\")   Wt 106.5 kg (234 lb 12.6 oz)   SpO2 100%   BMI 34.67 kg/m      On exam resting comfortably in bed  Nonlabored breathing on room nasal cannula  Left wrist TR band in place, radial access site hemostatic, hand warm, normal cap refill, normal strength and sensation  Right groin access site soft, c/d/I  Palpable bilateral DP pulses, brisk left ulnar and left palmar arch doppler signals  Normal and symmetric strength with hip flexion, plantar flexion, and dorsiflexion    Labs pending    Plan:  Multimodal pain control  Goal MAP > 80 mmHg, phenylephrine available if needed  NPO with PO meds, LR @ 75  Bed rest tonight, HOB < 30 deg  Q1H pulse, access site, and neuro checks, must test bilateral hip flexion without pillows below the thighs or calves  L wrist TR band, remove 3 ml air q15min, next due @ 12:45  Will need bronchoscopy with sputum cx in AM  Prophylactic abx  Prophylactic SQH tomorrow  Serial labs q6h, correct coagulopathy  Spinal drain kit at bedside    Family and cardiac ICU updated on plan    Please page vascular surgery with any questions tonight.    Armando Freitas MD    "

## 2024-10-15 NOTE — PLAN OF CARE
Major Shift Events:   Neuro: A&O, Q1 hour neuros until 0000    CV:   Rate/rhythm:  70-80s  NSR occasional ectopy  BP goals: MAP>80 Systolic<160. Paging for PRNs       Pulm: RA. Very diminished on L side. coughing blood which was cause of ED visit      GI: NPO, No BM    : Jenkins removed    Skin: Right groin site and left wrist site.     Drips: 2 PIV SL  Plan: Evaluate L lung, continue to manage BP  For vital signs and complete assessments, please see documentation flowsheets.

## 2024-10-15 NOTE — PROGRESS NOTES
[facility]    ICU Progress Note       Date of Admission:  10/14/2024    Assessment: Critical Care   Yadiel Roth is a 54 year old male with PMH of thoracic ascending aortic aneurysm s/p repair 6/2019 with Dr. Mosquera c/b postop left vocal cord paralysis, migraines, HTN, dyslipidemia, gout, and DM2. Presented to [facility] with c/o hemoptysis and URI for several days. Found to be COVID +. CT scan obtained for PE eval, found significant dilation of the mid and distal aortic arch measuring 9.1cm. Transferred to [facility] for further evaluation. Underwent TEVAR with Dr. Bonner and Dr. Medina 10/14. Extubated in PACU. Remains in ICU for Q1H neuro checks and MAP goal >80 for spinal cord perfusion precautions.        Plan: Critical Care   Neuro/ pain/ sedation:  - Monitor neurological status. Notify the MD for any acute changes in exam.  # Acute pain  - Scheduled: tylenol  - PRN: acetaminophen, dilaudid, robaxin     Pulmonary care:   # COVID+ 10/14/2024  # Hemoptysis  - Goal SpO2 > 92%  - Oxygenating well on room air. Was extubated  - Encourage IS q15-30 minutes when awake.  - CTA negative for PE on 10/14. Has had runny nose, congestion, and cough for several weeks. Family members also have similar symptoms. Denies any fevers, aches, or shortness of breath at time of admission.   - 10/15: CXR concerning for near-complete opacification of left chest. Patient is oxygenating well on room, denies dyspnea. Consider bronchoscopy per primary service.      Cardiovascular:    # Aortic arch dilation  # Hx Ascending and aortic arch aneurysm repair with elephant trunk with branched graft to cerebral vessels  # Hx HTN  # Hx dyslipidemia  - Monitor hemodynamic status.   - Goal MAP >80, SBP <160   - consider phenyl if not meeting goals per vascular surgery  - Hold PTA amlodipine, atorvastatin, asa, metop tartrate. Unclear of when last filled. Patient unclear at time of admission.   - PRN  labetalol x1 overnight to meet needs.      GI care / Nutrition:   # At risk for protein malnutrition  - NPO with PO meds.   - Nutrition consulted, appreciate recommendations  - PPI not indicated  - Bowel regimen: MiraLAX, senna     Renal / Fluids / Electrolytes:   # Hypocalcemia  Presenting creat 1.06  - Strict I/O, daily weights  - Avoid/limit nephrotoxins as able. Will have two loads of contrast dye with CT scans on 10/14.   - LR @ 75 for hydration while NPO, plan to stop fluids when current bag is complete.   - Replete lytes PRN per protocol     Endocrine:    # Diabetes Mellitus, type 2  A1c 6.4 on 10/15  - Insulin subcutaneous carb coverage ordered.   - Goal BG <180 for optimal healing     ID / Antibiotics:  # Covid + 10/14/2024  - Presented with URI for several weeks. WBC normal on presentation. Does not meet criteria for treatment of covid.   - Continue to monitor fever curve, WBC, and inflammatory markers as appropriate  - To complete perioperative prophylaxis.      Heme:     # No acute concerns  - Continue to monitor  - SQH to start POD1 per vascular surgery.     MSK / Skin:  # Procedural sites  - Ambulatory on admission  - PT/OT if indicated  - Right groin access for TEVAR soft, C/D/I  - Left wrist site hemostatic, with warm distal perfusion and intact CMS.     Lines/ tubes/ drains:  Jenkins Catheter: PRESENT, indication: ICU only: hourly urine output needed for patient care  Lines: PRESENT      Arterial Line 10/14/24 Radial-Site Assessment: WDL        Prophylaxis:  - DVT Prophylaxis: Heparin SQ  - PUD Prophylaxis: n/a    Code Status: Full Code      Disposition:  - ICU, for Q1H neurovascular checks and hemodynamic monitoring. Will transfer to SICU service for ongoing cares.     The patient's care was discussed with the Attending Physician, Dr. Clark .  Critical care time exclusive of procedures: 37 minutes    [facility]  Securely  "message with [employer] (more info)  Text page via [facility] Paging/Directory     Clinically Significant Risk Factors Present on Admission           # Hypocalcemia: Lowest Ca = 8.4 mg/dL in last 2 days, will monitor and replace as appropriate     # Hypoalbuminemia: Lowest albumin = 3.2 g/dL at 10/15/2024 12:36 AM, will monitor as appropriate  # Coagulation Defect: INR = 1.25 (Ref range: 0.85 - 1.15) and/or PTT = 41 Seconds (Ref range: 22 - 38 Seconds), will monitor for bleeding  # Drug Induced Platelet Defect: home medication list includes an antiplatelet medication   # Hypertension: Noted on problem list         # Obesity: Estimated body mass index is 34.9 kg/m  as calculated from the following:    Height as of this encounter: 1.753 m (5' 9\").    Weight as of this encounter: 107.2 kg (236 lb 5.3 oz).                  ______________________________________________________________________    Interval History   Overall course reviewed. Patient examined at bedside. Denies any shortness of breath, difficulty speaking, nausea/vomiting, or acute concerns. Happy to be at the other side of his surgery and curious about next steps.     Physical Exam   Vital Signs: Temp: 98  F (36.7  C) Temp src: Oral BP: (!) 89/73 Pulse: 73   Resp: 16 SpO2: 96 % O2 Device: Oxymask Oxygen Delivery: 1 LPM  Weight: 236 lbs 5.33 oz  Physical Exam  Vitals and nursing note reviewed.   Constitutional:       Appearance: Normal appearance.   HENT:      Head: Normocephalic and atraumatic.      Nose: Nose normal.      Mouth/Throat:      Mouth: Mucous membranes are moist.      Pharynx: No oropharyngeal exudate.   Eyes:      Pupils: Pupils are equal, round, and reactive to light.   Cardiovascular:      Rate and Rhythm: Normal rate and regular rhythm.      Pulses: Normal pulses.      Heart sounds: Normal heart sounds.   Pulmonary:      Effort: Pulmonary effort is normal.      Breath sounds: Decreased breath sounds (on left chest) present.   Abdominal:      " General: Abdomen is flat. Bowel sounds are normal.      Palpations: Abdomen is soft.   Musculoskeletal:         General: Normal range of motion.      Cervical back: Normal range of motion.   Skin:     General: Skin is warm and dry.      Capillary Refill: Capillary refill takes 2 to 3 seconds.   Neurological:      General: No focal deficit present.      Mental Status: He is alert and oriented to person, place, and time.      Motor: Motor function is intact.           Data   I reviewed all medications, new labs and imaging results over the last 24 hours.  Arterial Blood Gases   Recent Labs   Lab 10/15/24  0506 10/15/24  0036 10/14/24  2250 10/14/24  2156   PH 7.41 7.43 7.48* 7.43   PCO2 48* 43 37 43   PO2 86 69* 74* 78*   HCO3 30* 29* 28 28       Complete Blood Count   Recent Labs   Lab 10/15/24  0353 10/15/24  0036 10/14/24  2250 10/14/24  2156 10/14/24  1837 10/14/24  1328   WBC 7.1 8.9  --   --   --  7.1   HGB 11.1* 11.5* 11.4* 12.1*   < > 13.9    167  --   --   --  221    < > = values in this interval not displayed.       Basic Metabolic Panel  Recent Labs   Lab 10/15/24  0618 10/15/24  0353 10/15/24  0351 10/15/24  0042 10/15/24  0036 10/14/24  2250 10/14/24  2156 10/14/24  2156 10/14/24  1837 10/14/24  1640 10/14/24  1328   NA  --  141  --   --  141 143  --  143 140  --  142   POTASSIUM  --  3.7  --   --  4.1 3.5  --  3.6 3.8  --  3.8   CHLORIDE  --  105  --   --  106  --   --   --  102  --  102   CO2  --  26  --   --  24  --   --   --  28  --  28   BUN  --  12.5  --   --  12.7  --   --   --  11.2  --  10.9   CR  --  0.98  --   --  1.06  --   --   --  1.06  --  1.06   * 135* 140* 145* 150* 123*   < > 104* 100*   < > 117*    < > = values in this interval not displayed.       Liver Function Tests  Recent Labs   Lab 10/15/24  0353 10/15/24  0036 10/14/24  1837   AST 14 16 17   ALT 17 12 22   ALKPHOS 79 77 89   BILITOTAL 0.7 0.8 0.8   ALBUMIN 3.4* 3.2* 3.7   INR  --  1.25* 1.09       Pancreatic  Enzymes  No lab results found in last 7 days.    Coagulation Profile  Recent Labs   Lab 10/15/24  0036 10/14/24  1837   INR 1.25* 1.09   PTT 41* 31       IMAGING:  Recent Results (from the past 24 hour(s))   CT Chest Pulmonary Embolism w Contrast   Result Value    Radiologist flags (LOVE)     Marked progression aneurysmal dilatation thoracic aortic arch with changes concerning for possible mild rupture.    Narrative    EXAM: CT CHEST PULMONARY EMBOLISM W CONTRAST  LOCATION: [facility]  DATE: 10/14/2024    INDICATION: coughing up blood. History of ascending aortic aneurysm repair.  COMPARISON: Most recent 1/22/2020 and 7/25/2020  TECHNIQUE: CT chest pulmonary angiogram during arterial phase injection of IV contrast. Multiplanar reformats and MIP reconstructions were performed. Dose reduction techniques were used.   CONTRAST: Isovue 370 75mL    FINDINGS:  ANGIOGRAM CHEST: Negative for pulmonary emboli. Markedly abnormal aortic arch and descending thoracic aorta.    Postop change of previous aortic root repair. Significant degenerative aneurysmal dilatation of the mid and distal aortic arch just beyond the level of repair measures 9.1 cm. (Previously 5.4 cm in 2020). Degenerative aneurysmal dilatation of the upper   to mid descending thoracic aorta 6.3 cm. Both these sites are concerning for possible mildly with some stranding in the mediastinum around this level. Due to early phase of enhancement minimal contrast material makes it beyond the aortic arch. No CT   evidence of right heart strain.    LUNGS AND PLEURA: Atelectasis in the left lower lobe and infiltrates in the left lower lobe could be due to pneumonia from compressed bronchi versus blood products. Right lung is clear.    MEDIASTINUM/AXILLAE: No free hemorrhage in the mediastinum.    CORONARY ARTERY CALCIFICATION: Mild.    UPPER ABDOMEN: Normal.    MUSCULOSKELETAL: Normal.      Impression    IMPRESSION:  1.  Postop repair of the  ascending thoracic aorta.    2.  Since 7/25/2024 significant progression of the aneurysmal dilatation of the aortic arch 9.1 cm and at the level of the mid descending aorta 6.3 cm with changes concerning for possible mild/early rupture. Due to extremely slow flow early phase of   enhancement minimal contrast makes it beyond the aortic arch.    3.  Atelectasis left lower lobe and infiltrates in the left lower lobe could be due to blood products or possibly pneumonia.      [Critical Result: Marked progression aneurysmal dilatation thoracic aortic arch with changes concerning for possible mild rupture.]    Finding was identified on 10/14/2024 2:24 PM CDT.     Yara Camacho was contacted by me on 10/14/2024 2:45 PM CDT and verbalized understanding of the critical result.    CTA Chest Abdomen Pelvis w Contrast   Result Value    Radiologist flags Aortic hematoma with concern for acute (AA)    Narrative    Exam: Computed tomographic angiography of the chest, abdomen and  pelvis without and with contrast including 3D reformations dated  10/14/2024 6:45 PM    Clinical information: History of ascending and aortic arch aneurysm  with repair (2019) now with enlarged mid and distal thoracic aorta.    Technique: Axial images through the chest and abdomen obtained before  the administration of intravenous contrast media and following the  injection of contrast media  in the arterial phase. Source images  reviewed as well as 3D and multi-planar reconstructions at a 3D  workstation.    Contrast: 90ml Isovue 370    DLP: 3613 mGy*cm    Comparison: Same date CT Pulmonary Angiography, CT 7/25/2020 and  1/22/2020.    Findings:      Postsurgical changes of ascending thoracic aorta repair. Degenerative  aneurysmal dilation of the aorta distal to the anastomosis measuring  9.8 x 8.2 cm in maximum transverse diameter. This is at the site of a  known intimal flap, which is the delineation of two distinct contrast  phases. There is  surrounding hematoma, however no definite extension  of blood products into the mediastinum. There are two areas of  apparent contrast extension into the hematoma (series 12 image 226 and  235. Additional area of aneurysmal dilation with irregular walls is  visualized along the descending aorta measuring approximately 7.5 cm  in largest dimension (S11,I27), previously 6.5 cm (7/25/2020), with  adjacent atelectasis of the lung.    Contrast opacification of the brachiocephalic trunk, left common  carotid, and left subclavian arteries consistent with perfusion.    Unchanged partially calcified structures surrounding the aorta  measuring 11 x 13 mm (series 12 image 879) and an additional measuring  10 x 19 mm (series 12 image 1019), unchanged from prior and without  contrast filling, likely to represent thrombosed arterial saccular  aneurysms versus partially calcified surrounding lymph nodes.    The abdominal aorta at the celiac trunk measuring up to 3.9 cm and 3.6  cm at the bifurcation. Aneurysmal common iliac arteries, measuring 2.8  cm on the left and 2.1 cm on the right.    The celiac axis, SMA and KIRIT are patent. The right hepatic artery  originates directly from the aorta. Both renal arteries are patent.    Chest and abdomen:     No hilar, mediastinal or axillary lymphadenopathy is seen.  Consolidative opacities of the left lower lobe. Debris within adjacent  bronchi    The spleen, liver, adrenal glands, and pancreas show no focal  abnormalities. Unchanged left pelvic kidney. L1 vertebral body  hemangioma. Small umbilical hernia.      Impression    Impression:    1. Aneurysm with degeneration distal to anastomosis of previous  surgical replacement of the ascending aorta, at site of known intimal  flap. The aneurysm measures 9.8 cm in its largest dimension. There are  two distinct contrast phases at the level of the aneurysmal  degeneration consistent with a hemodynamically significant flow  limitation. All great  vessels are perfused and take off prior to the  location of this intimal flap. There is associated hematoma and two  focal projections of contrast within the mediastinum concerning for  impending rupture.    2. Irregular aneurysmal dilation of the descending thoracic aorta, as  above, increased by 1 cm in largest diameter when compared to prior  images (7/25/2020).    3. Aneurysmal abdominal aorta and common iliac arteries, as above.    4. Consolidative opacity of the left lower lobe with associated  bronchial debris, most consistent with aspiration.    [Critical Result: Aortic hematoma with concern for acute  extravasation]    Finding was identified on 10/14/2024 6:53 PM.     Dr. Kbaa was contacted by Dr. River Bella at 10/14/2024 7:03 PM  and verbalized understanding of the critical finding.    I have personally reviewed the examination and initial interpretation  and I agree with the findings.      I have personally reviewed the examination and initial interpretation  and I agree with the findings.    NEEMA SIMMONS MD         SYSTEM ID:  [id number]   XR Surgery LULÚ G/T 5 Min Fluoro w Stills    Narrative    This exam was marked as non-reportable because it will not be read by a   radiologist or a [place] non-radiologist provider.

## 2024-10-15 NOTE — PLAN OF CARE
Hours of Care:  1530 - 0700    A: Neuro: A/O x4.  Call light appropriate.  Able to make needs known.  Respiratory:  On room air.  Lung sounds diminished on the lt.  Denies shortness of breath at rest.  Cardiac: VSS.  SR.  Receives heparin subcutaneous Q8H.  No s/s of bleeding.    GI: Last BM PTA.  No report of nausea or vomiting.  : Jenkins discontinued on prior shift.  Due to void.  Endo:  Blood sugars Q4H.  Last   Skin:  See PCS for assessment and treatment of wounds and surgical incisions.  LDA:  Bilat PIV.  Rt radial arterial line  Antibiotics:  Ancef IV Q8H  Electrolytes: RN managed Mg, K, PO4.    Pain: Reporting right shoulder pain.  Received ice pack.  States some relief.  Isolation:  Special Precautions  Tests/procedures: None performed overnight.  Diet: Regular diet.  NPO after midnight.    Intake/Output Summary (Last 24 hours) at 10/15/2024 1752  Last data filed at 10/15/2024 1621  Gross per 24 hour   Intake 2634.75 ml   Output 1315 ml   Net 1319.75 ml

## 2024-10-15 NOTE — ANESTHESIA PROCEDURE NOTES
Airway       Patient location during procedure: OR       Procedure Start/Stop Times: 10/14/2024 8:39 PM  Staff -        CRNA: Joel Chand APRN CRNA       Performed By: CRNA  Consent for Airway        Urgency: elective  Indications and Patient Condition       Indications for airway management: yoshi-procedural       Induction type:RSI       Mask difficulty assessment: 0 - not attempted    Final Airway Details       Final airway type: endotracheal airway       Successful airway: ETT - single and Oral  Endotracheal Airway Details        ETT size (mm): 7.5       Cuffed: yes       Successful intubation technique: video laryngoscopy       VL Blade Size: MAC 4       Grade View of Cords: 1       Adjucts: stylet       Position: Right       Measured from: lips       Secured at (cm): 23       Bite block used: None    Post intubation assessment        Placement verified by: capnometry        Number of attempts at approach: 1       Number of other approaches attempted: 0       Secured with: tape       Ease of procedure: easy       Dentition: Intact and Unchanged    Medication(s) Administered   Medication Administration Time: 10/14/2024 8:39 PM

## 2024-10-15 NOTE — PROGRESS NOTES
"Vascular Surgery Progress Note    10/15/24   Length of Stay: 1 days    Brief HPI: Yadiel Roth is a 54 year old male with hx significant for total arch repair with traditional elephant trunk with Dr Mosquera in 6/2019, presented to OSH on 10/14/24 with hemoptysis and URI for several days. Diagnosed with COVID, but in the course of workup CTPE showed 9/1 cm dilatation of distal aortic arch; patient transferred to [facility] urgently due to c/f impending rupture.     Procedures:  10/14/24: TEVAR distal to subclavian artery (cTAG 40x200 mm), with distal extension graft (cTAG 45x150 mm)         Interval: Extubated postop. Remains NPO. No complaints this AM aside from stating he is hungry. Denies chest/abdominal/back pain, further hemoptysis. MAP has remained >80 without difficulty, but per nurse has had difficulty keeping SBP<160. Patient reports he has not taken his antihypertensives since 2 days prior. He remains neuro-intact with bounding and symmetric pedal pulses.    Physical Exam:  BP (!) 89/73   Pulse 93   Temp 98.2  F (36.8  C) (Oral)   Resp 19   Ht 1.753 m (5' 9\")   Wt 107.2 kg (236 lb 5.3 oz)   SpO2 100%   BMI 34.90 kg/m     Gen: NAD, AAOx3  CV: RRR by peripheral pulse  Resp: NLB on RA  Abd: Soft, NTND, non-peritonitic  Groins: soft bilaterally. No hematoma.  Ext: 2+ DP/PT bl. Non-palpable L radial pulse, but CR<2 and hands WWP; no hematoma.   Neuro: SILT and 5/5 strength in all extremities.     Pertinent Labs:    Recent Labs   Lab 10/15/24  0353 10/15/24  0036 10/14/24  2250 10/14/24  1837 10/14/24  1328   WBC 7.1 8.9  --   --  7.1   HGB 11.1* 11.5* 11.4*   < > 13.9   HCT 34.3* 35.3*  --   --  42.0    167  --   --  221    < > = values in this interval not displayed.     Recent Labs   Lab 10/15/24  0353 10/15/24  0036 10/14/24  2250 10/14/24  2156 10/14/24  3267    141 143   < > 140   CO2 26 24  --   --  28   BUN 12.5 12.7  --   --  11.2   PHOS 2.7  --   --   --  3.2    < > = values in this " interval not displayed.     Recent Labs   Lab 10/15/24  0353 10/15/24  0036 10/14/24  1837   AST 14 16 17   ALT 17 12 22   ALKPHOS 79 77 89   ALBUMIN 3.4* 3.2* 3.7     Assessment/Plan: 54 year old AAM with h/o total arch with traditional elephant trunk 2019, now with contained rupture at the descending thoracic aorta, 1 Day Post-Op s/p TEVAR for coverage. Doing well clinically, however aortobronchial fistula has not yet been ruled out.     - Discussed need for bronch with sputum cx with SICU team, who will call pulmonology to perform awake bronch either today or tomorrow.   - If no bronch today, okay for diet from vascular surgery standpoint.   - Q1 neuro checks, pulse checks, site checks - must be able to flex at the hips bilaterally.  - MAP>80 and SBP<160 mmHg; will consider liberalizing MAP goals tomorrow.   - ASA, home antihypertensives starting today  - Appreciating SICU assistance with patient's care while in the ICU.  - Jenkins out today.   - DVT ppx: SQH    Disposition: Remain in ICU overnight. Once patient has been bronched and no longer requires any gtts to maintain MAP/BP parameters, will consider [place] to [facility].     Staff: Dr. Ha Barillas MD  PGY-6  Vascular Surgery  Pager: [phone]    Please page me directly with any questions/concerns during regular weekday hours before 5PM. If there is no response, if it is a weekend, or if it is during evening hours, then please use the [employer] system to page the first-call resident on call for vascular surgery.

## 2024-10-15 NOTE — PLAN OF CARE
ICU End of Shift Summary. See flowsheets for vital signs and detailed assessment.    Changes this shift: Patient returned from surgery at 0015 and was extubated. Lethargic and improved to alert and oriented. All nueros intact. Family at bedside after patient arrived to the unit. BP's variable with goal of MAP>80 and systolic <160. 1 time dose of labetalol and Atarax given with somewhat effective results in BP management. PRN dilaudid given with effective results. Patient denies pain. Tolerating oxymask 1L. Jenkins remains in place with adequate output.     Plan: Continue plan of care.       Goal Outcome Evaluation:      Plan of Care Reviewed With: patient, family    Overall Patient Progress: improvingOverall Patient Progress: improving    Outcome Evaluation: Please see shift note

## 2024-10-15 NOTE — BRIEF OP NOTE
[facility]    Brief Operative Note    Pre-operative diagnosis: Contained ruptured thoracic aortic aneurysm  Post-operative diagnosis Same as pre-operative diagnosis    Procedure:   Ultrasound-guided right transfemoral percutaneous access  Ultrasound-guided left transradial percutaneous access  Endovascular repair of descending thoracic aortic aneurysm  Aortogram  Intravascular ultrasound  Right common femoral arteriotomy closure with Perclose device x2  Left radial arteriotomy closure with TransRadial Band    Surgeon: Surgeons and Role:     * Radha Bonner MD - Primary     * Jose Elias Medina MD - Assisting     * Armando Freitas MD - Resident - Assisting  Anesthesia: General   Estimated Blood Loss: 100 ml    Drains: None  Specimens: * No specimens in log *  Findings:  Contained rupture of proximal descending thoracic aortic aneurysm  Partial folded elephant trunk graft  Proximal TEVAR with GORE cTAG 40 mm x 20 cm  Distal TEVAR extension with GORE cTAG 45 mm x 15 cm  Widely patent graft and supraaortic trunk vessels, patent celiac trunk, possible delayed type II endoleak on completion aortography  Palpable bilateral dorsalis pedis pulses , Brisk left palmar arch doppler signal, 2 sec capillary refill in left hand at case completion    Complications: None.  Implants:   Implant Name Type Inv. Item Serial No.  Lot No. LRB No. Used Action   TAG CONFORMABLE THORACIC STENT GRAFT Stent Graft  [id number] GORE  N/A 1 Implanted   TAG CONFORMABLE THORACIC STENT GRAFT Stent Graft  [id number]   N/A 1 Implanted

## 2024-10-15 NOTE — OP NOTE
Operative Note    Name: Yadiel Roth     Location: [facility] OR    Procedure Date:  10/14/2024 - 10/15/2024    PCP:  Titi Griffin    Procedure(s):  ULTRASOUND GUIDED VASCULAR ACCESS X2, THORACIC STENT GRAFT THORACIC STENT GRAFT DISTAL TO LEFT SUBCLAVIAN ARTERY, INTRAVASCULAR ULTRASOUND OF AORTIC ARCH, DESCENDING THORACIC AORTA, LARGE BORE ACCESS CLOSURE X1     Pre-Procedure Diagnosis:    Ruptured, aorta (H) [I71.8]     Post-Procedure Diagnosis:    Same     Surgeon(s):  Jose Elias Medina MD co-surgeon  Armando Freitas MD fellow  Radha Bonner MD co-surgeon    Findings:    On ultrasound-guided vascular access of the right groin: Access gained to the 12 o'clock position on the right common femoral artery  On ultrasound-guided vascular access of the left radial artery: Access gained to the distal radial artery under ultrasound guidance using a micropuncture technique  On initial angiography: Large contained ruptured aneurysm of the distal aortic arch  On IVUS: Fairly collapsed segment of elephant trunk of dacryon graft that was difficult to cannulate from below.    On completion angiography after both components of thoracic stent graft had been placed: No visible endoleak whatsoever.  Maintain patency of visceral branches and of arch branches.    On completion groin duplex after removal of sheath and large-bore access closure: Good positioning of her sutures in the anterior of the artery and good color flow in the native common femoral artery and proximal superficial femoral artery.    Heparin:  [id number] U.  Protamine 30 units given at the end of the case    Estimated Blood Loss:   60 cc's    Specimens:    * No specimens in log *        Implants:  Implant Name Type Inv. Item Serial No.  Lot No. LRB No. Used Action   TAG CONFORMABLE THORACIC STENT GRAFT Stent Graft  [id number]  N/A 1 Implanted   TAG CONFORMABLE THORACIC STENT GRAFT Stent Graft  [id number]   N/A 1 Implanted        Complications:     * No complications entered in OR log *     Brief Clinical Hx:  This is a 54-year-old patient who had undergone a prior artery reconstruction in 2019 that included an elephant trunk.  Patient was lost to follow-up at that time.  Patient felt that he was doing well and was in better shape.  Does not appear to felt that he needed to come back for follow-up.  Now presents with cough and hemoptysis and is COVID-positive.  CTA demonstrates large saccular aneurysm with what appears to be a contained rupture of the distal arch.  Patient was hemodynamically stable.  Transferred [facility] from [facility] for definitive therapy.    gradually improving    Operative Details:  Patient was brought to the operating room and placed supine on the C arm table.  He underwent intubation using a protocol to avoid spreading of COVID-19.  Thereafter the team operated with N95 masks on.  The patient was prepped and draped for access to his chest, abdomen, both groins, and left forearm.    Under ultrasound guidance needle access was gained to the right common femoral artery at the 12 o'clock position and C-Phen Glidewire was used to deliver a 6 Vietnamese sheath.  2 Pro-glide's were then deployed at 90 degrees to each other in a preclosed fashion in the groin.  A 9 Vietnamese sheath was then exchanged and.  At the level of the left wrist under ultrasound guidance micropuncture technique was used to deliver a slender 5 Vietnamese sheath.  Through this an Arrow J Glidewire was advanced to the level of the left subclavian artery Omni Flush catheter was then exchanged in with it.  The catheter was used to navigate the aortic arch and advanced our wire down into the descending thoracic aorta.    From the groin we advanced the IVUS catheter with a stiff angled Glidewire and followed it up to the level of the proximal descending thoracic aorta.  We could see that we bypass the elephant trunk and into the space of the surrounding aneurysm.  We could not  easily navigate her way into the lumen of the elephant trunk from below.  We now heparinized the patient with 12,000 units of heparin IV.    At this point, from the groin, we exchanged and a 24 Ukrainian dry seal sheath over a Lunderquist wire.  Through this sheath we now advanced a snare.  We exchanged our Glidewire and the left arm access for a 450 cm wire which we then snared from below.  This allowed us to gain through and through access.  From the groin, through the 22 Ukrainian sheath, we advanced a 5 Ukrainian 90 cm sheath which we were then able to bring into our elephant trunk and into the transverse arch.  With the sheath at this level we advanced a second wire through the 6 Ukrainian sheath to the level of the ascending aorta.  We introduced a IVUS catheter and performed IVUS through the thoracic aorta and arch    Findings: Patent ascending aorta.  Widely patent innominate graft branch, left carotid artery branch, and left subclavian graft branch.  Some compression and collapse of the transverse dacryon arch graft and additional narrowing of the elephant trunk component.  Beyond this there is a very large aneurysm of the descending thoracic aorta.  After approximately 10 cm this starts to taper down to a diameter of approximately 37 mm.    At this point, from the right groin we introduced to our [place] 40 mm x 20 cm thoracic stent graft.  This was advanced over the Lunderquist wire and we used the wire coming from the left arm to localize the subclavian artery.  Over this second wire we advanced our IVUS catheter and confirmed that we had approximately 9 cm of seal zone within our dacryon elephant trunk graft beyond the left subclavian artery.  We now deployed the thoracic stent graft from just beyond the left subclavian artery, through the dacryon component, and extending into the native proximal descending thoracic aorta.  After removing the delivery system we exchanged and a second thoracic stent graft this 145 mm  in diameter by 15 cm in length and we deployed this with adequate overlap to the proximal graft and with approximately 5 cm of the distal seal and the more healthy portion of descending thoracic aorta.  This was still more than 10 cm away from the celiac artery.  Because of some fairly sharp angulation at the aortic knob we introduced trilobed balloon and performed balloon angioplasty of this segment as well as the proximal seal zone.  We also balloon the junction between the components of the grafts.    Using the left arm access we lost our through and through wire access but advanced our marker catheter into the ascending aorta.  We performed a completion aortogram from this level    Findings: Good positioning of endograft.  No evidence of endoleak whatsoever.  Maintain patency of the left subclavian artery as well as of the visceral vessels.    At this point we removed our left arm catheter, removed our 5 Bahraini left arm sheath in the applied a TR band for hemostasis.  In the right groin we removed our 24 Bahraini sheath and deployed our Pro-glide's to close the access point.  Completion duplex was performed showing good positioning of her Pro-glide sutures on the anterior wall of the artery, no extravasation, and biphasic flow through the vessels.    The axis site in the groin was further closed with a 4-0 Vicryl and Dermabond.    Procedure had been well-tolerated.  We contemplated performing a bronchoscopy and cultures but did not have the equipment at this point in the night to perform the procedure.  We plan to do this in the near future.      Radha Bonner MD     Date: 10/15/2024  Time:5:41 AM

## 2024-10-15 NOTE — ANESTHESIA CARE TRANSFER NOTE
Patient: Yadiel Blantonwood    Procedure: Procedure(s):  ULTRASOUND GUIDED VASCULAR ACCESS X2, THORACIC STENT GRAFT THORACIC STENT GRAFT DISTAL TO LEFT SUBCLAVIAN ARTERY, INTRAVASCULAR ULTRASOUND OF AORTIC ARCH, DESCENDING THORACIC AORTA, LARGE BORE ACCESS CLOSURE X1       Diagnosis: Ruptured, aorta (H) [I71.8]  Diagnosis Additional Information: No value filed.    Anesthesia Type:   General     Note:    Oropharynx: oropharynx clear of all foreign objects and spontaneously breathing  Level of Consciousness: awake  Oxygen Supplementation: face mask  Level of Supplemental Oxygen (L/min / FiO2): 8  Independent Airway: airway patency satisfactory and stable  Dentition: dentition unchanged  Vital Signs Stable: post-procedure vital signs reviewed and stable  Report to RN Given: handoff report given  Patient transferred to: ICU    ICU Handoff: Call for PAUSE to initiate/utilize ICU HANDOFF, Identified Patient, Identified Responsible Provider, Reviewed the Pertinent Medical History, Discussed Surgical Course, Reviewed Intra-OP Anesthesia Management and Issues during Anesthesia, Set Expectations for Post Procedure Period and Allowed Opportunity for Questions and Acknowledgement of Understanding      Vitals:  Vitals Value Taken Time   /67    Temp     Pulse 76 10/15/24 0031   Resp 16 10/15/24 0031   SpO2 97 % 10/15/24 0032   Vitals shown include unfiled device data.    Electronically Signed By: TISH Abraham CRNA  October 15, 2024  12:33 AM

## 2024-10-15 NOTE — OP NOTE
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 Mosquera and Dr. 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 Mosquera and Dr. Preston 6/10/2019 with vocal cord paralysis subsequently lost to follow up  #5- Hypertension  #6- Hyperlipidemia  #7- Diabetes mellitus, type 2  #8- BMI 34  #9- Migraines  #10- Gout  #11- Fatty liver disease    Surgeon: Radha Maza MD  Co-Surgeon: Jose Elias Medina MD  First Assistant: Armando Freitas MD (PGY 5 vascular surgery resident)  I acted as a co-surgeon due to the complexity of the case with previous arch repair and complex rupture. I performed through and through radial-femoral access to facilitate cannulation and straightening of the elephant trunk, abhijit wire access, and assisted surgical decision making.      Procedures:  Endovascular repair of contained rupture of arch and proximal descending thoracic aortic aneurysm using 40x200 mm [place] cTAG thoracic stent-graft deployed in Zone  3-4 .  Distal thoracic extension using  45x150 mm [place] cTAG thoracic stent-graft deployed in Zone  4-5 .  Arch and thoracic angiography.  Intravascular ultrasound of the ascending, arch, and thoracoabdominal aorta.  Balloon dilatation of overlap, proximal and distal landing zones.  Total percutaneous right femoral approach with Perclose device, 24-Fr sheath on the right groin.  Left percutaneous radial access with placement of 5-Fr sheath on the left wrist with TR band closure      Findings:   Large contained distal arch and descending thoracic aortic aneurysm.   IVUS confirmation of cannulation of the elephant trunk and sizing  Radial-femoral through and through access.  Patent innominate, left carotid and subclavian at beginning and completion of procedure.  Compression of elephant trunk by aneurysm and rupture improved after stenting and balloon dilation. No type I or III endoleaks at completion.    Indication for procedure:    Mr. Roth is a 54 year old male with a history of total arch repair and subsequent loss to follow up.  He presented today with hemoptysis and was found to have contained rupture of the now exceedingly large distal arch and proximal descending thoracic aortic aneurysm    Description of procedure:    Operative details:    The patient was brought to the operating room with a C arm.  Under satisfactory eneral anesthesia, he was placed in supine arms down position with all pressure points padded in standard fashion.  The chest, abdomen, groins, left wrist and thighs were widely prepped and draped in sterile, standard fashion.  A surgical pause was performed with all members of the surgical team to verify patient, medical record number, birth date, planned surgical procedure, surgical site, allergies, fire risk and perioperative antibiotics.    Under realtime ultrasound guidance, percutaneous left retrograde transradial access was established with a micropuncture set and exchanged for 0.035-inch system. The patient was  systemically heparinized and radial cocktail given. An ACT greater than 250 seconds was obtained. A 5-Kinyarwanda slender sheath system with omni catheter was introduced via the left transradial approach. With an omni and glidewire, access was gained to the descending thoracic aorta via the elephant trunk.    Under realtime ultrasound guidance, percutaneous right retrograde transfemoral access was established with a micropuncture set and exchanged for 0.035-inch system.  The systems were upsized to a 6 Kinyarwanda sheath.The right femoral puncture site was preclosed using two Perclose devices and a 9 Fr sheath was placed. The wire advanced from the radial artery was snared with an April snare and exchanged for a metrowire.  The sheath was upsized to a 24 Fr sheath over the metrowire with care to protect the subclavian and arch with a catheter.  Abhijit access was gained to the sheath and access was gained into the ascending aorta with a curly Lunderquist wire from the coaxial abhijit wire where a 5 Fr sheath was advanced.  Ascending and arch aortogram was used to calibrate landmarks.  IVUS was used to erickson the elephant trunk and confirm sizing through the ascending, arch, and thoracoabdominal aorta.  A 40x200-mm cTAG aortic stent graft was introduced via the right transfemoral approach.  The thoracic stent graft was then adjusted and deployed with its proximal edge just distal to the subclavian origin. The delivery system was removed and the a 45x150 mm cTAG thoracic stent graft was introduced. IVUS was used to identify the landmarks again. The next thoracic stent graft was then adjusted and deployed beyond the aneurysm in zone 5.  The proximal/distal landing zone and graft overlap sites were ballooned with a trilobe balloon. Completion digital subtraction angiogram was performed and demonstrated widely patent stent graft, no signs of retrograde dissection, rupture, or any other technical defect .  There was no evidence of type I  or III endoleaks.  The supra-aortic trunks were patent. All sheaths and wires were removed.  The Perclose devices were tightened and were hemostatic. A TR band was used in left the radial puncture.  All needle and sponge counts were correct. Groin incisions were closed using running Monocryl sutures for the subcuticular layer. A sterile dressing was applied. The patient was awoken in the operating room and was able to move all 4 extremities to command with intact proximal strength. The patient was extubated and transferred to the ICU in stable condition.    The patient had palpable pulses at completion, stable from his baseline.    Contrast: 45 mL  Flouro time: 26.5 min  Radiation: 533 mGy    Estimated blood loss: 60 cc    Specimens: none     Complications: None apparent    Plan:  -per Dr. Maza  -Spinal drain at bedside          Jose Elias Medina MD, VI  VASCULAR AND ENDOVASCULAR SURGERY   [facility] Vascular Surgery

## 2024-10-15 NOTE — ANESTHESIA PROCEDURE NOTES
Arterial Line Procedure Note    Pre-Procedure   Staff -        Anesthesiologist:  Karlee Le MD       Resident/Fellow: Kojo Stratton MD       Performed By: resident and with residents       Procedure performed by resident/fellow/CRNA in presence of a teaching physician.         Location: OR       Pre-Anesthestic Checklist: patient identified, IV checked, risks and benefits discussed, informed consent, monitors and equipment checked, pre-op evaluation and at physician/surgeon's request  Timeout:       Correct Patient: Yes        Correct Procedure: Yes        Correct Site: Yes        Correct Position: Yes   Line Placement:   This line was placed Post Induction  Procedure   Procedure: arterial line and elective       Diagnosis: Blood Pressure Monitoring and/or Frequent Lab Draws       Laterality: right       Insertion Site: radial.  Sterile Prep        Standard elements of sterile barrier followed       Skin prep: Chloraprep  Insertion/Injection        Technique: Darío's test completed, Seldinger Technique and ultrasound guided        1. Ultrasound was used to evaluate the access site.       2. Artery evaluated via ultrasound for patency/adequacy.       3. Using real-time ultrasound the needle/catheter was observed entering the artery/vein.       Catheter Type/Size: 20 G, 12 cm  Narrative         Secured by: suture       Tegaderm dressing used.       Complications: None apparent,        Arterial waveform: Yes        IBP within 10% of NIBP: Yes   Comments:  Routine arterial line placement without complications.
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
## 2024-10-16 ENCOUNTER — APPOINTMENT (OUTPATIENT)
Dept: GENERAL RADIOLOGY | Facility: CLINIC | Age: 54
End: 2024-10-16
Payer: COMMERCIAL

## 2024-10-16 LAB
ALBUMIN SERPL BCG-MCNC: 3.3 G/DL (ref 3.5–5.2)
ALP SERPL-CCNC: 70 U/L (ref 40–150)
ALT SERPL W P-5'-P-CCNC: 15 U/L (ref 0–70)
ANION GAP SERPL CALCULATED.3IONS-SCNC: 8 MMOL/L (ref 7–15)
AST SERPL W P-5'-P-CCNC: 16 U/L (ref 0–45)
BILIRUB SERPL-MCNC: 0.6 MG/DL
BUN SERPL-MCNC: 10.3 MG/DL (ref 6–20)
CA-I BLD-MCNC: 4.4 MG/DL (ref 4.4–5.2)
CALCIUM SERPL-MCNC: 8.4 MG/DL (ref 8.8–10.4)
CHLORIDE SERPL-SCNC: 104 MMOL/L (ref 98–107)
CREAT SERPL-MCNC: 0.81 MG/DL (ref 0.67–1.17)
EGFRCR SERPLBLD CKD-EPI 2021: >90 ML/MIN/1.73M2
ERYTHROCYTE [DISTWIDTH] IN BLOOD BY AUTOMATED COUNT: 13.8 % (ref 10–15)
GLUCOSE BLDC GLUCOMTR-MCNC: 103 MG/DL (ref 70–99)
GLUCOSE BLDC GLUCOMTR-MCNC: 113 MG/DL (ref 70–99)
GLUCOSE BLDC GLUCOMTR-MCNC: 136 MG/DL (ref 70–99)
GLUCOSE SERPL-MCNC: 115 MG/DL (ref 70–99)
HCO3 SERPL-SCNC: 27 MMOL/L (ref 22–29)
HCT VFR BLD AUTO: 32.1 % (ref 40–53)
HGB BLD-MCNC: 10.5 G/DL (ref 13.3–17.7)
MAGNESIUM SERPL-MCNC: 2 MG/DL (ref 1.7–2.3)
MCH RBC QN AUTO: 28.2 PG (ref 26.5–33)
MCHC RBC AUTO-ENTMCNC: 32.7 G/DL (ref 31.5–36.5)
MCV RBC AUTO: 86 FL (ref 78–100)
PHOSPHATE SERPL-MCNC: 2.7 MG/DL (ref 2.5–4.5)
PLATELET # BLD AUTO: 173 10E3/UL (ref 150–450)
POTASSIUM SERPL-SCNC: 3.5 MMOL/L (ref 3.4–5.3)
PROT SERPL-MCNC: 6.6 G/DL (ref 6.4–8.3)
RBC # BLD AUTO: 3.72 10E6/UL (ref 4.4–5.9)
SODIUM SERPL-SCNC: 139 MMOL/L (ref 135–145)
WBC # BLD AUTO: 8.9 10E3/UL (ref 4–11)

## 2024-10-16 PROCEDURE — 250N000013 HC RX MED GY IP 250 OP 250 PS 637: Performed by: NURSE PRACTITIONER

## 2024-10-16 PROCEDURE — 250N000009 HC RX 250: Performed by: SURGERY

## 2024-10-16 PROCEDURE — 83735 ASSAY OF MAGNESIUM: CPT

## 2024-10-16 PROCEDURE — 71045 X-RAY EXAM CHEST 1 VIEW: CPT

## 2024-10-16 PROCEDURE — 250N000013 HC RX MED GY IP 250 OP 250 PS 637: Performed by: STUDENT IN AN ORGANIZED HEALTH CARE EDUCATION/TRAINING PROGRAM

## 2024-10-16 PROCEDURE — 250N000013 HC RX MED GY IP 250 OP 250 PS 637

## 2024-10-16 PROCEDURE — 250N000011 HC RX IP 250 OP 636: Performed by: STUDENT IN AN ORGANIZED HEALTH CARE EDUCATION/TRAINING PROGRAM

## 2024-10-16 PROCEDURE — 250N000013 HC RX MED GY IP 250 OP 250 PS 637: Performed by: SURGERY

## 2024-10-16 PROCEDURE — 999N000157 HC STATISTIC RCP TIME EA 10 MIN

## 2024-10-16 PROCEDURE — 71045 X-RAY EXAM CHEST 1 VIEW: CPT | Mod: 26 | Performed by: STUDENT IN AN ORGANIZED HEALTH CARE EDUCATION/TRAINING PROGRAM

## 2024-10-16 PROCEDURE — 82330 ASSAY OF CALCIUM: CPT

## 2024-10-16 PROCEDURE — 250N000012 HC RX MED GY IP 250 OP 636 PS 637: Performed by: STUDENT IN AN ORGANIZED HEALTH CARE EDUCATION/TRAINING PROGRAM

## 2024-10-16 PROCEDURE — 120N000003 HC R&B IMCU UMMC

## 2024-10-16 PROCEDURE — 84100 ASSAY OF PHOSPHORUS: CPT

## 2024-10-16 PROCEDURE — 250N000011 HC RX IP 250 OP 636

## 2024-10-16 PROCEDURE — 85014 HEMATOCRIT: CPT

## 2024-10-16 PROCEDURE — 94660 CPAP INITIATION&MGMT: CPT

## 2024-10-16 PROCEDURE — 250N000011 HC RX IP 250 OP 636: Mod: JW | Performed by: STUDENT IN AN ORGANIZED HEALTH CARE EDUCATION/TRAINING PROGRAM

## 2024-10-16 PROCEDURE — 258N000003 HC RX IP 258 OP 636: Performed by: SURGERY

## 2024-10-16 PROCEDURE — 80053 COMPREHEN METABOLIC PANEL: CPT

## 2024-10-16 PROCEDURE — 99232 SBSQ HOSP IP/OBS MODERATE 35: CPT | Mod: 24

## 2024-10-16 RX ORDER — SENNOSIDES 8.6 MG
1 TABLET ORAL 2 TIMES DAILY
Status: DISCONTINUED | OUTPATIENT
Start: 2024-10-16 | End: 2024-10-18 | Stop reason: HOSPADM

## 2024-10-16 RX ORDER — MULTIPLE VITAMINS W/ MINERALS TAB 9MG-400MCG
1 TAB ORAL DAILY
Status: DISCONTINUED | OUTPATIENT
Start: 2024-10-16 | End: 2024-10-18 | Stop reason: HOSPADM

## 2024-10-16 RX ORDER — MAGNESIUM OXIDE 400 MG/1
400 TABLET ORAL EVERY 4 HOURS
Status: COMPLETED | OUTPATIENT
Start: 2024-10-16 | End: 2024-10-16

## 2024-10-16 RX ORDER — LIDOCAINE 4 G/G
1 PATCH TOPICAL
Status: DISCONTINUED | OUTPATIENT
Start: 2024-10-16 | End: 2024-10-18 | Stop reason: HOSPADM

## 2024-10-16 RX ORDER — ACETAMINOPHEN 500 MG
500-1000 TABLET ORAL EVERY 6 HOURS PRN
COMMUNITY

## 2024-10-16 RX ORDER — POLYETHYLENE GLYCOL 3350 17 G/17G
17 POWDER, FOR SOLUTION ORAL DAILY
Status: DISCONTINUED | OUTPATIENT
Start: 2024-10-16 | End: 2024-10-18 | Stop reason: HOSPADM

## 2024-10-16 RX ORDER — CAPSAICIN 0.025 %
CREAM (GRAM) TOPICAL 3 TIMES DAILY PRN
COMMUNITY

## 2024-10-16 RX ORDER — DOCUSATE SODIUM 100 MG/1
100 CAPSULE, LIQUID FILLED ORAL 2 TIMES DAILY PRN
Status: DISCONTINUED | OUTPATIENT
Start: 2024-10-16 | End: 2024-10-18 | Stop reason: HOSPADM

## 2024-10-16 RX ORDER — AMLODIPINE BESYLATE 10 MG/1
10 TABLET ORAL EVERY EVENING
Status: DISCONTINUED | OUTPATIENT
Start: 2024-10-16 | End: 2024-10-17

## 2024-10-16 RX ADMIN — METOPROLOL TARTRATE 100 MG: 50 TABLET, FILM COATED ORAL at 07:56

## 2024-10-16 RX ADMIN — ATORVASTATIN CALCIUM 40 MG: 40 TABLET, FILM COATED ORAL at 22:43

## 2024-10-16 RX ADMIN — HYDRALAZINE HYDROCHLORIDE 10 MG: 20 INJECTION INTRAMUSCULAR; INTRAVENOUS at 00:10

## 2024-10-16 RX ADMIN — MAGNESIUM OXIDE TAB 400 MG (241.3 MG ELEMENTAL MG) 400 MG: 400 (241.3 MG) TAB at 07:56

## 2024-10-16 RX ADMIN — Medication 1 TABLET: at 09:35

## 2024-10-16 RX ADMIN — LIDOCAINE 1 PATCH: 4 PATCH TOPICAL at 07:55

## 2024-10-16 RX ADMIN — POTASSIUM PHOSPHATE, MONOBASIC POTASSIUM PHOSPHATE, DIBASIC 9 MMOL: 224; 236 INJECTION, SOLUTION, CONCENTRATE INTRAVENOUS at 06:45

## 2024-10-16 RX ADMIN — METOPROLOL TARTRATE 100 MG: 50 TABLET, FILM COATED ORAL at 19:43

## 2024-10-16 RX ADMIN — METHOCARBAMOL 500 MG: 500 TABLET ORAL at 07:56

## 2024-10-16 RX ADMIN — ACETAMINOPHEN 975 MG: 325 TABLET, FILM COATED ORAL at 14:09

## 2024-10-16 RX ADMIN — ACETAMINOPHEN 975 MG: 325 TABLET, FILM COATED ORAL at 07:55

## 2024-10-16 RX ADMIN — MAGNESIUM OXIDE TAB 400 MG (241.3 MG ELEMENTAL MG) 400 MG: 400 (241.3 MG) TAB at 09:35

## 2024-10-16 RX ADMIN — ASPIRIN 81 MG CHEWABLE TABLET 81 MG: 81 TABLET CHEWABLE at 07:57

## 2024-10-16 RX ADMIN — ACETAMINOPHEN 975 MG: 325 TABLET, FILM COATED ORAL at 19:43

## 2024-10-16 RX ADMIN — SENNOSIDES 1 TABLET: 8.6 TABLET, FILM COATED ORAL at 07:56

## 2024-10-16 RX ADMIN — HEPARIN SODIUM 5000 UNITS: 5000 INJECTION, SOLUTION INTRAVENOUS; SUBCUTANEOUS at 14:09

## 2024-10-16 RX ADMIN — ACETAMINOPHEN 975 MG: 325 TABLET, FILM COATED ORAL at 03:40

## 2024-10-16 RX ADMIN — HEPARIN SODIUM 5000 UNITS: 5000 INJECTION, SOLUTION INTRAVENOUS; SUBCUTANEOUS at 22:43

## 2024-10-16 RX ADMIN — LABETALOL HYDROCHLORIDE 10 MG: 5 INJECTION, SOLUTION INTRAVENOUS at 03:59

## 2024-10-16 RX ADMIN — ALLOPURINOL 100 MG: 100 TABLET ORAL at 07:56

## 2024-10-16 RX ADMIN — SENNOSIDES 1 TABLET: 8.6 TABLET, FILM COATED ORAL at 19:43

## 2024-10-16 RX ADMIN — INSULIN ASPART 4 UNITS: 100 INJECTION, SOLUTION INTRAVENOUS; SUBCUTANEOUS at 14:09

## 2024-10-16 ASSESSMENT — ACTIVITIES OF DAILY LIVING (ADL)
ADLS_ACUITY_SCORE: 38
ADLS_ACUITY_SCORE: 44
ADLS_ACUITY_SCORE: 44
ADLS_ACUITY_SCORE: 38
ADLS_ACUITY_SCORE: 38
ADLS_ACUITY_SCORE: 44
ADLS_ACUITY_SCORE: 37
ADLS_ACUITY_SCORE: 44
ADLS_ACUITY_SCORE: 37
ADLS_ACUITY_SCORE: 38
ADLS_ACUITY_SCORE: 37
ADLS_ACUITY_SCORE: 38
ADLS_ACUITY_SCORE: 37
ADLS_ACUITY_SCORE: 44
ADLS_ACUITY_SCORE: 38
ADLS_ACUITY_SCORE: 37

## 2024-10-16 NOTE — PLAN OF CARE
Major Shift Events:  A&Ox4, makes needs known. Ambulating in room & up in chair. SR 60-70s, afebrile. MAP goal liberalized to >70, SBP <160; art line removed. Possible murmur detected on auscultation. R groin and L wrist sites WDL, CMS intact and pulses strong. L lung sounds barely perceptible. R lung fields clear/diminished. Sating >92% on RA. Using IS independently. Decreased hemoptysis, x1 dark red clot expelled today. Tolerating regular diet. No BM, continue bowel reg. Voiding with urinal.    Plan: Transfer out of ICU to [facility] when bed available. Report given to 6B RN at 1905, pt will be transferred when bed clean. Continue with CMS and pulse checks.    For vital signs and complete assessments, please see documentation flowsheets.      Goal Outcome Evaluation:      Plan of Care Reviewed With: patient    Overall Patient Progress: improvingOverall Patient Progress: improving    Outcome Evaluation: transfer orders placed today

## 2024-10-16 NOTE — PHARMACY-ADMISSION MEDICATION HISTORY
Pharmacy Intern Admission Medication History    Admission medication history is complete. The information provided in this note is only as accurate as the sources available at the time of the update.    Information Source(s): Patient and CareEverywhere/SureScripts via phone    Pertinent Information:  Last doses - admitted 10/14, last doses marked as past week.    Changes made to PTA medication list:  Added: capsaicin cream  Deleted: no records in the past year found, patient does not report taking currently.  Cepacol lozenges  Dextromethorphan liquid  Furosemide   Guaifenesin solution  Naproxen  Miralax  Potassium  Senna/docusate  Tamsulosin  Changed: None    Allergies reviewed with patient and updates made in EHR: yes    Medication History Completed By: Brittany Love 10/16/2024 10:32 AM    PTA Med List   Medication Sig Last Dose    acetaminophen (TYLENOL) 500 MG tablet Take 500-1,000 mg by mouth every 6 hours as needed for pain. Past Month at PRN    allopurinol (ZYLOPRIM) 100 MG tablet Take 1 tablet (100 mg) by mouth daily Past Week    amLODIPine (NORVASC) 10 MG tablet Take 1 tablet (10 mg) by mouth every evening Past Week    aspirin (ASA) 81 MG chewable tablet Take 1 tablet (81 mg) by mouth daily Past Week    atorvastatin (LIPITOR) 40 MG tablet Take 1 tablet (40 mg) by mouth at bedtime Past Week    capsaicin (ZOSTRIX) 0.025 % external cream Apply topically 3 times daily as needed (sore muscles/joints). Past Month at PRN    docusate sodium (COLACE) 100 MG tablet Take 1 tablet (100 mg) by mouth 2 times daily as needed for constipation More than a month at PRN    metoprolol tartrate (LOPRESSOR) 100 MG tablet Take 1 tablet (100 mg) by mouth 2 times daily Past Week    Multiple Vitamin (MULTIVITAMIN PO) Take 1 tablet by mouth daily. Past Week

## 2024-10-16 NOTE — PROGRESS NOTES
"Vascular Surgery Progress Note    10/16/24   Length of Stay: 2 days    Brief HPI: Yadiel Roth is a 54 year old male with hx significant for total arch repair with traditional elephant trunk with Dr Mosquera in 6/2019, presented to OSH on 10/14/24 with hemoptysis and URI for several days. Diagnosed with COVID, but in the course of workup CTPE showed 9/1 cm dilatation of distal aortic arch; patient transferred to [facility] urgently due to c/f impending rupture.     Procedures:  10/14/24: TEVAR distal to subclavian artery (cTAG 40x200 mm), with distal extension graft (cTAG 45x150 mm)         Interval: No complaints this AM, asks appropriate questions. Denies chest/abdominal/back pain. Pending bronch to rule our broncho-aortic fistula. Remains NPO. Neuro-intact with bounding and symmetric pedal pulses. Home medications have been reconciled. Holding the amlodipine until his bronch has completed and will consider resuming tonight or tomorrow AM.    Physical Exam:  BP (!) 144/61   Pulse 77   Temp 99.4  F (37.4  C) (Oral)   Resp 20   Ht 1.753 m (5' 9\")   Wt 109.2 kg (240 lb 11.9 oz)   SpO2 96%   BMI 35.55 kg/m     Gen: NAD, AAOx3  CV: RRR by peripheral pulse  Resp: NLB on RA  Abd: Soft, NTND, non-peritonitic  Groins: soft bilaterally. No hematoma.  Ext: 2+ DP/PT bl. Non-palpable L radial pulse, but CR<2 and hands WWP; no hematoma.   Neuro: SILT and 5/5 strength in all extremities.     Pertinent Labs:    Recent Labs   Lab 10/16/24  0355 10/15/24  0353 10/15/24  0036   WBC 8.9 7.1 8.9   HGB 10.5* 11.1* 11.5*   HCT 32.1* 34.3* 35.3*    156 167     Recent Labs   Lab 10/16/24  0355 10/15/24  0353 10/15/24  0036 10/14/24  2156 10/14/24  1837    141 141   < > 140   CO2 27 26 24  --  28   BUN 10.3 12.5 12.7  --  11.2   PHOS 2.7 2.7  --   --  3.2    < > = values in this interval not displayed.     Recent Labs   Lab 10/16/24  0355 10/15/24  0353 10/15/24  0036   AST 16 14 16   ALT 15 17 12   ALKPHOS 70 79 77 "   ALBUMIN 3.3* 3.4* 3.2*     Assessment/Plan: 54 year old AAM with h/o total arch with traditional elephant trunk 2019, now with contained rupture at the descending thoracic aorta, 2 Days Post-Op s/p TEVAR for coverage with Dr. Bonner and Dr. Medina 10/14. Doing well clinically, however aortobronchial fistula has not yet been ruled out.     - Interventional pulm for bronch with sputum cx with SICU team (concern for broncho-aortic fistula), (see below addendum).  - ADAT  - Q2 neurovascular checks - must be able to flex at the hips bilaterally, assess sensory and motor function.  - MAP>70 and SBP<160 mmHg   - ASA, home antihypertensives starting tonight or tomorrow  - DVT ppx: SQH    [place] [facility] [place]      Staff: Dr. Bonner    Addendum: Communicated with pulm team regarding bronch for Mr. Roth, to rule out bronco-aortic fistula. They noted that patient is COVID+ and if it's not an absolute emergency they would prefer to defer for now. They also noted that usually a bronco-aortic fistula (because of the high pressure system) would be devastating however given patient's progress this is unlikely. They feel that usually there is no such thing as subclinical bronco-aortic fistula but if it's true subclinical and we feel strong about it, they would do the bronch in the endoscopy suite and the patient would have to be intubated, sedated and paralyzed for the procedure. Dr. Escobar to convene with Dr. Martin separately and discuss this further. They will reach out to us to communicate their further discussion/thoughts. Radiology also does not report the presence of a fistula.   This was communicated with adolfo Brown with no bronch for now.     Addendum: Received call from Dr. Escobar who convened with Dr. Martin, CTA was reviewed with radiology, no bronco-aortic fistula is noted, it is highly unlikely for patient to have it. No intervention is warranted for now.    Christie Heller, CNP  Vascular  Surgery  Pager: [phone]  [email or web address]  Send message or 10 digit call back number Securely via iZ[employer] with the i[employer] Web Console (learn more here)

## 2024-10-16 NOTE — ANESTHESIA POSTPROCEDURE EVALUATION
Patient: Yadiel Roth    Procedure: Procedure(s):  ULTRASOUND GUIDED VASCULAR ACCESS X2, THORACIC STENT GRAFT THORACIC STENT GRAFT DISTAL TO LEFT SUBCLAVIAN ARTERY, INTRAVASCULAR ULTRASOUND OF AORTIC ARCH, DESCENDING THORACIC AORTA, LARGE BORE ACCESS CLOSURE X1       Anesthesia Type:  General    Note:  Disposition: ICU            ICU Sign Out: Anesthesiologist/ICU physician sign out WAS performed   Postop Pain Control: Uneventful            Sign Out: Well controlled pain   PONV: No   Neuro/Psych: Uneventful            Sign Out: Acceptable/Baseline neuro status   Airway/Respiratory: Uneventful            Sign Out: Acceptable/Baseline resp. status   CV/Hemodynamics: Uneventful            Sign Out: Acceptable CV status; No obvious hypovolemia; No obvious fluid overload   Other NRE: NONE   DID A NON-ROUTINE EVENT OCCUR?            Last vitals:  Vitals:    10/16/24 0900 10/16/24 1000 10/16/24 1100   BP:      Pulse: 60 67 66   Resp: (!) 8 20 16   Temp:      SpO2: 92% 98% 98%       Electronically Signed By: Karlee Le MD  October 16, 2024  11:27 AM

## 2024-10-16 NOTE — PLAN OF CARE
ICU End of Shift Summary. See flowsheets for vital signs and detailed assessment.    Changes this shift: Patient alert and oriented, neuro intact, patient able to flex bilaterally at hip,SBA, denies pain, afebrile, prn hydralazine and labetalol given to keep MAP>80 and systolic BP<160, minimum effect, tele sinus rhythm, remains on room air, denies shortness of breath, lung sound diminished, infrequent bloody cough, CPAP overnight, no bowel movement, uses urinal voiding well, NPO for possible bronch today.    Plan: Continue to monitor per plan of care.    Goal Outcome Evaluation:      Plan of Care Reviewed With: patient    Overall Patient Progress: no changeOverall Patient Progress: no change

## 2024-10-16 NOTE — PROGRESS NOTES
"[facility]    ICU Progress Note       Date of Admission:  10/14/2024    Assessment: Critical Care   Yadiel Roth is a 54 year old male with PMH of thoracic ascending aortic aneurysm s/p repair 6/2019 with Dr. Mosquera c/b postop left vocal cord paralysis, migraines, HTN, dyslipidemia, gout, and DM2. Presented to [facility] with c/o hemoptysis and URI for several days. Found to be COVID +. CT scan obtained for PE eval, found significant dilation of the mid and distal aortic arch measuring 9.1cm. Transferred to [facility] for further evaluation. Underwent TEVAR with Dr. Bonner and Dr. Medina 10/14. Extubated in PACU.  Remains in ICU for Q1H neuro checks and MAP goal >80 for spinal cord perfusion precautions.        Changes Today:  - Repeat CXR  - Discuss bronch plan with interventional pulm and vascular  - Add lidocaine patches  - Discontinue IV dilaudid  - Schedule bowel regimen    Plan: Critical Care   Neuro/ pain/ sedation:  - Monitor neurological status. Notify the MD for any acute changes in exam.  # Acute pain  # Hx migraines  - Scheduled: tylenol, lidocaine patches  - PRN: acetaminophen, robaxin   - Discontinue IV dilaudid     Pulmonary care:   # COVID+ 10/14/2024  # Hemoptysis  # C/f broncho-aortic fistula  # Hx vocal cord paralysis  BiPAP overnight to aid with lung expansion. Minimal hemoptysis overnight - pt states its \"improving\"  - Interventional pulmonology consulted r/t concern for broncho-aortic fistula, appreciate recs  - NPO for possible diagnostic bronch with interventional pulm  - Repeat CXR post BiPAP overnight: mild improvement in aeration to upper left lobepulmonology today  - Goal SpO2 > 92%  - Oxygenating well on room air.  - Encourage IS q15-30 minutes when awake. OOB as tolerated      Cardiovascular:    # S/p thoracic endovascular aortic repair 10/14  # Aortic arch dilation  # Hx Ascending and aortic arch aneurysm repair with elephant trunk with " branched graft to cerebral vessels 2019  # Hx HTN  # Hx dyslipidemia  X1 dose each of hydralazine and labetalol given overnight  - Monitor hemodynamic status.   - Goal MAP >80, SBP <160   - Possible liberalization of MAP goal to > 75, follow up with Vascular  - Resume PTA statin, ASA, metoprolol  - Hold PTA amlodipine     GI care / Nutrition:   # At risk for protein malnutrition  # Hx fatty liver  - Nutrition consulted, appreciate recommendations  - NPO for procedure, change to regular diet post bronch decision  - PPI not indicated  - Bowel regimen: scheduled MiraLAX, senna     Renal / Fluids / Electrolytes:   # Hypervolemia  Presenting creat 1.06. Baseline appears to be approx 0.8 - 1.0  - Strict I/O, daily weights  - Avoid/limit nephrotoxins as able.  - UOP goal: euvolemia   - CMP daily  - Replete lytes PRN per protocol     Endocrine:    # Diabetes Mellitus, type 2  A1c 6.4 on 10/15  - Insulin subcutaneous carb coverage ordered.   - q4 hour monitoring while NPO  - Goal BG <180 for optimal healing  - Hypoglycemia protocol     ID / Antibiotics:  # Covid + 10/14/2024  WBC 8.9 from 7.1, Tmax 99.3, no other s/s infection  - Presented with URI for several weeks. WBC normal on presentation. Does not meet criteria for treatment of covid.   - Continue to monitor fever curve, WBC, and inflammatory markers as appropriate  - Completed perioperative prophylaxis.      Heme:     # Hemoptysis  # Normocytic anemia  Hgb at 10.5 from 11.1  - Continue to monitor  - CBC daily  - SubQ heparin    MSK / Skin:  # Procedural sites  # Gout  Ambulatory on admission  - PT/OT if indicated  - Right groin access for TEVAR soft, C/D/I  - Left wrist site hemostatic, with warm distal perfusion and intact CMS.   - PTA allopurinol     Lines/ tubes/ drains:  - Arterial line  - PIV x3      Prophylaxis:  - DVT Prophylaxis: Heparin SQ  - PUD Prophylaxis: n/a    Code Status: Full Code      Disposition: ICU, transfer to floor if no bronch indicated or  "transfer after bronch and stable    The patient's care was discussed with the SICU attending and Fellow  Critical care time exclusive of procedures: 37 minutes    TISH Tony CNP     Clinically Significant Risk Factors           # Hypocalcemia: Lowest Ca = 8.4 mg/dL in last 2 days, will monitor and replace as appropriate     # Hypoalbuminemia: Lowest albumin = 3.2 g/dL at 10/15/2024 12:36 AM, will monitor as appropriate    # Coagulation Defect: INR = 1.25 (Ref range: 0.85 - 1.15) and/or PTT = 41 Seconds (Ref range: 22 - 38 Seconds), will monitor for bleeding    # Hypertension: Noted on problem list           # Obesity: Estimated body mass index is 35.55 kg/m  as calculated from the following:    Height as of this encounter: 1.753 m (5' 9\").    Weight as of this encounter: 109.2 kg (240 lb 11.9 oz)., PRESENT ON ADMISSION                 ______________________________________________________________________    Interval History   Utilized x2 doses of PRN meds to meet SBP goal overnight.  Pt states mild back pain this morning and feeling \"bloated\". Denies CP, SOB, numbness/tingling.    Physical Exam     Vital signs:  Temp: 98.8  F (37.1  C) Temp src: Oral BP: 100/67 Pulse: 76   Resp: 18 SpO2: 95 % O2 Device: BiPAP/CPAP Oxygen Delivery: 1 LPM Height: 175.3 cm (5' 9\") Weight: 109.2 kg (240 lb 11.9 oz)  Estimated body mass index is 35.55 kg/m  as calculated from the following:    Height as of this encounter: 1.753 m (5' 9\").    Weight as of this encounter: 109.2 kg (240 lb 11.9 oz).      Intake/Output:  Intake/Output Summary (Last 24 hours) at 10/16/2024 0642  Last data filed at 10/16/2024 0400  Gross per 24 hour   Intake 748 ml   Output 740 ml   Net 8 ml     Gen: NAD, laying comfortably in bed  Neuro: A&Ox 3, [place]- 5/5 strength in all extremities. Follows commands PERRLA  CV: RRR  Resp:  LS clear with left side notably more diminished  Abd: Soft, mildly distended, non-tender  Groins: soft bilaterally. No " hematoma.  Ext: +2 pulses throughout.          Data   I reviewed all medications, new labs and imaging results over the last 24 hours.  Arterial Blood Gases   Recent Labs   Lab 10/15/24  0506 10/15/24  0036 10/14/24  2250 10/14/24  2156   PH 7.41 7.43 7.48* 7.43   PCO2 48* 43 37 43   PO2 86 69* 74* 78*   HCO3 30* 29* 28 28       Complete Blood Count   Recent Labs   Lab 10/16/24  0355 10/15/24  0353 10/15/24  0036 10/14/24  2250 10/14/24  1837 10/14/24  1328   WBC 8.9 7.1 8.9  --   --  7.1   HGB 10.5* 11.1* 11.5* 11.4*   < > 13.9    156 167  --   --  221    < > = values in this interval not displayed.       Basic Metabolic Panel  Recent Labs   Lab 10/16/24  0355 10/16/24  0007 10/15/24  1641 10/15/24  1215 10/15/24  0618 10/15/24  0353 10/15/24  0042 10/15/24  0036 10/14/24  2250 10/14/24  2156 10/14/24  1837     --   --   --   --  141  --  141 143   < > 140   POTASSIUM 3.5  --   --   --   --  3.7  --  4.1 3.5   < > 3.8   CHLORIDE 104  --   --   --   --  105  --  106  --   --  102   CO2 27  --   --   --   --  26  --  24  --   --  28   BUN 10.3  --   --   --   --  12.5  --  12.7  --   --  11.2   CR 0.81  --   --   --   --  0.98  --  1.06  --   --  1.06   * 113* 100* 106*   < > 135*   < > 150* 123*   < > 100*    < > = values in this interval not displayed.       Liver Function Tests  Recent Labs   Lab 10/16/24  0355 10/15/24  0353 10/15/24  0036 10/14/24  1837   AST 16 14 16 17   ALT 15 17 12 22   ALKPHOS 70 79 77 89   BILITOTAL 0.6 0.7 0.8 0.8   ALBUMIN 3.3* 3.4* 3.2* 3.7   INR  --   --  1.25* 1.09       Pancreatic Enzymes  No lab results found in last 7 days.    Coagulation Profile  Recent Labs   Lab 10/15/24  0036 10/14/24  1837   INR 1.25* 1.09   PTT 41* 31       IMAGING:  Recent Results (from the past 24 hour(s))   XR Chest Port 1 View    Narrative    Portable chest 10/15/2024 at 1309 hours    INDICATION: Chest pain post TEVAR    Comparisons: 10/15/2024 0048 hours    Findings: TEVAR grossly  unchanged from earlier this morning. Left  costophrenic angle is again obscured with density throughout the left  lower hemithorax consistent with effusion and atelectasis. Right lung  appears relatively unremarkable. Median sternotomy again noted. No  evidence of pneumothorax.    IMPRESSION Continued left pleural effusion with left lung atelectasis  in this patient with TEVAR.    ALEYDA LYNN MD         SYSTEM ID:  [id number]

## 2024-10-17 ENCOUNTER — APPOINTMENT (OUTPATIENT)
Dept: OCCUPATIONAL THERAPY | Facility: CLINIC | Age: 54
End: 2024-10-17
Attending: THORACIC SURGERY (CARDIOTHORACIC VASCULAR SURGERY)
Payer: COMMERCIAL

## 2024-10-17 LAB
ALBUMIN SERPL BCG-MCNC: 3.2 G/DL (ref 3.5–5.2)
ALP SERPL-CCNC: 68 U/L (ref 40–150)
ALT SERPL W P-5'-P-CCNC: 12 U/L (ref 0–70)
ANION GAP SERPL CALCULATED.3IONS-SCNC: 9 MMOL/L (ref 7–15)
AST SERPL W P-5'-P-CCNC: 18 U/L (ref 0–45)
BILIRUB SERPL-MCNC: 0.8 MG/DL
BUN SERPL-MCNC: 10.6 MG/DL (ref 6–20)
CALCIUM SERPL-MCNC: 8.7 MG/DL (ref 8.8–10.4)
CHLORIDE SERPL-SCNC: 103 MMOL/L (ref 98–107)
CREAT SERPL-MCNC: 0.82 MG/DL (ref 0.67–1.17)
EGFRCR SERPLBLD CKD-EPI 2021: >90 ML/MIN/1.73M2
ERYTHROCYTE [DISTWIDTH] IN BLOOD BY AUTOMATED COUNT: 13.8 % (ref 10–15)
GLUCOSE SERPL-MCNC: 104 MG/DL (ref 70–99)
HCO3 SERPL-SCNC: 25 MMOL/L (ref 22–29)
HCT VFR BLD AUTO: 31.4 % (ref 40–53)
HGB BLD-MCNC: 10.1 G/DL (ref 13.3–17.7)
MAGNESIUM SERPL-MCNC: 2 MG/DL (ref 1.7–2.3)
MCH RBC QN AUTO: 28.2 PG (ref 26.5–33)
MCHC RBC AUTO-ENTMCNC: 32.2 G/DL (ref 31.5–36.5)
MCV RBC AUTO: 88 FL (ref 78–100)
PHOSPHATE SERPL-MCNC: 2.8 MG/DL (ref 2.5–4.5)
PLATELET # BLD AUTO: 192 10E3/UL (ref 150–450)
POTASSIUM SERPL-SCNC: 3.8 MMOL/L (ref 3.4–5.3)
PROT SERPL-MCNC: 6.7 G/DL (ref 6.4–8.3)
RBC # BLD AUTO: 3.58 10E6/UL (ref 4.4–5.9)
SODIUM SERPL-SCNC: 137 MMOL/L (ref 135–145)
WBC # BLD AUTO: 8.6 10E3/UL (ref 4–11)

## 2024-10-17 PROCEDURE — 250N000013 HC RX MED GY IP 250 OP 250 PS 637: Performed by: STUDENT IN AN ORGANIZED HEALTH CARE EDUCATION/TRAINING PROGRAM

## 2024-10-17 PROCEDURE — 250N000011 HC RX IP 250 OP 636: Performed by: NURSE PRACTITIONER

## 2024-10-17 PROCEDURE — 120N000003 HC R&B IMCU UMMC

## 2024-10-17 PROCEDURE — 80053 COMPREHEN METABOLIC PANEL: CPT

## 2024-10-17 PROCEDURE — 250N000013 HC RX MED GY IP 250 OP 250 PS 637

## 2024-10-17 PROCEDURE — 250N000013 HC RX MED GY IP 250 OP 250 PS 637: Performed by: SURGERY

## 2024-10-17 PROCEDURE — 97530 THERAPEUTIC ACTIVITIES: CPT | Mod: GO

## 2024-10-17 PROCEDURE — 250N000011 HC RX IP 250 OP 636: Performed by: STUDENT IN AN ORGANIZED HEALTH CARE EDUCATION/TRAINING PROGRAM

## 2024-10-17 PROCEDURE — 83735 ASSAY OF MAGNESIUM: CPT

## 2024-10-17 PROCEDURE — 36415 COLL VENOUS BLD VENIPUNCTURE: CPT

## 2024-10-17 PROCEDURE — 97110 THERAPEUTIC EXERCISES: CPT | Mod: GO

## 2024-10-17 PROCEDURE — 84100 ASSAY OF PHOSPHORUS: CPT

## 2024-10-17 PROCEDURE — 85027 COMPLETE CBC AUTOMATED: CPT

## 2024-10-17 PROCEDURE — 250N000013 HC RX MED GY IP 250 OP 250 PS 637: Performed by: NURSE PRACTITIONER

## 2024-10-17 PROCEDURE — 999N000157 HC STATISTIC RCP TIME EA 10 MIN

## 2024-10-17 RX ORDER — MAGNESIUM OXIDE 400 MG/1
400 TABLET ORAL EVERY 4 HOURS
Status: COMPLETED | OUTPATIENT
Start: 2024-10-17 | End: 2024-10-17

## 2024-10-17 RX ORDER — AMLODIPINE BESYLATE 5 MG/1
5 TABLET ORAL EVERY EVENING
Status: DISCONTINUED | OUTPATIENT
Start: 2024-10-17 | End: 2024-10-17

## 2024-10-17 RX ORDER — FUROSEMIDE 10 MG/ML
10 INJECTION INTRAMUSCULAR; INTRAVENOUS ONCE
Status: COMPLETED | OUTPATIENT
Start: 2024-10-17 | End: 2024-10-17

## 2024-10-17 RX ORDER — AMLODIPINE BESYLATE 10 MG/1
10 TABLET ORAL EVERY EVENING
Status: DISCONTINUED | OUTPATIENT
Start: 2024-10-17 | End: 2024-10-18 | Stop reason: HOSPADM

## 2024-10-17 RX ADMIN — Medication 5 MG: at 20:25

## 2024-10-17 RX ADMIN — ACETAMINOPHEN 975 MG: 325 TABLET, FILM COATED ORAL at 14:15

## 2024-10-17 RX ADMIN — METOPROLOL TARTRATE 100 MG: 50 TABLET, FILM COATED ORAL at 08:46

## 2024-10-17 RX ADMIN — FUROSEMIDE 10 MG: 10 INJECTION, SOLUTION INTRAMUSCULAR; INTRAVENOUS at 08:47

## 2024-10-17 RX ADMIN — HEPARIN SODIUM 5000 UNITS: 5000 INJECTION, SOLUTION INTRAVENOUS; SUBCUTANEOUS at 06:17

## 2024-10-17 RX ADMIN — MAGNESIUM OXIDE TAB 400 MG (241.3 MG ELEMENTAL MG) 400 MG: 400 (241.3 MG) TAB at 08:47

## 2024-10-17 RX ADMIN — METOPROLOL TARTRATE 100 MG: 50 TABLET, FILM COATED ORAL at 20:25

## 2024-10-17 RX ADMIN — SENNOSIDES 1 TABLET: 8.6 TABLET, FILM COATED ORAL at 08:46

## 2024-10-17 RX ADMIN — ACETAMINOPHEN 975 MG: 325 TABLET, FILM COATED ORAL at 01:35

## 2024-10-17 RX ADMIN — MAGNESIUM OXIDE TAB 400 MG (241.3 MG ELEMENTAL MG) 400 MG: 400 (241.3 MG) TAB at 11:21

## 2024-10-17 RX ADMIN — LIDOCAINE 1 PATCH: 4 PATCH TOPICAL at 08:50

## 2024-10-17 RX ADMIN — ACETAMINOPHEN 975 MG: 325 TABLET, FILM COATED ORAL at 20:25

## 2024-10-17 RX ADMIN — POLYETHYLENE GLYCOL 3350 17 G: 17 POWDER, FOR SOLUTION ORAL at 08:47

## 2024-10-17 RX ADMIN — Medication 1 TABLET: at 08:47

## 2024-10-17 RX ADMIN — HEPARIN SODIUM 5000 UNITS: 5000 INJECTION, SOLUTION INTRAVENOUS; SUBCUTANEOUS at 20:26

## 2024-10-17 RX ADMIN — ALLOPURINOL 100 MG: 100 TABLET ORAL at 08:46

## 2024-10-17 RX ADMIN — ATORVASTATIN CALCIUM 40 MG: 40 TABLET, FILM COATED ORAL at 20:25

## 2024-10-17 RX ADMIN — AMLODIPINE BESYLATE 10 MG: 10 TABLET ORAL at 20:26

## 2024-10-17 RX ADMIN — SENNOSIDES 1 TABLET: 8.6 TABLET, FILM COATED ORAL at 20:26

## 2024-10-17 RX ADMIN — HEPARIN SODIUM 5000 UNITS: 5000 INJECTION, SOLUTION INTRAVENOUS; SUBCUTANEOUS at 14:16

## 2024-10-17 RX ADMIN — ASPIRIN 81 MG CHEWABLE TABLET 81 MG: 81 TABLET CHEWABLE at 08:46

## 2024-10-17 RX ADMIN — ACETAMINOPHEN 975 MG: 325 TABLET, FILM COATED ORAL at 08:46

## 2024-10-17 ASSESSMENT — ACTIVITIES OF DAILY LIVING (ADL)
ADLS_ACUITY_SCORE: 37
ADLS_ACUITY_SCORE: 37
ADLS_ACUITY_SCORE: 22
ADLS_ACUITY_SCORE: 37
ADLS_ACUITY_SCORE: 23
ADLS_ACUITY_SCORE: 37
ADLS_ACUITY_SCORE: 22
DEPENDENT_IADLS:: INDEPENDENT
ADLS_ACUITY_SCORE: 22
ADLS_ACUITY_SCORE: 37
ADLS_ACUITY_SCORE: 22
ADLS_ACUITY_SCORE: 37
ADLS_ACUITY_SCORE: 22
ADLS_ACUITY_SCORE: 23
ADLS_ACUITY_SCORE: 22
ADLS_ACUITY_SCORE: 22

## 2024-10-17 NOTE — PROGRESS NOTES
Transfer  Transferred from: 4A  Via:bed  Reason for transfer: Pt appropriate for 6B- improved patient condition  Family: Aware of transfer  Belongings: Received with pt  Chart: Received with pt  Medications: Meds received from old unit with pt  Code Status verified on armband: yes  2 RN Skin Assessment Completed By: Angelique RN & Haley RN  Suction/Ambu bag/Flowmeter at bedside: yes    Report received from Shalini  Pt status: A&Ox4, can make needs known. CMS WDL strong pulses. R lung clear/diminished, L lung sounds barely perceptible. Regular diet. Voiding w/ urinal.     Continue to monitor and follow POC, notify provider of changes.

## 2024-10-17 NOTE — PLAN OF CARE
Problem: Adult Inpatient Plan of Care  Goal: Plan of Care Review  Description: The Plan of Care Review/Shift note should be completed every shift.  The Outcome Evaluation is a brief statement about your assessment that the patient is improving, declining, or no change.  This information will be displayed automatically on your shift  note.  Flowsheets (Taken 10/17/2024 1045)  Outcome Evaluation: Assement completed.  Plan to discharge home with no further needs from case management.  Goal: Readiness for Transition of Care  Recent Flowsheet Documentation  Taken 10/17/2024 1000 by Tammy Lee RN  Transportation Anticipated: family or friend will provide  Concerns to be Addressed: all concerns addressed in this encounter  Intervention: Mutually Develop Transition Plan  Recent Flowsheet Documentation  Taken 10/17/2024 1000 by Tammy Lee RN  Readmission Within the Last 30 Days: no previous admission in last 30 days  Transportation Anticipated: family or friend will provide  Transportation Concerns: none  Concerns to be Addressed: all concerns addressed in this encounter  Patient/Family Anticipated Services at Transition: none  Patient/Family Anticipates Transition to: home  Equipment Currently Used at Home: none
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
## 2024-10-17 NOTE — PROGRESS NOTES
CVTS Progress Note:  Yadiel Roth is a 54 year old male with PMH of thoracic ascending aortic aneurysm s/p repair 6/2019 with Dr. Mosquera c/b postop left vocal cord paralysis, migraines, HTN, dyslipidemia, gout, and DM2. Presented to [facility] with c/o hemoptysis and URI for several days. Found to be COVID +. CT scan obtained for PE eval, found significant dilation of the mid and distal aortic arch measuring 9.1cm. He is now s/p TEVAR for contained ruptured descending thoracic aortic aneurysm with vascular surgery on 10/14/24, who is now primary.    - CVTS will sign off. Please do not hesitate to call or page with any questions or concerns.    Luz Olivo PA-C  Cardiothoracic Surgery  Pager [phone]    7:36 AM October 17, 2024

## 2024-10-17 NOTE — PROGRESS NOTES
"Vascular Surgery Progress Note    10/17/24   Length of Stay: 3 days    Brief HPI: Yadiel Roth is a 54 year old male with hx significant for total arch repair with traditional elephant trunk with Dr Mosquera in 6/2019, presented to OSH on 10/14/24 with hemoptysis and URI for several days. Diagnosed with COVID, but in the course of workup CTPE showed 9/1 cm dilatation of distal aortic arch; patient transferred to [facility] urgently due to c/f impending rupture.     Procedures:  10/14/24: TEVAR distal to subclavian artery (cTAG 40x200 mm), with distal extension graft (cTAG 45x150 mm)         Interval: No complaints this AM, asks appropriate questions. Denies chest/abdominal/back pain. Up to chair, neuro-intact with bounding and symmetric pedal pulses, resumed home amlodipine, doing well on home metoprolol.    Physical Exam:  BP (!) 145/76 (BP Location: Right arm)   Pulse 71   Temp 98.3  F (36.8  C) (Axillary)   Resp 16   Ht 1.753 m (5' 9\")   Wt 107.5 kg (236 lb 15.9 oz)   SpO2 100%   BMI 35.00 kg/m     Gen: NAD, AAOx3  CV: RRR by peripheral pulse  Resp: NLB on RA  Abd: Soft, NTND, non-peritonitic  Groins: soft bilaterally. No hematoma.  Ext: 2+ DP/PT bl. Non-palpable L radial pulse, but CR<2 and hands WWP; no hematoma.   Neuro: SILT and 5/5 strength in all extremities.     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 Ochao-Chuy 10/14. Doing well clinically, tolerating diet appropriately, the presence of a bronco aorta fistula is highly unlikely.     - Resumed home amlodipine, metoprolol.  - Regular diet  - Q2 neurovascular checks - must be able to flex at the hips bilaterally, assess sensory and motor function.  - MAP>70 and SBP<160 mmHg   - ASA, statin  - Standing weight 107.5 kg today, was 106.5 kg on admission  - Lasix 10mg IV x1 for gentle diuresing, responded well  - DVT ppx: SQH  - Encourage ambulation in the room given COVID precautions    Disposition: Pending discharge potentially within 1 to 2 days.    Staff: Dr. Ha Heller, CNP  Vascular Surgery  Pager: [phone]  [email or web address]  Send message or 10 digit call back number Securely via [employer] with the [employer] Web Console (learn more here)

## 2024-10-17 NOTE — PLAN OF CARE
Hours of Care: 2040-0730    Neuro: A&Ox4. Can make needs known.   Cardiac: SR/SB HR 50-70's. VSS. MAP goal >70, SBP <160. Afebrile.  Respiratory: Sating >92% on RA while awake and on CPAP while sleeping. R lung sounds diminished, L lung sounds barely perceptible.   GI/: Adequate urine output via urinal bedside. No BM during shift.   Diet/appetite: Tolerating regular diet.   Activity:  SBA/independent in room.  Pain: At acceptable level on current regimen. Denies during shift.   Skin: No new deficits noted. Scattered bruising. R groin and L wrist site WDL, CMS intact and pulses strong.    LDA's: R PIV x2 SL     Plan: Continue to monitor and follow POC. Notify primary team with changes.    Goal Outcome Evaluation:      Plan of Care Reviewed With: patient  Overall Patient Progress: improvingOverall Patient Progress: improving     Problem: Cardiac Catheterization (Diagnostic/Interventional)  Goal: Absence of Bleeding  Outcome: Progressing     Problem: Cardiac Catheterization (Diagnostic/Interventional)  Goal: Absence of Vascular Access Complication  Outcome: Progressing

## 2024-10-17 NOTE — PLAN OF CARE
Neuro: A&Ox4.   Cardiac: SR on tele.  Denies chest pain.   Respiratory: Sating >92% on RA.  GI/: Lasix given one time per order, adequate urine output. No BM since 10/13, passing gas, refused to take any additional laxatives, voiced that he has not been eating much.  Positive bowel sounds x 4 quad.  Diet/appetite: Tolerating regular diet. Needs encouragement during meal time.  Activity:  Independent to SBA, up to chair and walking inside room.  Pain: At acceptable level on current regimen.   Skin: No new deficits noted.  Right groin site WNL.  Left radial site WNL.  LDA's:  Right PIV x 2, saline lock.    Mg replaced today, all electrolyte protocols recheck tomorrow.    Plan: Continue with POC. Notify primary team with changes.

## 2024-10-18 ENCOUNTER — PATIENT OUTREACH (OUTPATIENT)
Dept: ONCOLOGY | Facility: CLINIC | Age: 54
End: 2024-10-18

## 2024-10-18 ENCOUNTER — APPOINTMENT (OUTPATIENT)
Dept: CT IMAGING | Facility: CLINIC | Age: 54
End: 2024-10-18
Attending: STUDENT IN AN ORGANIZED HEALTH CARE EDUCATION/TRAINING PROGRAM
Payer: COMMERCIAL

## 2024-10-18 ENCOUNTER — APPOINTMENT (OUTPATIENT)
Dept: GENERAL RADIOLOGY | Facility: CLINIC | Age: 54
End: 2024-10-18
Attending: NURSE PRACTITIONER
Payer: COMMERCIAL

## 2024-10-18 VITALS
BODY MASS INDEX: 35.07 KG/M2 | HEIGHT: 69 IN | RESPIRATION RATE: 20 BRPM | WEIGHT: 236.77 LBS | TEMPERATURE: 98.5 F | DIASTOLIC BLOOD PRESSURE: 50 MMHG | HEART RATE: 72 BPM | SYSTOLIC BLOOD PRESSURE: 108 MMHG | OXYGEN SATURATION: 97 %

## 2024-10-18 DIAGNOSIS — Z86.79 S/P AORTIC ANEURYSM REPAIR: Primary | ICD-10-CM

## 2024-10-18 DIAGNOSIS — Z98.890 S/P AORTIC ANEURYSM REPAIR: Primary | ICD-10-CM

## 2024-10-18 DIAGNOSIS — I71.21 ANEURYSM OF ASCENDING AORTA WITHOUT RUPTURE (H): ICD-10-CM

## 2024-10-18 DIAGNOSIS — I71.23 ANEURYSM OF DESCENDING THORACIC AORTA WITHOUT RUPTURE (H): ICD-10-CM

## 2024-10-18 LAB
ALBUMIN SERPL BCG-MCNC: 3.2 G/DL (ref 3.5–5.2)
ALP SERPL-CCNC: 65 U/L (ref 40–150)
ALT SERPL W P-5'-P-CCNC: 15 U/L (ref 0–70)
ANION GAP SERPL CALCULATED.3IONS-SCNC: 9 MMOL/L (ref 7–15)
AST SERPL W P-5'-P-CCNC: 15 U/L (ref 0–45)
ATRIAL RATE - MUSE: 73 BPM
BILIRUB SERPL-MCNC: 0.7 MG/DL
BUN SERPL-MCNC: 12.2 MG/DL (ref 6–20)
CALCIUM SERPL-MCNC: 8.8 MG/DL (ref 8.8–10.4)
CHLORIDE SERPL-SCNC: 104 MMOL/L (ref 98–107)
CREAT SERPL-MCNC: 0.86 MG/DL (ref 0.67–1.17)
DIASTOLIC BLOOD PRESSURE - MUSE: NORMAL MMHG
EGFRCR SERPLBLD CKD-EPI 2021: >90 ML/MIN/1.73M2
ERYTHROCYTE [DISTWIDTH] IN BLOOD BY AUTOMATED COUNT: 13.8 % (ref 10–15)
GLUCOSE SERPL-MCNC: 96 MG/DL (ref 70–99)
HCO3 SERPL-SCNC: 26 MMOL/L (ref 22–29)
HCT VFR BLD AUTO: 30.3 % (ref 40–53)
HGB BLD-MCNC: 9.7 G/DL (ref 13.3–17.7)
INTERPRETATION ECG - MUSE: NORMAL
MAGNESIUM SERPL-MCNC: 2 MG/DL (ref 1.7–2.3)
MCH RBC QN AUTO: 28.1 PG (ref 26.5–33)
MCHC RBC AUTO-ENTMCNC: 32 G/DL (ref 31.5–36.5)
MCV RBC AUTO: 88 FL (ref 78–100)
P AXIS - MUSE: 55 DEGREES
PHOSPHATE SERPL-MCNC: 3.2 MG/DL (ref 2.5–4.5)
PLATELET # BLD AUTO: 220 10E3/UL (ref 150–450)
POTASSIUM SERPL-SCNC: 3.8 MMOL/L (ref 3.4–5.3)
PR INTERVAL - MUSE: 162 MS
PROT SERPL-MCNC: 6.7 G/DL (ref 6.4–8.3)
QRS DURATION - MUSE: 88 MS
QT - MUSE: 416 MS
QTC - MUSE: 458 MS
R AXIS - MUSE: 14 DEGREES
RBC # BLD AUTO: 3.45 10E6/UL (ref 4.4–5.9)
SODIUM SERPL-SCNC: 139 MMOL/L (ref 135–145)
SYSTOLIC BLOOD PRESSURE - MUSE: NORMAL MMHG
T AXIS - MUSE: 100 DEGREES
VENTRICULAR RATE- MUSE: 73 BPM
WBC # BLD AUTO: 9.2 10E3/UL (ref 4–11)

## 2024-10-18 PROCEDURE — 99231 SBSQ HOSP IP/OBS SF/LOW 25: CPT | Performed by: NURSE PRACTITIONER

## 2024-10-18 PROCEDURE — 250N000011 HC RX IP 250 OP 636: Performed by: SURGERY

## 2024-10-18 PROCEDURE — 250N000011 HC RX IP 250 OP 636: Performed by: STUDENT IN AN ORGANIZED HEALTH CARE EDUCATION/TRAINING PROGRAM

## 2024-10-18 PROCEDURE — 74174 CTA ABD&PLVS W/CONTRAST: CPT

## 2024-10-18 PROCEDURE — 99223 1ST HOSP IP/OBS HIGH 75: CPT | Mod: 24 | Performed by: INTERNAL MEDICINE

## 2024-10-18 PROCEDURE — 71046 X-RAY EXAM CHEST 2 VIEWS: CPT

## 2024-10-18 PROCEDURE — G0008 ADMIN INFLUENZA VIRUS VAC: HCPCS | Performed by: SURGERY

## 2024-10-18 PROCEDURE — 74174 CTA ABD&PLVS W/CONTRAST: CPT | Mod: 26 | Performed by: RADIOLOGY

## 2024-10-18 PROCEDURE — 71046 X-RAY EXAM CHEST 2 VIEWS: CPT | Mod: 26 | Performed by: RADIOLOGY

## 2024-10-18 PROCEDURE — 71275 CT ANGIOGRAPHY CHEST: CPT

## 2024-10-18 PROCEDURE — 36415 COLL VENOUS BLD VENIPUNCTURE: CPT

## 2024-10-18 PROCEDURE — 250N000013 HC RX MED GY IP 250 OP 250 PS 637: Performed by: NURSE PRACTITIONER

## 2024-10-18 PROCEDURE — 250N000013 HC RX MED GY IP 250 OP 250 PS 637: Performed by: STUDENT IN AN ORGANIZED HEALTH CARE EDUCATION/TRAINING PROGRAM

## 2024-10-18 PROCEDURE — 250N000013 HC RX MED GY IP 250 OP 250 PS 637

## 2024-10-18 PROCEDURE — 85049 AUTOMATED PLATELET COUNT: CPT

## 2024-10-18 PROCEDURE — 84100 ASSAY OF PHOSPHORUS: CPT

## 2024-10-18 PROCEDURE — 250N000013 HC RX MED GY IP 250 OP 250 PS 637: Performed by: SURGERY

## 2024-10-18 PROCEDURE — 85014 HEMATOCRIT: CPT

## 2024-10-18 PROCEDURE — 80053 COMPREHEN METABOLIC PANEL: CPT

## 2024-10-18 PROCEDURE — 999N000248 HC STATISTIC IV INSERT WITH US BY RN

## 2024-10-18 PROCEDURE — 83735 ASSAY OF MAGNESIUM: CPT

## 2024-10-18 PROCEDURE — 71275 CT ANGIOGRAPHY CHEST: CPT | Mod: 26 | Performed by: RADIOLOGY

## 2024-10-18 PROCEDURE — 90673 RIV3 VACCINE NO PRESERV IM: CPT | Performed by: SURGERY

## 2024-10-18 RX ORDER — IOPAMIDOL 755 MG/ML
90 INJECTION, SOLUTION INTRAVASCULAR ONCE
Status: COMPLETED | OUTPATIENT
Start: 2024-10-18 | End: 2024-10-18

## 2024-10-18 RX ORDER — SENNOSIDES 8.6 MG
1 TABLET ORAL 2 TIMES DAILY PRN
Qty: 28 TABLET | Refills: 0 | Status: SHIPPED | OUTPATIENT
Start: 2024-10-18 | End: 2024-11-01

## 2024-10-18 RX ORDER — METHOCARBAMOL 500 MG/1
750 TABLET, FILM COATED ORAL 3 TIMES DAILY PRN
Qty: 84 TABLET | Refills: 0 | Status: SHIPPED | OUTPATIENT
Start: 2024-10-18 | End: 2024-11-06

## 2024-10-18 RX ORDER — MAGNESIUM OXIDE 400 MG/1
400 TABLET ORAL EVERY 4 HOURS
Status: COMPLETED | OUTPATIENT
Start: 2024-10-18 | End: 2024-10-18

## 2024-10-18 RX ORDER — POLYETHYLENE GLYCOL 3350 17 G/17G
17 POWDER, FOR SOLUTION ORAL DAILY PRN
Qty: 238 G | Refills: 0 | Status: SHIPPED | OUTPATIENT
Start: 2024-10-18 | End: 2024-11-01

## 2024-10-18 RX ADMIN — ASPIRIN 81 MG CHEWABLE TABLET 81 MG: 81 TABLET CHEWABLE at 08:51

## 2024-10-18 RX ADMIN — HEPARIN SODIUM 5000 UNITS: 5000 INJECTION, SOLUTION INTRAVENOUS; SUBCUTANEOUS at 05:01

## 2024-10-18 RX ADMIN — MAGNESIUM OXIDE TAB 400 MG (241.3 MG ELEMENTAL MG) 400 MG: 400 (241.3 MG) TAB at 06:30

## 2024-10-18 RX ADMIN — MAGNESIUM OXIDE TAB 400 MG (241.3 MG ELEMENTAL MG) 400 MG: 400 (241.3 MG) TAB at 10:44

## 2024-10-18 RX ADMIN — SENNOSIDES 1 TABLET: 8.6 TABLET, FILM COATED ORAL at 08:50

## 2024-10-18 RX ADMIN — ACETAMINOPHEN 975 MG: 325 TABLET, FILM COATED ORAL at 08:50

## 2024-10-18 RX ADMIN — LIDOCAINE 1 PATCH: 4 PATCH TOPICAL at 08:51

## 2024-10-18 RX ADMIN — ACETAMINOPHEN 975 MG: 325 TABLET, FILM COATED ORAL at 01:43

## 2024-10-18 RX ADMIN — Medication 1 TABLET: at 08:50

## 2024-10-18 RX ADMIN — INFLUENZA A VIRUS A/WEST VIRGINIA/30/2022 (H1N1) RECOMBINANT HEMAGGLUTININ ANTIGEN, INFLUENZA A VIRUS A/MASSACHUSETTS/18/2022 (H3N2) RECOMBINANT HEMAGGLUTININ ANTIGEN, AND INFLUENZA B VIRUS B/AUSTRIA/1359417/2021 RECOMBINANT HEMAGGLUTININ ANTIGEN 0.5 ML: 45; 45; 45 INJECTION INTRAMUSCULAR at 10:44

## 2024-10-18 RX ADMIN — METOPROLOL TARTRATE 100 MG: 50 TABLET, FILM COATED ORAL at 08:50

## 2024-10-18 RX ADMIN — POLYETHYLENE GLYCOL 3350 17 G: 17 POWDER, FOR SOLUTION ORAL at 08:51

## 2024-10-18 RX ADMIN — ALLOPURINOL 100 MG: 100 TABLET ORAL at 08:51

## 2024-10-18 RX ADMIN — IOPAMIDOL 90 ML: 755 INJECTION, SOLUTION INTRAVENOUS at 10:22

## 2024-10-18 RX ADMIN — HEPARIN SODIUM 5000 UNITS: 5000 INJECTION, SOLUTION INTRAVENOUS; SUBCUTANEOUS at 14:52

## 2024-10-18 RX ADMIN — ACETAMINOPHEN 975 MG: 325 TABLET, FILM COATED ORAL at 14:52

## 2024-10-18 ASSESSMENT — ACTIVITIES OF DAILY LIVING (ADL)
ADLS_ACUITY_SCORE: 22
ADLS_ACUITY_SCORE: 23
ADLS_ACUITY_SCORE: 22

## 2024-10-18 NOTE — PROGRESS NOTES
Per CPT order pt has pending discharge; RT instructed pt on how to use incentive spirometer and flutter valve correctly. If pt doesn't discharge, we can look more into other ways to provide lung expansion. Otherwise, pt is on room air vital sign stable and BS clear/diminished.     Rashmi Moreno, RT on 10/18/2024 at 1:45 PM

## 2024-10-18 NOTE — PLAN OF CARE
Goal Outcome Evaluation:    Shift 5253-5579.       Neuro: A&Ox4.   Cardiac: SR. VSS.   Respiratory: Sating >92% on RA and CPAP @ 21% while asleep.  GI/: Poor PO intake, was -790 for 10/17.No BM this shift.  Diet/appetite: On reg diet.   Activity:  SBA to independent while in the room.  Pain: At acceptable level on current regimen.   Skin: No new deficits noted. R groin and L radial site WNL.  LDA's: R PIV X2 SL.    Plan: Continue with POC. Notify primary team with changes.    Provider notification:      Sent to [facility] at 5AM. --- NO RESPONSE - Latest BP at 0630 138/68 (88)     [facility] [place] [facility] [place]  Does 10/15 VS parameters still apply? Says to report SBP <120, and MAP <80. Latest values are 116/54, MAP 71. Pt asymptomatic.     Keon RN  [phone]

## 2024-10-18 NOTE — PROGRESS NOTES
New IP (Interventional Pulmonology) referral rec'd.  Chart reviewed.       New Patient: Interventional Pulmonary (Lung nodule) Nurse Navigator Note    Referring provider: Rosalba Hutchinson MDUu [address]    Referred to (specialty): Interventional Pulmonary (Lung nodule)    Requested provider (if applicable): n/a    Date Referral Received: 10/18/2024    Evaluation for:  pt w/ tracheal deviation and LLL obstruction due to external compression from aortic aneurysm. Question any possible intervention for LLL narrowing    Clinical History (per Nurse review of records provided):    **BOOK MARKED**    EXAM: CT CHEST PULMONARY EMBOLISM W CONTRAST  LOCATION: [facility]  DATE: 10/14/2024     INDICATION: coughing up blood. History of ascending aortic aneurysm repair.  COMPARISON: Most recent 1/22/2020 and 7/25/2020  TECHNIQUE: CT chest pulmonary angiogram during arterial phase injection of IV contrast. Multiplanar reformats and MIP reconstructions were performed. Dose reduction techniques were used.   CONTRAST: Isovue 370 75mL     FINDINGS:  ANGIOGRAM CHEST: Negative for pulmonary emboli. Markedly abnormal aortic arch and descending thoracic aorta.     Postop change of previous aortic root repair. Significant degenerative aneurysmal dilatation of the mid and distal aortic arch just beyond the level of repair measures 9.1 cm. (Previously 5.4 cm in 2020). Degenerative aneurysmal dilatation of the upper   to mid descending thoracic aorta 6.3 cm. Both these sites are concerning for possible mildly with some stranding in the mediastinum around this level. Due to early phase of enhancement minimal contrast material makes it beyond the aortic arch. No CT   evidence of right heart strain.     LUNGS AND PLEURA: Atelectasis in the left lower lobe and infiltrates in the left lower lobe could be due to pneumonia from compressed bronchi versus blood products.  Right lung is clear.     MEDIASTINUM/AXILLAE: No free hemorrhage in the mediastinum.     CORONARY ARTERY CALCIFICATION: Mild.     UPPER ABDOMEN: Normal.     MUSCULOSKELETAL: Normal.                                                                      IMPRESSION:  1.  Postop repair of the ascending thoracic aorta.     2.  Since 7/25/2024 significant progression of the aneurysmal dilatation of the aortic arch 9.1 cm and at the level of the mid descending aorta 6.3 cm with changes concerning for possible mild/early rupture. Due to extremely slow flow early phase of   enhancement minimal contrast makes it beyond the aortic arch.     3.  Atelectasis left lower lobe and infiltrates in the left lower lobe could be due to blood products or possibly pneumonia.        [Critical Result: Marked progression aneurysmal dilatation thoracic aortic arch with changes concerning for possible mild rupture.]     Finding was identified on 10/14/2024 2:24 PM CDT.      Yara Camacho was contacted by me on 10/14/2024 2:45 PM CDT and verbalized understanding of the critical result.      Component       Records Location: Epic     Records Needed: none    Additional testing needed prior to consult: none

## 2024-10-18 NOTE — PLAN OF CARE
Occupational Therapy Discharge Summary    Reason for therapy discharge:    Discharged to home with outpatient therapy.    Progress towards therapy goal(s). See goals on Care Plan in [facility] electronic health record for goal details.  Goals partially met.  Barriers to achieving goals:   discharge from facility.    Therapy recommendation(s):    Follow up with OP [place] PRN following covid diagnosis
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
## 2024-10-18 NOTE — DISCHARGE INSTRUCTIONS
Discharge Instructions    Diet  - You may return to your regular diet. We always encourage taking fiber as well as staying well-hydrated.   - Be sure to drink plenty of fluids.     Activity  - No lifting, pushing, or pulling greater than 10 pounds and no strenuous exercise for 4-6 weeks.   - We encourage throat lozenges or cough drops if you develop a cough.  - No driving while on narcotic pain medications  - No driving until you are able to fully twist to both sides quickly and without any pain    Pulmonary Recommendations:  Discharge with incentive spirometer and flutter valve, instructed pt to use TID while getting over Covid19, then PRN with increased sputum/colds  Instructed patient of symptoms of pneumonia and informed him to seek care early on if he feels like he is developing pneumonia - he is at risk for post-obstructive PNA in the LLL and would benefit from early antibiotics   Discussed the potential for aorto-bronchial fistula with pt given his enlarged aorta and the potential catastrophic nature of such an event; he understands to call 911 should he develop significant hemoptysis   Referral to interventional pulmonary clinic (ordered for you in discharge navigator) to discuss any potential additional treatment options   Stay up to date with routine immunizations     Wound Care  - Inspect your groin wounds daily for signs of infection (increased redness, drainage, pain)  - Keep your wound clean and dry, inspect it daily for the above signs.  - Your incision was closed with skin glue, this will fall off on it's own. Do not remove this yourself until advised to do so.   - You may shower 24 hours after your surgery, but do not soak incisions in tubs, pools, lakes for at least 14 days post-surgery. When showering, please do not scrub your incisions or the skin glue - let soapy water run over them, pat to dry.    Call Vascular surgery [facility] for:  - Temperature > 101F, chills, rigors, dizziness  - Redness around  or purulent drainage from wound  - Inability to tolerate diet, nausea or vomiting  - You stop passing gas, develop significant bloating, abdominal pain  - Have blood in stools/vomit  - Have severe diarrhea/constipation  - Any other questions or concerns.    Medication Instructions  Some of your medications may have changed. Please take only prescribed and resumed medications.     We recommend you take Tylenol around the clock for baseline pain control (this was prescribed for you). Then take narcotic pain medication only as needed if you were prescribed any. Once you are no longer needing narcotics, you may take the Tylenol as needed. Do not take more than 4,000 mg of acetaminophen (Tylenol) from all sources to reduce the risk of liver damage.     Follow up:    PCP:  Follow up with PCP in 5-7 days for post-hospitalization follow up. You may call to set this up - most clinics will be able to get you an appointment within the week if you let them know you were recently hospitalized and need to see your PCP.     Vascular:  You should see Dr. Medina in the outpatient clinic in 4 weeks from your initial dissection episode for routine follow-up to check your progress. CTA scan of your chest, abdomen, pelvis should be done prior to seeing the doctor.     You should see Christie Heller CNP in the outpatient clinic within 2 weeks of your discharge.  No additional tests are necessary for that clinic visit. Our team will reach out to you to schedule these appointments.    With general vascular surgery questions, concerns, or to request/change an appointment, please call:      Vascular Call Center  [phone]    To contact someone after 5 pm, on a weekend, or on a Holiday, please call:  [facility]  [phone], option 4 to have the Attending Physician for Vascular Surgery Service paged.

## 2024-10-18 NOTE — PROGRESS NOTES
Dual RN groin site check completed with FIDEL Oquendo. Soft, non-tender, no bleeding or hematoma.     Antolin Ku RN on 10/17/2024 at 7:43 PM

## 2024-10-18 NOTE — PROVIDER NOTIFICATION
Sent to [facility].    6b [facility] Rm 29  Does 10/15 VS parameters still apply? Says to report SBP <120, and MAP <80. Latest values are 116/54, MAP 71. Pt asymptomatic.    FIDEL Herbert  [phone]

## 2024-10-18 NOTE — PROGRESS NOTES
DISCHARGE                   October 18th, 2024 03:40pm  ----------------------------------------------------------------------------  Discharged to: Home  Via: private transportation  Accompanied by: Family  Discharge Instructions: regular diet, limited activity (no lifting/pushing/pulling >10 lbs and no strenuous exercise for 4-6 weeks), medications, follow up appointments, when to call the MD, aftercare instructions.  Prescriptions: To be filled by discharge pharmacy; medication list reviewed & sent with pt  Follow Up Appointments: arranged; information given  Belongings: All sent with pt  IV: d/c'd  Telemetry: d/c'd  Pt exhibits understanding of above discharge instructions; all questions answered.    Discharge Paperwork: Signed, copied, and sent home with patient.

## 2024-10-18 NOTE — DISCHARGE SUMMARY
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 Mosquera and Dr. Preston 6/10/2019 with vocal cord paralysis subsequently lost to follow up  #5- Hypertension  #6- Hyperlipidemia  #7- Diabetes mellitus, type 2  #8- BMI 34  #9- Migraines  #10- Gout  #11- Fatty liver disease    PROCEDURES PERFORMED, 10/14/2024:   Endovascular repair of contained rupture of arch and proximal descending thoracic aortic aneurysm using 40x200 mm [place] cTAG thoracic stent-graft deployed in Zone  3-4 .  Distal thoracic extension using 45x150 mm [place] cTAG thoracic stent-graft deployed in Zone  4-5 .  Arch and thoracic angiography.  Intravascular ultrasound of the ascending, arch, and thoracoabdominal aorta.  Balloon dilatation of overlap, proximal and distal landing zones.  Total percutaneous right femoral approach with Perclose device, 24-Fr sheath on the right groin.  Left percutaneous radial access with placement of 5-Fr sheath on the left wrist with TR band closure    INTRAOPERATIVE FINDINGS:   Large contained distal arch and descending thoracic aortic aneurysm.   IVUS confirmation of cannulation of the elephant trunk and sizing  Radial-femoral through and through access. Patent innominate, left carotid and subclavian at beginning and completion of procedure.  Compression of elephant trunk by aneurysm and rupture improved after stenting and balloon dilation. No type I or III endoleaks at completion.    POSTOPERATIVE COMPLICATIONS: None     DATE OF DISCHARGE: 10/18/24    HOSPITAL COURSE: Yadiel Roth is a 54 year old male who on  10/14/2024 underwent the above-named procedures. He tolerated the procedure well and postoperatively was transferred to the ICU for close monitoring. His recovery has progressed appropriately and was subsequently transferred general post-surgical unit. Postoperatively he was found to have hemoptysis, in addition to left lung opacification and tracheal deviation.  Pulmonary team was consulted who noted that a bronchial aortic fistula is unlikely, usually a bronco-aortic fistula (because of the high pressure system) would be devastating however given patient's progress this is highly unlikely. His left lung continued to slowly improve, at discharge was provided an incentive spirometer and flutter valve with instructions to use 3 times per day while getting over COVID-19 then as needed with increased sputum/colds. He was also referred to interventional pulmonary clinic to discuss additional potential treatment options. The remainder of his course was essentiallly uncomplicated. Prior to discharge, his pain was controlled well with Tylenol and Robaxin.  He was able to perform ADLs and ambulate independently without difficulty, and had full return of bowel and bladder function.  On 10/18/2024, he was discharged to home in stable condition.    Physical Exam:  Constitutional: healthy, alert, no acute distress and cooperative   Cardiovascular: pulses regular  Respiratory: breathing unlabored without secondary muscle use  Psychiatric: mentation appears normal and affect normal/bright  Neck: no asymmetry  GI/Abdomen: abdomen soft, non-tender. No palpable masses  MSK: able to move all extremities without weakness or ataxia  Extremities: 2+ DP/PT bl. Non-palpable L radial pulse, but capillary refill <2 and hands WWP; no hematoma. Groins are soft bilaterally, without hematomas, incision well-approximated, Dermabond glue intact.    DISCHARGE INSTRUCTIONS:    Diet  - You may return to your regular diet. We always encourage taking  fiber as well as staying well-hydrated.   - Be sure to drink plenty of fluids.     Activity  - No lifting, pushing, or pulling greater than 10 pounds and no strenuous exercise for 4-6 weeks.   - We encourage throat lozenges or cough drops if you develop a cough.  - No driving while on narcotic pain medications  - No driving until you are able to fully twist to both sides quickly and without any pain    Pulmonary Recommendations:  Discharge with incentive spirometer and flutter valve, instructed pt to use TID while getting over Covid19, then PRN with increased sputum/colds  Instructed patient of symptoms of pneumonia and informed him to seek care early on if he feels like he is developing pneumonia - he is at risk for post-obstructive PNA in the LLL and would benefit from early antibiotics   Discussed the potential for aorto-bronchial fistula with pt given his enlarged aorta and the potential catastrophic nature of such an event; he understands to call 911 should he develop significant hemoptysis   Referral to interventional pulmonary clinic (ordered for you in discharge navigator) to discuss any potential additional treatment options   Stay up to date with routine immunizations     Wound Care  - Inspect your groin wounds daily for signs of infection (increased redness, drainage, pain)  - Keep your wound clean and dry, inspect it daily for the above signs.  - Your incision was closed with skin glue, this will fall off on it's own. Do not remove this yourself until advised to do so.   - You may shower 24 hours after your surgery, but do not soak incisions in tubs, pools, lakes for at least 14 days post-surgery. When showering, please do not scrub your incisions or the skin glue - let soapy water run over them, pat to dry.    Call Vascular surgery [facility] for:  - Temperature > 101F, chills, rigors, dizziness  - Redness around or purulent drainage from wound  - Inability to tolerate diet, nausea or vomiting  - You stop  passing gas, develop significant bloating, abdominal pain  - Have blood in stools/vomit  - Have severe diarrhea/constipation  - Any other questions or concerns.    Medication Instructions  Some of your medications may have changed. Please take only prescribed and resumed medications.     We recommend you take Tylenol around the clock for baseline pain control (this was prescribed for you). Then take narcotic pain medication only as needed if you were prescribed any. Once you are no longer needing narcotics, you may take the Tylenol as needed. Do not take more than 4,000 mg of acetaminophen (Tylenol) from all sources to reduce the risk of liver damage.     Follow up:    PCP:  Follow up with PCP in 5-7 days for post-hospitalization follow up. You may call to set this up - most clinics will be able to get you an appointment within the week if you let them know you were recently hospitalized and need to see your PCP.     Vascular:  You should see Dr. Medina in the outpatient clinic in 4 weeks from your initial dissection episode for routine follow-up to check your progress. CTA scan of your chest, abdomen, pelvis should be done prior to seeing the doctor.     You should see Christie Heller CNP in the outpatient clinic within 2 weeks of your discharge. No additional tests are necessary for that clinic visit. Our team will reach out to you to schedule these appointments.    With general vascular surgery questions, concerns, or to request/change an appointment, please call:      Vascular Call Center  [phone]    To contact someone after 5 pm, on a weekend, or on a Holiday, please call:  [facility]  [phone], option 4 to have the Attending Physician for Vascular Surgery Service paged.        DISCHARGE MEDICATIONS:     Review of your medicines        START taking        Dose / Directions   methocarbamol 500 MG tablet  Commonly known as: ROBAXIN  Used for: History of repair of thoracic  aortic aneurysm, Infection due to 2019 novel coronavirus, Ruptured aneurysm of ascending aorta (H)      Dose: 750 mg  Take 1.5 tablets (750 mg) by mouth 3 times daily as needed for muscle spasms.  Quantity: 84 tablet  Refills: 0     polyethylene glycol 17 GM/Dose powder  Commonly known as: MIRALAX  Used for: History of repair of thoracic aortic aneurysm, Infection due to 2019 novel coronavirus, Ruptured aneurysm of ascending aorta (H)      Dose: 17 g  Take 17 g by mouth daily as needed for constipation (Only take if constipated).  Quantity: 238 g  Refills: 0     sennosides 8.6 MG tablet  Commonly known as: SENOKOT  Used for: History of repair of thoracic aortic aneurysm, Infection due to 2019 novel coronavirus, Ruptured aneurysm of ascending aorta (H)      Dose: 1 tablet  Take 1 tablet by mouth or Feeding Tube 2 times daily as needed for constipation (only take if constipated).  Quantity: 28 tablet  Refills: 0            CONTINUE these medicines which have NOT CHANGED        Dose / Directions   acetaminophen 500 MG tablet  Commonly known as: TYLENOL      Dose: 500-1,000 mg  Take 500-1,000 mg by mouth every 6 hours as needed for pain.  Refills: 0     allopurinol 100 MG tablet  Commonly known as: ZYLOPRIM  Used for: Gout, unspecified cause, unspecified chronicity, unspecified site      Dose: 100 mg  Take 1 tablet (100 mg) by mouth daily  Quantity: 90 tablet  Refills: 0     amLODIPine 10 MG tablet  Commonly known as: NORVASC  Used for: Essential hypertension      Dose: 10 mg  Take 1 tablet (10 mg) by mouth every evening  Quantity: 90 tablet  Refills: 0     aspirin 81 MG chewable tablet  Commonly known as: ASA  Used for: S/P aortic aneurysm repair      Dose: 81 mg  Take 1 tablet (81 mg) by mouth daily  Quantity: 30 tablet  Refills: 0     atorvastatin 40 MG tablet  Commonly known as: LIPITOR  Used for: Mixed hyperlipidemia      Dose: 40 mg  Take 1 tablet (40 mg) by mouth at bedtime  Quantity: 90 tablet  Refills: 0      capsaicin 0.025 % external cream  Commonly known as: ZOSTRIX      Apply topically 3 times daily as needed (sore muscles/joints).  Refills: 0     docusate sodium 100 MG tablet  Commonly known as: COLACE  Used for: S/P aortic aneurysm repair      Dose: 100 mg  Take 1 tablet (100 mg) by mouth 2 times daily as needed for constipation  Refills: 0     metoprolol tartrate 100 MG tablet  Commonly known as: LOPRESSOR  Used for: Essential hypertension      Dose: 100 mg  Take 1 tablet (100 mg) by mouth 2 times daily  Quantity: 180 tablet  Refills: 0     MULTIVITAMIN PO      Dose: 1 tablet  Take 1 tablet by mouth daily.  Refills: 0               Where to get your medicines        These medications were sent to [facility] [place], [place] - [address] [place] [id number]      Phone: [phone]   methocarbamol 500 MG tablet  polyethylene glycol 17 GM/Dose powder  sennosides 8.6 MG tablet         Christie Bacu, CNP  Vascular Surgery  Pager: [phone]  [email or web address]  Send message or 10 digit call back number Securely via [employer] with the [employer] Web Console (learn more here)'

## 2024-10-18 NOTE — PROGRESS NOTES
"Vascular Surgery Progress Note    10/18/24   Length of Stay: 4 days    Brief HPI: Yadiel Roth is a 54 year old male with hx significant for total arch repair with traditional elephant trunk with Dr oMsquera in 6/2019, presented to OSH on 10/14/24 with hemoptysis and URI for several days. Diagnosed with COVID, but in the course of workup CTPE showed 9/1 cm dilatation of distal aortic arch; patient transferred to [facility] urgently due to c/f impending rupture.     Procedures:  10/14/24: TEVAR distal to subclavian artery (cTAG 40x200 mm), with distal extension graft (cTAG 45x150 mm)         Interval: CPAP at night, continues to do well, denies new back or chest pain.  SBP appropriately controlled on home regimen, left lung with improved aeration however continues to have significant opacification on chest x-ray. Patient does not have or use CPAP at home.    Chest x-ray AP and lateral with slightly improved aeration in left lung, continues to have atelectasis, mid and lower lung opacification which could be atelectasis with pleural effusion.    Physical Exam:  /62 (BP Location: Right arm)   Pulse 75   Temp 97.9  F (36.6  C) (Oral)   Resp 20   Ht 1.753 m (5' 9\")   Wt 107.4 kg (236 lb 12.4 oz)   SpO2 98%   BMI 34.97 kg/m     Gen: NAD, AAOx3  CV: RRR by peripheral pulse  Resp: NLB on RA  Abd: Soft, NTND, non-peritonitic  Groins: soft bilaterally. No hematoma.  Ext: 2+ DP/PT bl. Non-palpable L radial pulse, but CR<2 and hands WWP; no hematoma.   Neuro: SILT and 5/5 strength in all extremities.     Pertinent Labs:    Recent Labs   Lab 10/18/24  0455 10/17/24  0504 10/16/24  0355   WBC 9.2 8.6 8.9   HGB 9.7* 10.1* 10.5*   HCT 30.3* 31.4* 32.1*    192 173     Recent Labs   Lab 10/18/24  0455 10/17/24  0504 10/16/24  0355    137 139   CO2 26 25 27   BUN 12.2 10.6 10.3   PHOS 3.2 2.8 2.7     Recent Labs   Lab 10/18/24  0455 10/17/24  0504 10/16/24  0355   AST 15 18 16   ALT 15 12 15   ALKPHOS 65 68 70 "   ALBUMIN 3.2* 3.2* 3.3*     Assessment/Plan: 54 year old AAM with h/o total arch with traditional elephant trunk 2019, now with contained rupture at the descending thoracic aorta, 4 Days Post-Op s/p TEVAR for coverage with Dr. Bonner and Dr. Medina 10/14. Doing well clinically, tolerating diet appropriately, the presence of a bronco aorta fistula is highly unlikely.     - Resumed home amlodipine, metoprolol.  - Regular diet  - Neurovascular checks per unit protocol- must be able to flex at the hips bilaterally, assess sensory and motor function.  - MAP>70 and SBP<160 mmHg   - ASA, statin  - Standing weight 107.4 kg yesterday, pending new weight today, was 106.5 kg on admission  - Pulmonary consult for tracheal deviation and left lung opacification as we plan for discharge. Of note patient does not have/use CPAP at home.   - Chest physical therapy for left lung opacification.  - DVT ppx: SQH  - Encourage ambulation in the room given COVID precautions    Disposition: Pending discharge potentially later today or tomorrow, depending on pulmonary consult recommendations    Staff: Dr. Ha Heller, CNP  Vascular Surgery  Pager: [phone]  [email or web address]  Send message or 10 digit call back number Securely via [employer] with the Vocera Web Console (learn more here)

## 2024-10-21 ENCOUNTER — PATIENT OUTREACH (OUTPATIENT)
Dept: CARE COORDINATION | Facility: CLINIC | Age: 54
End: 2024-10-21
Payer: COMMERCIAL

## 2024-10-21 ENCOUNTER — TELEPHONE (OUTPATIENT)
Dept: VASCULAR SURGERY | Facility: CLINIC | Age: 54
End: 2024-10-21
Payer: COMMERCIAL

## 2024-10-21 NOTE — TELEPHONE ENCOUNTER
Called pt to follow-up s/p hospital discharge after aortic repair. Attempted to reach him on both home and mobile numbers but was unable to reach him - LVM on his mobile number. Will send MyC message and make another attempt at a later time.    TRISTON LyonsN, RN  RN Care Coordinator  [facility] Vascular Surgery - [facility] phone: [phone]  Fax: [phone]

## 2024-10-21 NOTE — TELEPHONE ENCOUNTER
RECORDS STATUS - ALL OTHER DIAGNOSIS      RECORDS RECEIVED FROM: [facility]   NOTES STATUS DETAILS   OFFICE NOTE from referring provider Epic Dr. Rosalba Chatterjee via admission on 10/14/2024   DISCHARGE SUMMARY from hospital [facility] 10/14/2024, 12/31/2019, 6/17/2019 - [facility]    6/10/2019, 5/2/2019 - [facility]    DISCHARGE REPORT from the ER [facility] 10/14/2024 - [facility] ED   OPERATIVE REPORT [facility] 10/14/2024 - ULTRASOUND GUIDED VASCULAR ACCESS X2, THORACIC STENT GRAFT THORACIC STENT GRAFT DISTAL TO LEFT SUBCLAVIAN ARTERY, INTRAVASCULAR ULTRASOUND OF AORTIC ARCH, DESCENDING THORACIC AORTA, LARGE BORE ACCESS CLOSURE X1    6/10/2019 - ASCENDING AORTA AND AORTIC ARCH ANEURYSM REPAIR      5/2/2019 - Coronary Angiogram    MEDICATION LIST [facility]    LABS     PATHOLOGY REPORTS [facility] 6/10/2019 - [id number]    ANYTHING RELATED TO DIAGNOSIS Epic 10/18/2024   IMAGING (NEED IMAGES & REPORT)     CT SCANS PACS CTA CAP: 10/18/2024-1/22/2020  CT Chest: 10/14/2024, 6/16/2019   MRI     XRAYS PACs Xray Chest: 10/18/2024-6/6/2019   ULTRASOUND     PET     IMAGE DISC [employer] TRACKING   TRACKING #:

## 2024-10-21 NOTE — LETTER
October 23, 2024      Yadiel Roth  [address] [place] [address]        To Whom It May Concern,         Mr Roth is under my care s/p thoracic aortic repair which was done on 10/14/24. Patient may return to work as of 10/21 with the following restrictions:    - Must work from home  - Must remain on light duty/desk duty   - No lifting >10lb    Please contact our office with questions at [phone].        Sincerely,      Dr. Jose Elias Medina  [facility] Vascular Surgery  P: [phone]  F: [phone]

## 2024-10-22 NOTE — TELEPHONE ENCOUNTER
[facility] Call Center    Phone Message    May a detailed message be left on voicemail: yes     Reason for Call: Form or Letter   Type or form/letter needing completion: return to work for primary job    *note stating that pt had surgery can come to work 10/21 no restriction for talking on the phone*    Provider:unknown  Date form needed: ASAP  Once completed: pt is asking if the letter can be emailed to him [email or web address]? Pt had tried reaching out to company for fax line still waiting on confirmation of that #     Action Taken: Message routed to:  [facility]/IR    Travel Screening: Not Applicable     Date of Service:

## 2024-10-22 NOTE — TELEPHONE ENCOUNTER
"Called and spoke with Yadiel. He feels that he is doing well at this time - states he feels \"fine\". His voice is \"fine\". He states he is still coughing up blood, but states it is a very small amount. He is still coughing but much less. He states he still does feel some congestion, but feels this has improved. Denies shortness of breath. He is able to tolerate basic activity including going up and down stairs. Denies pain.    He is eager to return to work and would like a letter stating he can return to work this week. 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 I let him know I will discuss this with Dr. Medina and will get back to him.    HENRY Lyons, RN  RN Care Coordinator  [facility] Vascular Surgery - [facility] phone: [phone]  Fax: [phone]    "

## 2024-10-23 DIAGNOSIS — U07.1 INFECTION DUE TO 2019 NOVEL CORONAVIRUS: ICD-10-CM

## 2024-10-23 NOTE — TELEPHONE ENCOUNTER
Discussed with MD - recommend pt remains off work for 2-3 weeks for recover. 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 He states he is not physically going into work at this time. He is anxious about paying his bills.    Will readdress with MD.    HENRY Lyons, RN  RN Care Coordinator  [facility] Vascular Surgery - [facility] phone: [phone]  Fax: [phone]

## 2024-10-23 NOTE — TELEPHONE ENCOUNTER
Discussed with MD Israel PAYAN for pt to [facility] on light duty as tolerated per his request. Called pt w/ update. Will send letter to him for his employer to fax: [phone], Attn: Briseida Hernandez.    Letter to state: OK to return to work from home on light duty starting 10/21.    HENRY Lyons, RN  RN Care Coordinator  [facility] Vascular Surgery - [facility] phone: [phone]  Fax: [phone]       63.5

## 2024-10-24 ENCOUNTER — TELEPHONE (OUTPATIENT)
Dept: VASCULAR SURGERY | Facility: CLINIC | Age: 54
End: 2024-10-24
Payer: COMMERCIAL

## 2024-10-24 NOTE — TELEPHONE ENCOUNTER
Left Voicemail (1st Attempt) and Sent Mychart (1st Attempt) for the patient to call back and schedule the following:Post op ruptured AAA, now status post TEVAR.     Location: [facility] Vascular  Provider: Christie Heller CNP and Dr. Jose Elias Medina  Appointment type: Return Vascular Patient  Appointment mode: In Person  Return date: 2 weeks with Christie Heller and 4 weeks with  CT Scan prior to  appointment.    Specialty phone number: [phone] or [phone]      Is Imaging Needed: Yes, CTA    Additional Notes: Please schedule imaging prior to Provider visit.    -Candelario Laughlin on 10/24/2024 at 11:18 AM

## 2024-10-24 NOTE — TELEPHONE ENCOUNTER
----- Message from Lucinda JUSTIN sent at 10/24/2024  8:04 AM CDT -----  Donn Palomino,    I apologize, I know you are swamped. This pt's post-ops still haven't been set up. Can we prioritize him please? Thank you    Lucinda  ----- Message -----  From: Christie Heller APRN CNP  Sent: 10/18/2024  12:52 PM CDT  To: Lucinda Nunez RN; #    Hi team,  Can you please schedule this patient to follow-up with me in 2 weeks and Dr. Medina in 4 weeks with repeat CTA chest abdomen pelvis. Patient had ruptured AAA, now status post TEVAR.  Thank you  Christie

## 2024-10-27 RX ORDER — METHOCARBAMOL 750 MG/1
TABLET, FILM COATED ORAL
COMMUNITY
Start: 2024-10-18

## 2024-10-28 NOTE — TELEPHONE ENCOUNTER
The pt is scheduled on 11/11 with Pina at the [facility].  The pt is scheduled on 11/25 for imaging and with   Dates and times are per the pts choice.  The pt was on the phone during scheduling of the above appointments and agreed to the dates times and locations of the appointments.  Candelario Laughlin on 10/28/2024 at 12:03 PM

## 2024-10-28 NOTE — PROGRESS NOTES
Pre-visit planning and chart review completed.     NEW patient appointment:    10/30 with Dr. Ian Ornelas  10/18 CTA CAP    - No anticoagulation.   - Never smoker.    CE updated. Medications, allergies, problem list, and immunizations reconciled.

## 2024-10-30 ENCOUNTER — PRE VISIT (OUTPATIENT)
Dept: PULMONOLOGY | Facility: CLINIC | Age: 54
End: 2024-10-30
Payer: COMMERCIAL

## 2024-10-30 ENCOUNTER — ONCOLOGY VISIT (OUTPATIENT)
Dept: PULMONOLOGY | Facility: CLINIC | Age: 54
End: 2024-10-30
Attending: STUDENT IN AN ORGANIZED HEALTH CARE EDUCATION/TRAINING PROGRAM
Payer: COMMERCIAL

## 2024-10-30 VITALS
DIASTOLIC BLOOD PRESSURE: 74 MMHG | SYSTOLIC BLOOD PRESSURE: 112 MMHG | RESPIRATION RATE: 19 BRPM | TEMPERATURE: 97.3 F | HEART RATE: 94 BPM | BODY MASS INDEX: 35.19 KG/M2 | WEIGHT: 238.3 LBS | OXYGEN SATURATION: 99 %

## 2024-10-30 DIAGNOSIS — I71.23 ANEURYSM OF DESCENDING THORACIC AORTA WITHOUT RUPTURE (H): ICD-10-CM

## 2024-10-30 PROCEDURE — 99213 OFFICE O/P EST LOW 20 MIN: CPT | Performed by: INTERNAL MEDICINE

## 2024-10-30 PROCEDURE — 99204 OFFICE O/P NEW MOD 45 MIN: CPT | Performed by: INTERNAL MEDICINE

## 2024-10-30 ASSESSMENT — PAIN SCALES - GENERAL: PAINLEVEL_OUTOF10: NO PAIN (0)
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
## 2024-10-30 NOTE — NURSING NOTE
"Oncology Rooming Note    October 30, 2024 10:03 AM   Yadiel Roth is a 54 year old male who presents for:    Chief Complaint   Patient presents with    Oncology Clinic Visit     Aneurysm of descending thoracic aorta without rupture     Initial Vitals: /74 (BP Location: Right arm, Patient Position: Sitting, Cuff Size: Adult Regular)   Pulse 94   Temp 97.3  F (36.3  C) (Oral)   Resp 19   Wt 108.1 kg (238 lb 4.8 oz)   SpO2 99%   BMI 35.19 kg/m   Estimated body mass index is 35.19 kg/m  as calculated from the following:    Height as of 10/14/24: 1.753 m (5' 9\").    Weight as of this encounter: 108.1 kg (238 lb 4.8 oz). Body surface area is 2.29 meters squared.  No Pain (0) Comment: Data Unavailable   No LMP for male patient.  Allergies reviewed: Yes  Medications reviewed: Yes    Medications: Medication refills not needed today.  Pharmacy name entered into Rapportive:    [facility] - [place], [place] - [address] [facility] [address] [place], [place] - [address] [place] [address]    Frailty Screening:   Is the patient here for a new oncology consult visit in cancer care? 2. No      Clinical concerns: Pt reports coughing a little bit of phlegm, but denies any blood in it. Dr. Ornelas was notified via message.       Hortensia Seymour, EMT     "

## 2024-11-11 ENCOUNTER — OFFICE VISIT (OUTPATIENT)
Dept: VASCULAR SURGERY | Facility: CLINIC | Age: 54
End: 2024-11-11
Payer: COMMERCIAL

## 2024-11-11 VITALS
OXYGEN SATURATION: 98 % | BODY MASS INDEX: 35.1 KG/M2 | DIASTOLIC BLOOD PRESSURE: 68 MMHG | HEIGHT: 69 IN | SYSTOLIC BLOOD PRESSURE: 124 MMHG | WEIGHT: 237 LBS | HEART RATE: 81 BPM

## 2024-11-11 DIAGNOSIS — Z98.890 POSTOPERATIVE STATE: Primary | ICD-10-CM

## 2024-11-11 DIAGNOSIS — Z51.89 VISIT FOR WOUND CHECK: ICD-10-CM

## 2024-11-11 PROCEDURE — 99024 POSTOP FOLLOW-UP VISIT: CPT | Performed by: NURSE PRACTITIONER

## 2024-11-11 ASSESSMENT — PAIN SCALES - GENERAL: PAINLEVEL_OUTOF10: NO PAIN (0)

## 2024-11-11 NOTE — NURSING NOTE
"Chief Complaint   Patient presents with    Follow Up     ruptured AAA     /68   Pulse 81   Ht 5' 9\"   Wt 237 lb   SpO2 98%   BMI 35.00 kg/m    Nkechi Whitaker MA on 11/11/2024 at 9:37 AM    "
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
## 2024-11-14 NOTE — PATIENT INSTRUCTIONS
Thank you so much for choosing us for your care. It was a pleasure to see you at the vascular clinic today.     Recommendations:   -Continue aspirin 81 mg and statin daily  -Monitor for weakness, new back/abdominal pain, or any other concerns that might arise  -Slowly increase walking activities as tolerated, without exertion  -Keep the appointment with vascular surgeon on 11/25/2024  -Follow up with me if symptoms worsen or fail to improve      If you have any questions, please contact our clinic directly at [phone] and ask for the nurse. We also encourage the use of [employer] to communicate with your HealthCare Provider.        If you have an urgent need after business hours (8:00 am to 4:30 pm) please call [phone], option 4, and ask for the vascular attending on call. For non-urgent after hours needs, please call the vascular nurse at [phone] and leave a detailed voicemail. For scheduling needs, please call our clinic directly at [phone].

## 2024-11-25 ENCOUNTER — ANCILLARY PROCEDURE (OUTPATIENT)
Dept: CT IMAGING | Facility: CLINIC | Age: 54
End: 2024-11-25
Attending: SURGERY
Payer: COMMERCIAL

## 2024-11-25 ENCOUNTER — OFFICE VISIT (OUTPATIENT)
Dept: VASCULAR SURGERY | Facility: CLINIC | Age: 54
End: 2024-11-25
Payer: COMMERCIAL

## 2024-11-25 VITALS — HEART RATE: 86 BPM | SYSTOLIC BLOOD PRESSURE: 90 MMHG | DIASTOLIC BLOOD PRESSURE: 67 MMHG | OXYGEN SATURATION: 96 %

## 2024-11-25 DIAGNOSIS — Z98.890 HISTORY OF REPAIR OF THORACIC AORTIC ANEURYSM: Primary | ICD-10-CM

## 2024-11-25 DIAGNOSIS — Z86.79 HISTORY OF REPAIR OF THORACIC AORTIC ANEURYSM: Primary | ICD-10-CM

## 2024-11-25 DIAGNOSIS — Z86.79 S/P AORTIC ANEURYSM REPAIR: ICD-10-CM

## 2024-11-25 DIAGNOSIS — Z98.890 S/P AORTIC ANEURYSM REPAIR: ICD-10-CM

## 2024-11-25 DIAGNOSIS — I71.23 ANEURYSM OF DESCENDING THORACIC AORTA WITHOUT RUPTURE (H): ICD-10-CM

## 2024-11-25 DIAGNOSIS — I77.812: ICD-10-CM

## 2024-11-25 DIAGNOSIS — Z98.890 POSTOPERATIVE STATE: ICD-10-CM

## 2024-11-25 DIAGNOSIS — I10 ESSENTIAL HYPERTENSION: ICD-10-CM

## 2024-11-25 DIAGNOSIS — I71.21 ANEURYSM OF ASCENDING AORTA WITHOUT RUPTURE (H): ICD-10-CM

## 2024-11-25 DIAGNOSIS — E78.2 MIXED HYPERLIPIDEMIA: ICD-10-CM

## 2024-11-25 DIAGNOSIS — E11.69 TYPE 2 DIABETES MELLITUS WITH OTHER SPECIFIED COMPLICATION, WITHOUT LONG-TERM CURRENT USE OF INSULIN (H): ICD-10-CM

## 2024-11-25 LAB
CREAT BLD-MCNC: 1.3 MG/DL (ref 0.7–1.3)
EGFRCR SERPLBLD CKD-EPI 2021: >60 ML/MIN/1.73M2

## 2024-11-25 PROCEDURE — 74174 CTA ABD&PLVS W/CONTRAST: CPT | Mod: GC | Performed by: RADIOLOGY

## 2024-11-25 PROCEDURE — 71275 CT ANGIOGRAPHY CHEST: CPT | Mod: GC | Performed by: RADIOLOGY

## 2024-11-25 PROCEDURE — 82565 ASSAY OF CREATININE: CPT | Performed by: PATHOLOGY

## 2024-11-25 RX ORDER — IOPAMIDOL 755 MG/ML
125 INJECTION, SOLUTION INTRAVASCULAR ONCE
Status: COMPLETED | OUTPATIENT
Start: 2024-11-25 | End: 2024-11-25

## 2024-11-25 RX ADMIN — IOPAMIDOL 125 ML: 755 INJECTION, SOLUTION INTRAVASCULAR at 08:00

## 2024-11-25 NOTE — DISCHARGE INSTRUCTIONS
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
## 2024-11-25 NOTE — NURSING NOTE
"Chief Complaint   Patient presents with    Follow Up     Ruptured AAA s/p TEVAR w/ CTA prior       Initial BP 90/67 (BP Location: Left arm, Patient Position: Sitting, Cuff Size: Adult Large)   Pulse 86   SpO2 96%  Estimated body mass index is 35 kg/m  as calculated from the following:    Height as of 11/11/24: 1.753 m (5' 9\").    Weight as of 11/11/24: 107.5 kg (237 lb).  BP completed using cuff size: large    Gillian Lal, EMT  "

## 2024-11-25 NOTE — PATIENT INSTRUCTIONS
Thank you so much for choosing us for your care. It was a pleasure to see you at the vascular clinic today.     Follow-up recommendations: We will see you back in 5 months. Please do not hesitate to reach out to us in the meantime with any concerns. Our schedulers will get in touch with you to set up your follow-up visit.    Additional testing/imaging ordered today: CTA chest/abdomen/pelvis in 5 months. Referral to genetics.      Our scheduling team will get in touch with you to set up any follow-up testing/imaging and/or appointments. Please be aware that any testing/imaging recommended today will need to completed prior to your next visit with the provider. If testing/imaging is not completed prior to your next visit, your visit may be rescheduled.     If you have any questions, please contact our clinic directly at [phone] and ask for the nurse. We also encourage the use of [employer] to communicate with your healthcare provider.    If you have an urgent need after business hours (8:00 am to 4:30 pm) please call [phone], option 4, and ask for the vascular attending on call. For non-urgent after hours needs, please call the vascular clinic at [phone]. For scheduling needs, please call our clinic directly at [phone].

## 2024-11-30 ENCOUNTER — HEALTH MAINTENANCE LETTER (OUTPATIENT)
Age: 54
End: 2024-11-30

## 2025-02-04 ENCOUNTER — TELEPHONE (OUTPATIENT)
Dept: CARDIOLOGY | Facility: CLINIC | Age: 55
End: 2025-02-04
Payer: COMMERCIAL

## 2025-02-04 NOTE — TELEPHONE ENCOUNTER
LM on mobile # re: referral for GC due to history of aortic dissection.  Provided our contact information for scheduling.      Lesley Mcnamara MS, [facility]  Licensed, Certified Genetic Counselor  [facility]  [facility]

## 2025-02-13 ENCOUNTER — TELEPHONE (OUTPATIENT)
Dept: CARDIOLOGY | Facility: CLINIC | Age: 55
End: 2025-02-13
Payer: COMMERCIAL

## 2025-02-13 NOTE — TELEPHONE ENCOUNTER
"Attempted to reach patient to schedule genetic counseling/testing. No answer, unable to leave voicemail.  There is an automated recording that says \"this mailbox is unavailable\", and the line hangs up.  "

## 2025-02-27 ENCOUNTER — TELEPHONE (OUTPATIENT)
Dept: VASCULAR SURGERY | Facility: CLINIC | Age: 55
End: 2025-02-27
Payer: COMMERCIAL

## 2025-02-27 NOTE — TELEPHONE ENCOUNTER
Unable to LVM Sent Mychart (1st Attempt) for the patient to call back and schedule the followin [facility] follow Patient had ruptured AAA, now status post TEVAR     Location: [facility]  Provider: Dr. Jose Elias Medina  Appointment type: Return Vascular Patient  Appointment mode: In Person  Return date: May 2025    Specialty phone number: [phone] or [phone]    Referred to [facility]: No    Is Imaging Needed: Yes, CTA    Additional Notes: Please schedule imaging prior to Provider visit.  -Candelario Laughlin on 2025 at 5:49 PM

## 2025-04-14 ENCOUNTER — PATIENT OUTREACH (OUTPATIENT)
Dept: CARE COORDINATION | Facility: CLINIC | Age: 55
End: 2025-04-14
Payer: COMMERCIAL

## 2025-04-26 ENCOUNTER — HEALTH MAINTENANCE LETTER (OUTPATIENT)
Age: 55
End: 2025-04-26

## (undated) DEVICE — DRSG TEGADERM 4X4 3/4" 1626W

## (undated) DEVICE — Device

## (undated) DEVICE — CLOSURE DEVICE 6FR VASC PROGLIDE MEDICATED SUTURE 12673-03

## (undated) DEVICE — SOL NACL 0.9% IRRIG 1000ML BOTTLE 2F7124

## (undated) DEVICE — PREP CHLORAPREP 26ML TINTED HI-LITE ORANGE 930815

## (undated) DEVICE — SOL WATER IRRIG 1000ML BOTTLE 2F7114

## (undated) DEVICE — ESU GROUND PAD ADULT REM W/15' CORD E7507DB

## (undated) DEVICE — GLIDEWIRE TERUMO .035X180CM 1.5,, J-TIP GR3525

## (undated) DEVICE — DRAPE IOBAN INCISE 23X17" 6650EZ

## (undated) DEVICE — CATH ANGIO MARINER KUMPE 5FRX100CM 11732703

## (undated) DEVICE — LINEN TOWEL PACK X30 5481

## (undated) DEVICE — CATH OMNIFLUSH 5FRX100CM SIZING 13709703

## (undated) DEVICE — DEVICE MULTI TORQUE TD01

## (undated) DEVICE — GLOVE BIOGEL PI MICRO INDICATOR UNDERGLOVE SZ 7.5 48975

## (undated) DEVICE — INFLATION DEVICE ENCORE  26 PRESSURE GAUGE M001151050

## (undated) DEVICE — SHEATH INTRODUCER DRYSEAL FLEX W/HYDRO CT 24FRX33CM DSF2433

## (undated) DEVICE — GUIDEWIRE TERUMO .035X260CM EXCHANGE ANG GS3509

## (undated) DEVICE — INTRO SHEATH BRITE TIP 6FRX11CM 401-611M

## (undated) DEVICE — COVER CAMERA VIDEO LASER 9X96" 04-CC227

## (undated) DEVICE — CATH TRAY FOLEY 16FR BARDEX W/TEMP PRB URINE METER 319416AM

## (undated) DEVICE — LINEN TOWEL PACK X6 WHITE 5487

## (undated) DEVICE — TUBING MEDRAD 48" HIGH PRESSURE MX694

## (undated) DEVICE — SOL NACL 0.9% INJ 1000ML BAG 2B1324X

## (undated) DEVICE — SU DERMABOND ADVANCED .7ML DNX12

## (undated) DEVICE — COVER ULTRASOUND PROBE W/GEL FLEXI-FEEL 6"X58" LF  25-FF658

## (undated) DEVICE — WIRE GUIDE LUNDERQUIST 0.035"X260CM DBL CVD

## (undated) DEVICE — DRAPE C-ARM W/STRAPS 42X72" 07-CA104

## (undated) DEVICE — CATH IMAGING ULTRASOUND VISIONS PV.035 7FRX90CM 88901

## (undated) DEVICE — SYR MEDRAD 150ML MARK 7 ARTERION ART700SYR

## (undated) DEVICE — INTRO SHEATH 9FRX10CM PINNACLE RSS902

## (undated) DEVICE — GLOVE BIOGEL PI MICRO SZ 7.0 48570

## (undated) RX ORDER — FENTANYL CITRATE-0.9 % NACL/PF 10 MCG/ML
PLASTIC BAG, INJECTION (ML) INTRAVENOUS
Status: DISPENSED
Start: 2024-10-14

## (undated) RX ORDER — BUPIVACAINE HYDROCHLORIDE 2.5 MG/ML
INJECTION, SOLUTION EPIDURAL; INFILTRATION; INTRACAUDAL
Status: DISPENSED
Start: 2024-10-14

## (undated) RX ORDER — VERAPAMIL HYDROCHLORIDE 2.5 MG/ML
INJECTION, SOLUTION INTRAVENOUS
Status: DISPENSED
Start: 2024-10-14

## (undated) RX ORDER — IODIXANOL 320 MG/ML
INJECTION, SOLUTION INTRAVASCULAR
Status: DISPENSED
Start: 2024-10-14

## (undated) RX ORDER — FENTANYL CITRATE 50 UG/ML
INJECTION, SOLUTION INTRAMUSCULAR; INTRAVENOUS
Status: DISPENSED
Start: 2024-10-14

## (undated) RX ORDER — HEPARIN SODIUM 1000 [USP'U]/ML
INJECTION, SOLUTION INTRAVENOUS; SUBCUTANEOUS
Status: DISPENSED
Start: 2024-10-14